# Patient Record
Sex: FEMALE | Race: WHITE | NOT HISPANIC OR LATINO
[De-identification: names, ages, dates, MRNs, and addresses within clinical notes are randomized per-mention and may not be internally consistent; named-entity substitution may affect disease eponyms.]

---

## 2017-01-20 ENCOUNTER — MESSAGE (OUTPATIENT)
Age: 82
End: 2017-01-20

## 2017-02-23 ENCOUNTER — APPOINTMENT (OUTPATIENT)
Dept: PULMONOLOGY | Facility: CLINIC | Age: 82
End: 2017-02-23

## 2017-02-23 VITALS
OXYGEN SATURATION: 97 % | DIASTOLIC BLOOD PRESSURE: 60 MMHG | HEART RATE: 63 BPM | BODY MASS INDEX: 36.95 KG/M2 | SYSTOLIC BLOOD PRESSURE: 118 MMHG | WEIGHT: 243 LBS

## 2017-03-13 ENCOUNTER — EMERGENCY (EMERGENCY)
Facility: HOSPITAL | Age: 82
LOS: 1 days | Discharge: HOME | End: 2017-03-13
Attending: EMERGENCY MEDICINE
Payer: MEDICARE

## 2017-03-13 VITALS
TEMPERATURE: 98 F | HEIGHT: 67 IN | SYSTOLIC BLOOD PRESSURE: 119 MMHG | WEIGHT: 240.08 LBS | HEART RATE: 60 BPM | RESPIRATION RATE: 18 BRPM | OXYGEN SATURATION: 99 % | DIASTOLIC BLOOD PRESSURE: 78 MMHG

## 2017-03-13 VITALS
RESPIRATION RATE: 18 BRPM | HEART RATE: 63 BPM | SYSTOLIC BLOOD PRESSURE: 126 MMHG | OXYGEN SATURATION: 96 % | DIASTOLIC BLOOD PRESSURE: 68 MMHG

## 2017-03-13 DIAGNOSIS — Z90.710 ACQUIRED ABSENCE OF BOTH CERVIX AND UTERUS: Chronic | ICD-10-CM

## 2017-03-13 DIAGNOSIS — Z79.01 LONG TERM (CURRENT) USE OF ANTICOAGULANTS: ICD-10-CM

## 2017-03-13 DIAGNOSIS — G56.00 CARPAL TUNNEL SYNDROME, UNSPECIFIED UPPER LIMB: Chronic | ICD-10-CM

## 2017-03-13 DIAGNOSIS — R55 SYNCOPE AND COLLAPSE: ICD-10-CM

## 2017-03-13 DIAGNOSIS — Z98.89 OTHER SPECIFIED POSTPROCEDURAL STATES: Chronic | ICD-10-CM

## 2017-03-13 DIAGNOSIS — Z95.0 PRESENCE OF CARDIAC PACEMAKER: ICD-10-CM

## 2017-03-13 DIAGNOSIS — Z90.49 ACQUIRED ABSENCE OF OTHER SPECIFIED PARTS OF DIGESTIVE TRACT: Chronic | ICD-10-CM

## 2017-03-13 DIAGNOSIS — Z96.651 PRESENCE OF RIGHT ARTIFICIAL KNEE JOINT: Chronic | ICD-10-CM

## 2017-03-13 DIAGNOSIS — K21.9 GASTRO-ESOPHAGEAL REFLUX DISEASE WITHOUT ESOPHAGITIS: ICD-10-CM

## 2017-03-13 DIAGNOSIS — Z98.42 CATARACT EXTRACTION STATUS, LEFT EYE: Chronic | ICD-10-CM

## 2017-03-13 DIAGNOSIS — I11.0 HYPERTENSIVE HEART DISEASE WITH HEART FAILURE: ICD-10-CM

## 2017-03-13 DIAGNOSIS — Z86.718 PERSONAL HISTORY OF OTHER VENOUS THROMBOSIS AND EMBOLISM: ICD-10-CM

## 2017-03-13 DIAGNOSIS — D12.6 BENIGN NEOPLASM OF COLON, UNSPECIFIED: Chronic | ICD-10-CM

## 2017-03-13 DIAGNOSIS — I48.91 UNSPECIFIED ATRIAL FIBRILLATION: ICD-10-CM

## 2017-03-13 DIAGNOSIS — H02.409 UNSPECIFIED PTOSIS OF UNSPECIFIED EYELID: Chronic | ICD-10-CM

## 2017-03-13 DIAGNOSIS — Z98.41 CATARACT EXTRACTION STATUS, RIGHT EYE: Chronic | ICD-10-CM

## 2017-03-13 DIAGNOSIS — I50.9 HEART FAILURE, UNSPECIFIED: ICD-10-CM

## 2017-03-13 DIAGNOSIS — Z90.710 ACQUIRED ABSENCE OF BOTH CERVIX AND UTERUS: ICD-10-CM

## 2017-03-13 DIAGNOSIS — J44.0 CHRONIC OBSTRUCTIVE PULMONARY DISEASE WITH ACUTE LOWER RESPIRATORY INFECTION: ICD-10-CM

## 2017-03-13 DIAGNOSIS — M75.121 COMPLETE ROTATOR CUFF TEAR OR RUPTURE OF RIGHT SHOULDER, NOT SPECIFIED AS TRAUMATIC: Chronic | ICD-10-CM

## 2017-03-13 DIAGNOSIS — Z98.42 CATARACT EXTRACTION STATUS, LEFT EYE: ICD-10-CM

## 2017-03-13 DIAGNOSIS — Z98.41 CATARACT EXTRACTION STATUS, RIGHT EYE: ICD-10-CM

## 2017-03-13 LAB
APTT BLD: 42.6 SEC — HIGH (ref 27.5–37.4)
HCT VFR BLD CALC: 45.2 % — SIGNIFICANT CHANGE UP (ref 37–47)
HGB BLD-MCNC: 14.5 G/DL — SIGNIFICANT CHANGE UP (ref 12–16)
INR BLD: 2.77 RATIO — HIGH (ref 0.88–1.16)
LIDOCAIN IGE QN: 30 U/L — SIGNIFICANT CHANGE UP (ref 22–51)
MCHC RBC-ENTMCNC: 32.1 G/DL — SIGNIFICANT CHANGE UP (ref 32–36)
MCHC RBC-ENTMCNC: 34.2 PG — HIGH (ref 27–31)
MCV RBC AUTO: 106.6 FL — HIGH (ref 81–99)
NT-PROBNP SERPL-SCNC: 1083 PG/ML — HIGH (ref 0–300)
PLATELET # BLD AUTO: 174 K/UL — SIGNIFICANT CHANGE UP (ref 150–400)
PROTHROM AB SERPL-ACNC: 30.8 SEC — HIGH (ref 10–13.1)
RAPID RVP RESULT: SIGNIFICANT CHANGE UP
RBC # BLD: 4.24 M/UL — LOW (ref 4.4–5.2)
RBC # FLD: 15.2 % — SIGNIFICANT CHANGE UP (ref 11–15.6)
TROPONIN T SERPL-MCNC: <0.01 NG/ML — SIGNIFICANT CHANGE UP (ref 0–0.06)
WBC # BLD: 4.4 K/UL — LOW (ref 4.8–10.8)
WBC # FLD AUTO: 4.4 K/UL — LOW (ref 4.8–10.8)

## 2017-03-13 PROCEDURE — 71020: CPT | Mod: 26

## 2017-03-13 PROCEDURE — 99285 EMERGENCY DEPT VISIT HI MDM: CPT

## 2017-03-13 PROCEDURE — 70450 CT HEAD/BRAIN W/O DYE: CPT | Mod: 26

## 2017-03-13 PROCEDURE — 93010 ELECTROCARDIOGRAM REPORT: CPT

## 2017-03-13 PROCEDURE — 71275 CT ANGIOGRAPHY CHEST: CPT | Mod: 26

## 2017-03-13 RX ORDER — DEXAMETHASONE 0.5 MG/5ML
10 ELIXIR ORAL ONCE
Qty: 0 | Refills: 0 | Status: COMPLETED | OUTPATIENT
Start: 2017-03-13 | End: 2017-03-13

## 2017-03-13 RX ORDER — SODIUM CHLORIDE 9 MG/ML
3 INJECTION INTRAMUSCULAR; INTRAVENOUS; SUBCUTANEOUS ONCE
Qty: 0 | Refills: 0 | Status: COMPLETED | OUTPATIENT
Start: 2017-03-13 | End: 2017-03-13

## 2017-03-13 RX ORDER — AZITHROMYCIN 500 MG/1
500 TABLET, FILM COATED ORAL ONCE
Qty: 0 | Refills: 0 | Status: COMPLETED | OUTPATIENT
Start: 2017-03-13 | End: 2017-03-13

## 2017-03-13 RX ORDER — SODIUM CHLORIDE 9 MG/ML
1000 INJECTION INTRAMUSCULAR; INTRAVENOUS; SUBCUTANEOUS ONCE
Qty: 0 | Refills: 0 | Status: COMPLETED | OUTPATIENT
Start: 2017-03-13 | End: 2017-03-13

## 2017-03-13 RX ORDER — IPRATROPIUM/ALBUTEROL SULFATE 18-103MCG
3 AEROSOL WITH ADAPTER (GRAM) INHALATION ONCE
Qty: 0 | Refills: 0 | Status: COMPLETED | OUTPATIENT
Start: 2017-03-13 | End: 2017-03-13

## 2017-03-13 RX ORDER — AZITHROMYCIN 500 MG/1
1 TABLET, FILM COATED ORAL
Qty: 4 | Refills: 0 | OUTPATIENT
Start: 2017-03-13 | End: 2017-03-17

## 2017-03-13 RX ADMIN — SODIUM CHLORIDE 1000 MILLILITER(S): 9 INJECTION INTRAMUSCULAR; INTRAVENOUS; SUBCUTANEOUS at 14:53

## 2017-03-13 RX ADMIN — SODIUM CHLORIDE 3 MILLILITER(S): 9 INJECTION INTRAMUSCULAR; INTRAVENOUS; SUBCUTANEOUS at 12:05

## 2017-03-13 RX ADMIN — Medication 10 MILLIGRAM(S): at 17:19

## 2017-03-13 RX ADMIN — Medication 100 MILLIGRAM(S): at 17:19

## 2017-03-13 RX ADMIN — AZITHROMYCIN 500 MILLIGRAM(S): 500 TABLET, FILM COATED ORAL at 17:18

## 2017-03-13 RX ADMIN — Medication 3 MILLILITER(S): at 14:50

## 2017-03-13 NOTE — ED PROVIDER NOTE - PHYSICAL EXAMINATION
Constitutional: Alert, NAD.   ENT: Airway patent. Nose clear. Mouth with normal mucosa.   Head: Atraumatic.   Eyes: Clear bilaterally. PERRL.   Cardiac: Normal rate.   Respiratory: + rhonchi on the left  GI: Abdomen soft, non-tender, no guarding.   : No CVA or bladder tenderness.   Musculoskeletal: FROM, no muscle or joint tenderness or swelling.   Neuro: alert and oriented, no focal deficits, no motor or sensory deficits.   Skin: Dry, intact, no rash.   Psych: normal mood and affect.

## 2017-03-13 NOTE — ED PROVIDER NOTE - CARE PLAN
Principal Discharge DX:	Acute bronchitis  Secondary Diagnosis:	COPD (chronic obstructive pulmonary disease)  Secondary Diagnosis:	Near syncope

## 2017-03-13 NOTE — ED ADULT NURSE NOTE - PMH
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

## 2017-03-13 NOTE — ED PROVIDER NOTE - MEDICAL DECISION MAKING DETAILS
Patient is feeling better and wants to go home.  Will d/c with abx, steriods, tessalon perles, and oral rehydration instructions

## 2017-03-13 NOTE — ED ADULT TRIAGE NOTE - CHIEF COMPLAINT QUOTE
pt sent in from md office for eval of worsening dizziness and felt faint, pt was at md office for routine INR , recent hx of aspiration from unknown cause

## 2017-03-13 NOTE — ED PROVIDER NOTE - NS ED ROS FT
no fever  no chest pain  + SOB, + cough  no abd pain  no HA  + near syncope  All other ROS negative except as per HPI

## 2017-03-13 NOTE — ED ADULT NURSE NOTE - PSH
Acquired ptosis of eyelid  2014  Acute carpal tunnel syndrome  with surgery  Complete tear of rotator cuff, right  1998  H/O cardiac catheterization  2005 & 2007  H/O laminectomy  3/4 lumbar    2002  H/O total hysterectomy  1971  Rectal adenoma  s/p excision 2005  S/P cataract surgery, left  June 2010  S/P cataract surgery, right  May 2010  S/P cholecystectomy  1991  S/P IVC filter  placed 1/9  S/P knee replacement, right  2007

## 2017-03-13 NOTE — ED ADULT NURSE NOTE - OBJECTIVE STATEMENT
received pt AOx3, c/o dizziness and feeling faint since Monday. went to Dr. munson office today for blood work. Pt on coumadin. states her INR was 2.6, denies falling, states she walks with walker. Dr. Rolle recommended her come to ED for further evaluation. respirations even unlabored, MAEx4, neuro intact. will continue to monitor.

## 2017-03-13 NOTE — ED PROVIDER NOTE - OBJECTIVE STATEMENT
CC: cough and lightheadedness  Presenting symptoms: 84yo female c/o lightheadedness and near syncope and worse with coughing.  Patient with long coughing attacks where she becomes SOB and her O2 sat goes down into 60s and 70s.  Patient recently treated for aspiration pneumonia with steriods and abx 2 weeks ago, finished 1 week ago and symptoms worsened 2 days ago.  PAtient states only gets lightheaded with  standing and resolves when she lays down.  Pertinent Positives: cough, near syncope  Pertinent Negatives: no CP, no HA, no visual disturbance, no abd pain, no N/V/D, no rash  Timin days ago  Quality: nonproductive cough  Radiation: none  Severity:moderate  Aggravating Factors: coughing, standing  Relieving Factors: lying down

## 2017-03-23 ENCOUNTER — APPOINTMENT (OUTPATIENT)
Dept: PULMONOLOGY | Facility: CLINIC | Age: 82
End: 2017-03-23

## 2017-03-23 VITALS
DIASTOLIC BLOOD PRESSURE: 70 MMHG | RESPIRATION RATE: 14 BRPM | HEART RATE: 66 BPM | SYSTOLIC BLOOD PRESSURE: 108 MMHG | OXYGEN SATURATION: 98 %

## 2017-03-23 VITALS — WEIGHT: 235 LBS | BODY MASS INDEX: 35.73 KG/M2

## 2017-03-23 DIAGNOSIS — Z87.09 PERSONAL HISTORY OF OTHER DISEASES OF THE RESPIRATORY SYSTEM: ICD-10-CM

## 2017-03-23 RX ORDER — CHOLECALCIFEROL (VITAMIN D3) 50 MCG
50 MCG TABLET ORAL
Refills: 0 | Status: ACTIVE | COMMUNITY
Start: 2017-03-23

## 2017-04-13 PROCEDURE — 85730 THROMBOPLASTIN TIME PARTIAL: CPT

## 2017-04-13 PROCEDURE — 87486 CHLMYD PNEUM DNA AMP PROBE: CPT

## 2017-04-13 PROCEDURE — 93005 ELECTROCARDIOGRAM TRACING: CPT

## 2017-04-13 PROCEDURE — 94640 AIRWAY INHALATION TREATMENT: CPT

## 2017-04-13 PROCEDURE — 83880 ASSAY OF NATRIURETIC PEPTIDE: CPT

## 2017-04-13 PROCEDURE — 71046 X-RAY EXAM CHEST 2 VIEWS: CPT

## 2017-04-13 PROCEDURE — 87581 M.PNEUMON DNA AMP PROBE: CPT

## 2017-04-13 PROCEDURE — 70450 CT HEAD/BRAIN W/O DYE: CPT

## 2017-04-13 PROCEDURE — 87633 RESP VIRUS 12-25 TARGETS: CPT

## 2017-04-13 PROCEDURE — 85610 PROTHROMBIN TIME: CPT

## 2017-04-13 PROCEDURE — 85027 COMPLETE CBC AUTOMATED: CPT

## 2017-04-13 PROCEDURE — 99284 EMERGENCY DEPT VISIT MOD MDM: CPT | Mod: 25

## 2017-04-13 PROCEDURE — 96372 THER/PROPH/DIAG INJ SC/IM: CPT

## 2017-04-13 PROCEDURE — 87798 DETECT AGENT NOS DNA AMP: CPT

## 2017-04-13 PROCEDURE — 80053 COMPREHEN METABOLIC PANEL: CPT

## 2017-04-13 PROCEDURE — 82550 ASSAY OF CK (CPK): CPT

## 2017-04-13 PROCEDURE — 71275 CT ANGIOGRAPHY CHEST: CPT

## 2017-04-13 PROCEDURE — 83690 ASSAY OF LIPASE: CPT

## 2017-04-13 PROCEDURE — 84484 ASSAY OF TROPONIN QUANT: CPT

## 2017-04-20 ENCOUNTER — APPOINTMENT (OUTPATIENT)
Dept: PULMONOLOGY | Facility: CLINIC | Age: 82
End: 2017-04-20

## 2017-04-20 VITALS
DIASTOLIC BLOOD PRESSURE: 60 MMHG | SYSTOLIC BLOOD PRESSURE: 100 MMHG | OXYGEN SATURATION: 98 % | HEART RATE: 68 BPM | RESPIRATION RATE: 18 BRPM

## 2017-04-20 RX ORDER — CEFUROXIME AXETIL 500 MG/1
500 TABLET ORAL
Qty: 10 | Refills: 3 | Status: DISCONTINUED | COMMUNITY
Start: 2017-02-23 | End: 2017-04-20

## 2017-04-20 RX ORDER — PREDNISONE 10 MG/1
10 TABLET ORAL
Qty: 30 | Refills: 6 | Status: DISCONTINUED | COMMUNITY
Start: 2017-02-23 | End: 2017-04-20

## 2017-05-19 ENCOUNTER — APPOINTMENT (OUTPATIENT)
Dept: PULMONOLOGY | Facility: CLINIC | Age: 82
End: 2017-05-19

## 2017-05-19 VITALS
HEART RATE: 61 BPM | HEIGHT: 68 IN | BODY MASS INDEX: 35.61 KG/M2 | RESPIRATION RATE: 18 BRPM | SYSTOLIC BLOOD PRESSURE: 118 MMHG | TEMPERATURE: 98.2 F | WEIGHT: 235 LBS | OXYGEN SATURATION: 98 % | DIASTOLIC BLOOD PRESSURE: 78 MMHG

## 2017-05-19 DIAGNOSIS — Z87.891 PERSONAL HISTORY OF NICOTINE DEPENDENCE: ICD-10-CM

## 2017-06-02 ENCOUNTER — APPOINTMENT (OUTPATIENT)
Dept: PULMONOLOGY | Facility: CLINIC | Age: 82
End: 2017-06-02

## 2017-06-02 VITALS
TEMPERATURE: 98.4 F | SYSTOLIC BLOOD PRESSURE: 138 MMHG | HEIGHT: 68 IN | DIASTOLIC BLOOD PRESSURE: 80 MMHG | BODY MASS INDEX: 39.4 KG/M2 | OXYGEN SATURATION: 95 % | HEART RATE: 61 BPM | WEIGHT: 260 LBS | RESPIRATION RATE: 16 BRPM

## 2017-06-02 DIAGNOSIS — D64.9 ANEMIA, UNSPECIFIED: ICD-10-CM

## 2017-06-02 DIAGNOSIS — K21.9 GASTRO-ESOPHAGEAL REFLUX DISEASE W/OUT ESOPHAGITIS: ICD-10-CM

## 2017-06-02 DIAGNOSIS — Z87.891 PERSONAL HISTORY OF NICOTINE DEPENDENCE: ICD-10-CM

## 2017-06-02 RX ORDER — AZITHROMYCIN 250 MG/1
250 TABLET, FILM COATED ORAL
Qty: 4 | Refills: 0 | Status: DISCONTINUED | COMMUNITY
Start: 2017-03-13

## 2017-06-02 RX ORDER — OMEPRAZOLE 20 MG/1
20 CAPSULE, DELAYED RELEASE ORAL
Qty: 90 | Refills: 0 | Status: ACTIVE | COMMUNITY
Start: 2016-09-28

## 2017-06-02 RX ORDER — SILVER SULFADIAZINE 10 MG/G
1 CREAM TOPICAL
Qty: 50 | Refills: 0 | Status: DISCONTINUED | COMMUNITY
Start: 2017-01-26

## 2017-07-27 ENCOUNTER — APPOINTMENT (OUTPATIENT)
Dept: PULMONOLOGY | Facility: CLINIC | Age: 82
End: 2017-07-27
Payer: MEDICARE

## 2017-07-27 VITALS
OXYGEN SATURATION: 96 % | WEIGHT: 257 LBS | DIASTOLIC BLOOD PRESSURE: 62 MMHG | SYSTOLIC BLOOD PRESSURE: 108 MMHG | HEART RATE: 61 BPM | BODY MASS INDEX: 39.08 KG/M2

## 2017-07-27 PROCEDURE — 99215 OFFICE O/P EST HI 40 MIN: CPT

## 2017-07-27 RX ORDER — AZELASTINE HYDROCHLORIDE AND FLUTICASONE PROPIONATE 137; 50 UG/1; UG/1
137-50 SPRAY, METERED NASAL
Qty: 1 | Refills: 3 | Status: DISCONTINUED | COMMUNITY
Start: 2017-04-20 | End: 2017-07-27

## 2017-07-27 RX ORDER — PREDNISONE 20 MG/1
20 TABLET ORAL
Qty: 10 | Refills: 0 | Status: DISCONTINUED | COMMUNITY
Start: 2017-03-13 | End: 2017-07-27

## 2017-07-27 RX ORDER — POTASSIUM CHLORIDE 1500 MG/1
20 TABLET, EXTENDED RELEASE ORAL
Qty: 10 | Refills: 0 | Status: DISCONTINUED | COMMUNITY
Start: 2016-12-30 | End: 2017-07-27

## 2017-07-27 RX ORDER — BACITRACIN 500 [USP'U]/G
500 OINTMENT OPHTHALMIC
Qty: 3 | Refills: 0 | Status: DISCONTINUED | COMMUNITY
Start: 2016-12-01 | End: 2017-07-27

## 2017-07-27 RX ORDER — MUPIROCIN 20 MG/G
2 OINTMENT TOPICAL
Qty: 22 | Refills: 0 | Status: DISCONTINUED | COMMUNITY
Start: 2016-12-30 | End: 2017-07-27

## 2017-07-27 RX ORDER — CEFUROXIME AXETIL 500 MG/1
500 TABLET ORAL
Qty: 14 | Refills: 3 | Status: DISCONTINUED | COMMUNITY
Start: 2017-04-20 | End: 2017-07-27

## 2017-10-09 ENCOUNTER — EMERGENCY (EMERGENCY)
Facility: HOSPITAL | Age: 82
LOS: 1 days | Discharge: HOME | End: 2017-10-09
Attending: STUDENT IN AN ORGANIZED HEALTH CARE EDUCATION/TRAINING PROGRAM | Admitting: STUDENT IN AN ORGANIZED HEALTH CARE EDUCATION/TRAINING PROGRAM
Payer: MEDICARE

## 2017-10-09 VITALS
WEIGHT: 240.08 LBS | TEMPERATURE: 98 F | DIASTOLIC BLOOD PRESSURE: 80 MMHG | SYSTOLIC BLOOD PRESSURE: 137 MMHG | HEART RATE: 64 BPM | OXYGEN SATURATION: 95 % | RESPIRATION RATE: 18 BRPM | HEIGHT: 68 IN

## 2017-10-09 VITALS
HEART RATE: 84 BPM | SYSTOLIC BLOOD PRESSURE: 145 MMHG | OXYGEN SATURATION: 97 % | TEMPERATURE: 99 F | RESPIRATION RATE: 16 BRPM | DIASTOLIC BLOOD PRESSURE: 87 MMHG

## 2017-10-09 DIAGNOSIS — G56.00 CARPAL TUNNEL SYNDROME, UNSPECIFIED UPPER LIMB: Chronic | ICD-10-CM

## 2017-10-09 DIAGNOSIS — D12.6 BENIGN NEOPLASM OF COLON, UNSPECIFIED: Chronic | ICD-10-CM

## 2017-10-09 DIAGNOSIS — Z98.89 OTHER SPECIFIED POSTPROCEDURAL STATES: Chronic | ICD-10-CM

## 2017-10-09 DIAGNOSIS — Z98.42 CATARACT EXTRACTION STATUS, LEFT EYE: Chronic | ICD-10-CM

## 2017-10-09 DIAGNOSIS — M75.121 COMPLETE ROTATOR CUFF TEAR OR RUPTURE OF RIGHT SHOULDER, NOT SPECIFIED AS TRAUMATIC: Chronic | ICD-10-CM

## 2017-10-09 DIAGNOSIS — Z90.710 ACQUIRED ABSENCE OF BOTH CERVIX AND UTERUS: Chronic | ICD-10-CM

## 2017-10-09 DIAGNOSIS — Z90.49 ACQUIRED ABSENCE OF OTHER SPECIFIED PARTS OF DIGESTIVE TRACT: Chronic | ICD-10-CM

## 2017-10-09 DIAGNOSIS — Z98.41 CATARACT EXTRACTION STATUS, RIGHT EYE: Chronic | ICD-10-CM

## 2017-10-09 DIAGNOSIS — Z96.651 PRESENCE OF RIGHT ARTIFICIAL KNEE JOINT: Chronic | ICD-10-CM

## 2017-10-09 DIAGNOSIS — H02.409 UNSPECIFIED PTOSIS OF UNSPECIFIED EYELID: Chronic | ICD-10-CM

## 2017-10-09 LAB
APTT BLD: 39.3 SEC — HIGH (ref 27.5–37.4)
INR BLD: 1.6 RATIO — HIGH (ref 0.88–1.16)
PROTHROM AB SERPL-ACNC: 17.8 SEC — HIGH (ref 9.8–12.7)

## 2017-10-09 PROCEDURE — 36415 COLL VENOUS BLD VENIPUNCTURE: CPT

## 2017-10-09 PROCEDURE — 99284 EMERGENCY DEPT VISIT MOD MDM: CPT

## 2017-10-09 PROCEDURE — 85730 THROMBOPLASTIN TIME PARTIAL: CPT

## 2017-10-09 PROCEDURE — 85610 PROTHROMBIN TIME: CPT

## 2017-10-09 PROCEDURE — 99283 EMERGENCY DEPT VISIT LOW MDM: CPT

## 2017-10-09 RX ORDER — ACETAMINOPHEN 500 MG
650 TABLET ORAL ONCE
Qty: 0 | Refills: 0 | Status: COMPLETED | OUTPATIENT
Start: 2017-10-09 | End: 2017-10-09

## 2017-10-09 RX ADMIN — Medication 650 MILLIGRAM(S): at 20:40

## 2017-10-09 NOTE — ED PROVIDER NOTE - OBJECTIVE STATEMENT
An 83 year old female pt presents to the ED c/o wound to her RLE s/p fall. The pt was stepping down two steps when she slipped and fell, hitting her right leg and suffering a laceration to the site. The pt did not lose consciousness or hit her head, and was able to get up on her own afterwards. She has been feeling herself over the past few days. The pt is on Coumadin and is due for an INR check. No further complaints at this time.

## 2017-10-09 NOTE — ED PROVIDER NOTE - MEDICAL DECISION MAKING DETAILS
An 83 year old female pt presents to the ED c/o wound to her RLE s/p fall. Will check INR, repair avulsion with steri-strips.

## 2017-10-09 NOTE — ED ADULT NURSE NOTE - CHPI ED SYMPTOMS NEG
no bleeding/no fever/no numbness/no confusion/no deformity/no vomiting/no weakness/no tingling/no abrasion

## 2017-10-09 NOTE — ED PROVIDER NOTE - CARE PLAN
Principal Discharge DX:	Skin tear of right lower leg without complication  Secondary Diagnosis:	Fall, initial encounter

## 2017-10-09 NOTE — ED PROVIDER NOTE - SKIN, MLM
Irregular laceration over the right anterior shin down to the dermis that splits into two parallel lacerations approx. 8 cm and 6 cm long.

## 2017-10-09 NOTE — ED ADULT NURSE NOTE - OBJECTIVE STATEMENT
witnessed fall, tripped and went down of rt leg. - head trauma. - loc. on coumadin. lac to rt leg. no other visible signs of trauma noted. a and o x3. burks. perrl. breathing even and unlabored. pt reports pain to area of injury, no other complaints at this time. will continue to monitor.

## 2017-10-09 NOTE — ED ADULT TRIAGE NOTE - CHIEF COMPLAINT QUOTE
pt BIBA s.p trip and fall, pt with lac to right lower leg, takes coumadin, no head injruy, no other complaints. AOX3.

## 2017-12-01 ENCOUNTER — APPOINTMENT (OUTPATIENT)
Dept: PULMONOLOGY | Facility: CLINIC | Age: 82
End: 2017-12-01
Payer: MEDICARE

## 2017-12-01 VITALS
OXYGEN SATURATION: 98 % | DIASTOLIC BLOOD PRESSURE: 60 MMHG | HEART RATE: 74 BPM | SYSTOLIC BLOOD PRESSURE: 130 MMHG | BODY MASS INDEX: 37.25 KG/M2 | HEIGHT: 68 IN

## 2017-12-01 VITALS — WEIGHT: 245 LBS | BODY MASS INDEX: 37.25 KG/M2

## 2017-12-01 PROCEDURE — 99215 OFFICE O/P EST HI 40 MIN: CPT | Mod: 25

## 2017-12-01 PROCEDURE — 94010 BREATHING CAPACITY TEST: CPT

## 2018-01-08 ENCOUNTER — EMERGENCY (EMERGENCY)
Facility: HOSPITAL | Age: 83
LOS: 1 days | Discharge: HOME | End: 2018-01-08
Attending: EMERGENCY MEDICINE
Payer: MEDICARE

## 2018-01-08 VITALS
DIASTOLIC BLOOD PRESSURE: 77 MMHG | HEIGHT: 68 IN | WEIGHT: 251.99 LBS | OXYGEN SATURATION: 96 % | TEMPERATURE: 98 F | HEART RATE: 62 BPM | SYSTOLIC BLOOD PRESSURE: 147 MMHG | RESPIRATION RATE: 18 BRPM

## 2018-01-08 DIAGNOSIS — Z98.89 OTHER SPECIFIED POSTPROCEDURAL STATES: Chronic | ICD-10-CM

## 2018-01-08 DIAGNOSIS — M75.121 COMPLETE ROTATOR CUFF TEAR OR RUPTURE OF RIGHT SHOULDER, NOT SPECIFIED AS TRAUMATIC: Chronic | ICD-10-CM

## 2018-01-08 DIAGNOSIS — Z98.42 CATARACT EXTRACTION STATUS, LEFT EYE: Chronic | ICD-10-CM

## 2018-01-08 DIAGNOSIS — Z90.710 ACQUIRED ABSENCE OF BOTH CERVIX AND UTERUS: Chronic | ICD-10-CM

## 2018-01-08 DIAGNOSIS — G56.00 CARPAL TUNNEL SYNDROME, UNSPECIFIED UPPER LIMB: Chronic | ICD-10-CM

## 2018-01-08 DIAGNOSIS — H02.409 UNSPECIFIED PTOSIS OF UNSPECIFIED EYELID: Chronic | ICD-10-CM

## 2018-01-08 DIAGNOSIS — Z98.41 CATARACT EXTRACTION STATUS, RIGHT EYE: Chronic | ICD-10-CM

## 2018-01-08 DIAGNOSIS — D12.6 BENIGN NEOPLASM OF COLON, UNSPECIFIED: Chronic | ICD-10-CM

## 2018-01-08 DIAGNOSIS — Z90.49 ACQUIRED ABSENCE OF OTHER SPECIFIED PARTS OF DIGESTIVE TRACT: Chronic | ICD-10-CM

## 2018-01-08 DIAGNOSIS — Z96.651 PRESENCE OF RIGHT ARTIFICIAL KNEE JOINT: Chronic | ICD-10-CM

## 2018-01-08 LAB
APTT BLD: 42.4 SEC — HIGH (ref 27.5–37.4)
INR BLD: 1.93 RATIO — HIGH (ref 0.88–1.16)
PROTHROM AB SERPL-ACNC: 21.5 SEC — HIGH (ref 9.8–12.7)

## 2018-01-08 PROCEDURE — 99284 EMERGENCY DEPT VISIT MOD MDM: CPT | Mod: 25

## 2018-01-08 PROCEDURE — 73060 X-RAY EXAM OF HUMERUS: CPT

## 2018-01-08 PROCEDURE — 99284 EMERGENCY DEPT VISIT MOD MDM: CPT

## 2018-01-08 PROCEDURE — 73030 X-RAY EXAM OF SHOULDER: CPT

## 2018-01-08 PROCEDURE — 85610 PROTHROMBIN TIME: CPT

## 2018-01-08 PROCEDURE — 73060 X-RAY EXAM OF HUMERUS: CPT | Mod: 26,LT

## 2018-01-08 PROCEDURE — 36415 COLL VENOUS BLD VENIPUNCTURE: CPT

## 2018-01-08 PROCEDURE — 85730 THROMBOPLASTIN TIME PARTIAL: CPT

## 2018-01-08 PROCEDURE — 71101 X-RAY EXAM UNILAT RIBS/CHEST: CPT

## 2018-01-08 PROCEDURE — 71101 X-RAY EXAM UNILAT RIBS/CHEST: CPT | Mod: 26

## 2018-01-08 PROCEDURE — 73030 X-RAY EXAM OF SHOULDER: CPT | Mod: 26,LT

## 2018-01-08 NOTE — ED STATDOCS - SKIN, MLM
no bruising or marks on left breast, swelling of left shoulder, no ecchymosis on left rib cage region no bruising or marks on left breast, swelling of left shoulder, no ecchymosis on left rib cage region, ecchymoses on left upper arm, abrasion of left shoulder, skin evulsion of left elbow

## 2018-01-08 NOTE — ED ADULT TRIAGE NOTE - CHIEF COMPLAINT QUOTE
fell last night pain in left shoulder and arm and left ribs [facility] blood thinners

## 2018-01-08 NOTE — ED STATDOCS - OBJECTIVE STATEMENT
83 y/o F pt TIA, PE, HTN, GERD,  DVT, COPD, CHF, Afib, cholecystectomy, hysterectomy, rt knee replacement  presents to the ED with c/o pain in left shoulder, LUE and left side of rib cage s/p falling last night. Pt states that she was walking around her bed and fell down on her knees and hit a dresser with her left shoulder and then landed on her oxygen machine. Pt went ot Proheath and doctor said he heard crackling in her lungs so he sent her to the ED. She states pain worsens with deep breath and cough. Pt takes Coumadin. Denies LOC, hitting head, NVD. No further complaints at this time.

## 2018-01-25 ENCOUNTER — MEDICATION RENEWAL (OUTPATIENT)
Age: 83
End: 2018-01-25

## 2018-03-19 ENCOUNTER — APPOINTMENT (OUTPATIENT)
Dept: UROGYNECOLOGY | Facility: CLINIC | Age: 83
End: 2018-03-19
Payer: MEDICARE

## 2018-03-19 PROCEDURE — 51701 INSERT BLADDER CATHETER: CPT

## 2018-03-19 PROCEDURE — 99214 OFFICE O/P EST MOD 30 MIN: CPT | Mod: 25

## 2018-03-19 RX ORDER — DOXYCYCLINE HYCLATE 100 MG/1
100 CAPSULE ORAL
Qty: 18 | Refills: 0 | Status: DISCONTINUED | COMMUNITY
Start: 2017-10-24 | End: 2018-03-19

## 2018-03-19 RX ORDER — FUROSEMIDE 20 MG/1
20 TABLET ORAL
Qty: 45 | Refills: 0 | Status: DISCONTINUED | COMMUNITY
Start: 2017-11-06 | End: 2018-03-19

## 2018-03-19 RX ORDER — RANITIDINE 300 MG/1
300 TABLET ORAL
Refills: 0 | Status: DISCONTINUED | COMMUNITY
Start: 2017-03-23 | End: 2018-03-19

## 2018-03-19 RX ORDER — ALBUTEROL SULFATE 2.5 MG/3ML
(2.5 MG/3ML) SOLUTION RESPIRATORY (INHALATION)
Qty: 3 | Refills: 3 | Status: DISCONTINUED | COMMUNITY
Start: 2018-01-25 | End: 2018-03-19

## 2018-03-19 RX ORDER — TIOTROPIUM BROMIDE 18 UG/1
CAPSULE ORAL; RESPIRATORY (INHALATION)
Refills: 0 | Status: DISCONTINUED | COMMUNITY
End: 2018-03-19

## 2018-03-20 ENCOUNTER — RESULT REVIEW (OUTPATIENT)
Age: 83
End: 2018-03-20

## 2018-03-20 LAB
APPEARANCE: CLEAR
BACTERIA: NEGATIVE
BILIRUBIN URINE: ABNORMAL
BLOOD URINE: NEGATIVE
CALCIUM OXALATE CRYSTALS: ABNORMAL
COLOR: ABNORMAL
GLUCOSE QUALITATIVE U: NEGATIVE MG/DL
HYALINE CASTS: 0 /LPF
KETONES URINE: ABNORMAL
LEUKOCYTE ESTERASE URINE: NEGATIVE
MICROSCOPIC-UA: NORMAL
NITRITE URINE: NEGATIVE
PH URINE: 5.5
PROTEIN URINE: ABNORMAL MG/DL
RED BLOOD CELLS URINE: 2 /HPF
SPECIFIC GRAVITY URINE: 1.03
SQUAMOUS EPITHELIAL CELLS: 3 /HPF
UROBILINOGEN URINE: 1 MG/DL
WHITE BLOOD CELLS URINE: 2 /HPF

## 2018-03-21 ENCOUNTER — RESULT REVIEW (OUTPATIENT)
Age: 83
End: 2018-03-21

## 2018-03-21 LAB — BACTERIA UR CULT: NORMAL

## 2018-04-04 ENCOUNTER — TRANSCRIPTION ENCOUNTER (OUTPATIENT)
Age: 83
End: 2018-04-04

## 2018-04-04 ENCOUNTER — INPATIENT (INPATIENT)
Facility: HOSPITAL | Age: 83
LOS: 6 days | Discharge: NURSING, REHAB OR LONG-TERM CARE | DRG: 563 | End: 2018-04-11
Attending: SURGERY | Admitting: SURGERY
Payer: MEDICARE

## 2018-04-04 VITALS
SYSTOLIC BLOOD PRESSURE: 160 MMHG | TEMPERATURE: 97 F | HEART RATE: 59 BPM | OXYGEN SATURATION: 97 % | DIASTOLIC BLOOD PRESSURE: 83 MMHG | RESPIRATION RATE: 18 BRPM

## 2018-04-04 DIAGNOSIS — D12.8 BENIGN NEOPLASM OF RECTUM: Chronic | ICD-10-CM

## 2018-04-04 DIAGNOSIS — H25.9 UNSPECIFIED AGE-RELATED CATARACT: Chronic | ICD-10-CM

## 2018-04-04 DIAGNOSIS — Z98.89 OTHER SPECIFIED POSTPROCEDURAL STATES: Chronic | ICD-10-CM

## 2018-04-04 DIAGNOSIS — Z98.890 OTHER SPECIFIED POSTPROCEDURAL STATES: Chronic | ICD-10-CM

## 2018-04-04 DIAGNOSIS — M75.121 COMPLETE ROTATOR CUFF TEAR OR RUPTURE OF RIGHT SHOULDER, NOT SPECIFIED AS TRAUMATIC: Chronic | ICD-10-CM

## 2018-04-04 DIAGNOSIS — D12.6 BENIGN NEOPLASM OF COLON, UNSPECIFIED: Chronic | ICD-10-CM

## 2018-04-04 DIAGNOSIS — G56.00 CARPAL TUNNEL SYNDROME, UNSPECIFIED UPPER LIMB: Chronic | ICD-10-CM

## 2018-04-04 DIAGNOSIS — Z90.710 ACQUIRED ABSENCE OF BOTH CERVIX AND UTERUS: Chronic | ICD-10-CM

## 2018-04-04 DIAGNOSIS — K64.8 OTHER HEMORRHOIDS: Chronic | ICD-10-CM

## 2018-04-04 DIAGNOSIS — Z96.651 PRESENCE OF RIGHT ARTIFICIAL KNEE JOINT: Chronic | ICD-10-CM
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
## 2018-04-04 DIAGNOSIS — H02.403 UNSPECIFIED PTOSIS OF BILATERAL EYELIDS: Chronic | ICD-10-CM

## 2018-04-04 DIAGNOSIS — Z98.41 CATARACT EXTRACTION STATUS, RIGHT EYE: Chronic | ICD-10-CM

## 2018-04-04 DIAGNOSIS — Z95.828 PRESENCE OF OTHER VASCULAR IMPLANTS AND GRAFTS: Chronic | ICD-10-CM

## 2018-04-04 DIAGNOSIS — H02.409 UNSPECIFIED PTOSIS OF UNSPECIFIED EYELID: Chronic | ICD-10-CM

## 2018-04-04 DIAGNOSIS — Z90.49 ACQUIRED ABSENCE OF OTHER SPECIFIED PARTS OF DIGESTIVE TRACT: Chronic | ICD-10-CM

## 2018-04-04 DIAGNOSIS — Z98.42 CATARACT EXTRACTION STATUS, LEFT EYE: Chronic | ICD-10-CM

## 2018-04-04 LAB
ALBUMIN SERPL ELPH-MCNC: 4.1 G/DL — SIGNIFICANT CHANGE UP (ref 3.3–5.2)
ALP SERPL-CCNC: 42 U/L — SIGNIFICANT CHANGE UP (ref 40–120)
ALT FLD-CCNC: 14 U/L — SIGNIFICANT CHANGE UP
ANION GAP SERPL CALC-SCNC: 7 MMOL/L — SIGNIFICANT CHANGE UP (ref 5–17)
APTT BLD: 40.4 SEC — HIGH (ref 27.5–37.4)
AST SERPL-CCNC: 17 U/L — SIGNIFICANT CHANGE UP
BASE EXCESS BLDV CALC-SCNC: 6.5 MMOL/L — HIGH (ref -2–2)
BASOPHILS # BLD AUTO: 0 K/UL — SIGNIFICANT CHANGE UP (ref 0–0.2)
BASOPHILS NFR BLD AUTO: 0.2 % — SIGNIFICANT CHANGE UP (ref 0–2)
BILIRUB SERPL-MCNC: 0.9 MG/DL — SIGNIFICANT CHANGE UP (ref 0.4–2)
BLD GP AB SCN SERPL QL: SIGNIFICANT CHANGE UP
BUN SERPL-MCNC: 22 MG/DL — HIGH (ref 8–20)
CA-I SERPL-SCNC: 1.14 MMOL/L — LOW (ref 1.15–1.33)
CALCIUM SERPL-MCNC: 9.4 MG/DL — SIGNIFICANT CHANGE UP (ref 8.6–10.2)
CHLORIDE BLDV-SCNC: 104 MMOL/L — SIGNIFICANT CHANGE UP (ref 98–107)
CHLORIDE SERPL-SCNC: 102 MMOL/L — SIGNIFICANT CHANGE UP (ref 98–107)
CO2 SERPL-SCNC: 31 MMOL/L — HIGH (ref 22–29)
CREAT SERPL-MCNC: 0.8 MG/DL — SIGNIFICANT CHANGE UP (ref 0.5–1.3)
EOSINOPHIL # BLD AUTO: 0 K/UL — SIGNIFICANT CHANGE UP (ref 0–0.5)
EOSINOPHIL NFR BLD AUTO: 0.9 % — SIGNIFICANT CHANGE UP (ref 0–6)
GAS PNL BLDV: 144 MMOL/L — SIGNIFICANT CHANGE UP (ref 135–145)
GAS PNL BLDV: SIGNIFICANT CHANGE UP
GAS PNL BLDV: SIGNIFICANT CHANGE UP
GLUCOSE BLDV-MCNC: 91 MG/DL — SIGNIFICANT CHANGE UP (ref 70–99)
GLUCOSE SERPL-MCNC: 94 MG/DL — SIGNIFICANT CHANGE UP (ref 70–115)
HCO3 BLDV-SCNC: 30 MMOL/L — HIGH (ref 21–29)
HCT VFR BLD CALC: 40.2 % — SIGNIFICANT CHANGE UP (ref 37–47)
HCT VFR BLDA CALC: 38 — LOW (ref 39–50)
HGB BLD CALC-MCNC: 12.6 G/DL — SIGNIFICANT CHANGE UP (ref 11.5–15.5)
HGB BLD-MCNC: 12.4 G/DL — SIGNIFICANT CHANGE UP (ref 12–16)
INR BLD: 1.96 RATIO — HIGH (ref 0.88–1.16)
LACTATE BLDV-MCNC: 0.9 MMOL/L — SIGNIFICANT CHANGE UP (ref 0.5–2)
LYMPHOCYTES # BLD AUTO: 1.6 K/UL — SIGNIFICANT CHANGE UP (ref 1–4.8)
LYMPHOCYTES # BLD AUTO: 34.9 % — SIGNIFICANT CHANGE UP (ref 20–55)
MCHC RBC-ENTMCNC: 30.8 G/DL — LOW (ref 32–36)
MCHC RBC-ENTMCNC: 32.3 PG — HIGH (ref 27–31)
MCV RBC AUTO: 104.7 FL — HIGH (ref 81–99)
MONOCYTES # BLD AUTO: 0.5 K/UL — SIGNIFICANT CHANGE UP (ref 0–0.8)
MONOCYTES NFR BLD AUTO: 10.2 % — HIGH (ref 3–10)
NEUTROPHILS # BLD AUTO: 2.5 K/UL — SIGNIFICANT CHANGE UP (ref 1.8–8)
NEUTROPHILS NFR BLD AUTO: 53.6 % — SIGNIFICANT CHANGE UP (ref 37–73)
OTHER CELLS CSF MANUAL: 12 ML/DL — LOW (ref 18–22)
PCO2 BLDV: 49 MMHG — SIGNIFICANT CHANGE UP (ref 35–50)
PH BLDV: 7.42 — SIGNIFICANT CHANGE UP (ref 7.32–7.43)
PLATELET # BLD AUTO: 138 K/UL — LOW (ref 150–400)
PO2 BLDV: 38 MMHG — SIGNIFICANT CHANGE UP (ref 25–45)
POTASSIUM BLDV-SCNC: 4.6 MMOL/L — HIGH (ref 3.4–4.5)
POTASSIUM SERPL-MCNC: 4.6 MMOL/L — SIGNIFICANT CHANGE UP (ref 3.5–5.3)
POTASSIUM SERPL-SCNC: 4.6 MMOL/L — SIGNIFICANT CHANGE UP (ref 3.5–5.3)
PROT SERPL-MCNC: 6.7 G/DL — SIGNIFICANT CHANGE UP (ref 6.6–8.7)
PROTHROM AB SERPL-ACNC: 21.9 SEC — HIGH (ref 9.8–12.7)
RBC # BLD: 3.84 M/UL — LOW (ref 4.4–5.2)
RBC # FLD: 17.6 % — HIGH (ref 11–15.6)
SAO2 % BLDV: 72 % — SIGNIFICANT CHANGE UP
SODIUM SERPL-SCNC: 140 MMOL/L — SIGNIFICANT CHANGE UP (ref 135–145)
TYPE + AB SCN PNL BLD: SIGNIFICANT CHANGE UP
WBC # BLD: 4.7 K/UL — LOW (ref 4.8–10.8)
WBC # FLD AUTO: 4.7 K/UL — LOW (ref 4.8–10.8)

## 2018-04-04 PROCEDURE — 73030 X-RAY EXAM OF SHOULDER: CPT | Mod: 26,LT

## 2018-04-04 PROCEDURE — 73110 X-RAY EXAM OF WRIST: CPT | Mod: 26,LT

## 2018-04-04 PROCEDURE — 73090 X-RAY EXAM OF FOREARM: CPT | Mod: 26,76,LT

## 2018-04-04 PROCEDURE — 72125 CT NECK SPINE W/O DYE: CPT | Mod: 26

## 2018-04-04 PROCEDURE — 71045 X-RAY EXAM CHEST 1 VIEW: CPT | Mod: 26

## 2018-04-04 PROCEDURE — 70450 CT HEAD/BRAIN W/O DYE: CPT | Mod: 26

## 2018-04-04 PROCEDURE — 73060 X-RAY EXAM OF HUMERUS: CPT | Mod: 26,LT

## 2018-04-04 PROCEDURE — 73070 X-RAY EXAM OF ELBOW: CPT | Mod: 26,LT

## 2018-04-04 RX ORDER — ONDANSETRON 8 MG/1
4 TABLET, FILM COATED ORAL EVERY 6 HOURS
Qty: 0 | Refills: 0 | Status: DISCONTINUED | OUTPATIENT
Start: 2018-04-04 | End: 2018-04-11

## 2018-04-04 RX ORDER — ACETAMINOPHEN 500 MG
650 TABLET ORAL EVERY 6 HOURS
Qty: 0 | Refills: 0 | Status: DISCONTINUED | OUTPATIENT
Start: 2018-04-04 | End: 2018-04-11

## 2018-04-04 RX ORDER — ACETAMINOPHEN 500 MG
650 TABLET ORAL ONCE
Qty: 0 | Refills: 0 | Status: COMPLETED | OUTPATIENT
Start: 2018-04-04 | End: 2018-04-04

## 2018-04-04 RX ORDER — DOCUSATE SODIUM 100 MG
100 CAPSULE ORAL THREE TIMES A DAY
Qty: 0 | Refills: 0 | Status: DISCONTINUED | OUTPATIENT
Start: 2018-04-04 | End: 2018-04-11

## 2018-04-04 RX ORDER — SODIUM CHLORIDE 9 MG/ML
1000 INJECTION INTRAMUSCULAR; INTRAVENOUS; SUBCUTANEOUS
Qty: 0 | Refills: 0 | Status: DISCONTINUED | OUTPATIENT
Start: 2018-04-04 | End: 2018-04-04

## 2018-04-04 RX ORDER — TETANUS TOXOID, REDUCED DIPHTHERIA TOXOID AND ACELLULAR PERTUSSIS VACCINE, ADSORBED 5; 2.5; 8; 8; 2.5 [IU]/.5ML; [IU]/.5ML; UG/.5ML; UG/.5ML; UG/.5ML
0.5 SUSPENSION INTRAMUSCULAR ONCE
Qty: 0 | Refills: 0 | Status: COMPLETED | OUTPATIENT
Start: 2018-04-04 | End: 2018-04-04

## 2018-04-04 RX ORDER — LIDOCAINE 4 G/100G
2 CREAM TOPICAL EVERY 24 HOURS
Qty: 0 | Refills: 0 | Status: DISCONTINUED | OUTPATIENT
Start: 2018-04-04 | End: 2018-04-11

## 2018-04-04 RX ADMIN — Medication 650 MILLIGRAM(S): at 20:17

## 2018-04-04 NOTE — H&P ADULT - PSH
Age-related cataract of both eyes    H/O abdominal hysterectomy    H/O repair of right rotator cuff    History of cholecystectomy    History of lumbar laminectomy  L3-4  History of total knee arthroplasty, right    Internal hemorrhoid  banded and cauterized  Ptosis, both eyelids    Rectal adenoma Age-related cataract of both eyes    H/O abdominal hysterectomy    H/O atrioventricular eva ablation    H/O repair of right rotator cuff    History of cholecystectomy    History of lumbar laminectomy  L3-4  History of total knee arthroplasty, right    Internal hemorrhoid  banded and cauterized  Presence of inferior vena cava filter    Ptosis, both eyelids    Rectal adenoma

## 2018-04-04 NOTE — DISCHARGE NOTE ADULT - CARE PLAN
Principal Discharge DX:	Ulna fracture  Goal:	Improved function and pain control  Assessment and plan of treatment:	* Do not remove or wet splint.  * Contact office immediately if the splint becomes wet.  * Reduce activities accordingly.   * No weight bearing through the wrist  * Office follow-up in 2 weeks with Dr. Chery  * Strict elevation of the left upper extremity.   * Return to the emergency department if you experience increased pain, swelling, numbness/tingling or any other concerning symptoms. Principal Discharge DX:	Ulna fracture  Goal:	Improved function and pain control  Assessment and plan of treatment:	* Do not remove or wet splint.  * Contact office immediately if the splint becomes wet.  * Reduce activities accordingly.   * No weight bearing through the wrist  * Office follow-up in 2 weeks with Dr. Chery  * Strict elevation of the left upper extremity.   * Return to the emergency department if you experience increased pain, swelling, numbness/tingling or any other concerning symptoms.  Secondary Diagnosis:	Atrial fibrillation  Assessment and plan of treatment:	continue all cardiac medications as previously prescribed and continue to monitor INR daily and dose coumadin accordingly. Principal Discharge DX:	Ulna fracture  Goal:	Improved function and pain control  Assessment and plan of treatment:	* Do not remove or wet splint.  * Contact office immediately if the splint becomes wet.  * Reduce activities accordingly.   * No weight bearing through the wrist  * Office follow-up in 2 weeks with Dr. Chery  * Strict elevation of the left upper extremity.   * Return to the emergency department if you experience increased pain, swelling, numbness/tingling or any other concerning symptoms.  Secondary Diagnosis:	Atrial fibrillation  Assessment and plan of treatment:	Please continue taking coumadin daily at your current home dose. It is EXTREMELY important that you follow-up with your primary care provider and/or your cardiologist IMMEDIATELY after discharge for frequent INR checks and coumadin dosing. If you do not follow-up immediately and your INR becomes supra-therapeutic or sub-therapeutic, you risk bleeding or forming clots, respectively.

## 2018-04-04 NOTE — H&P ADULT - HISTORY OF PRESENT ILLNESS
85 y/o Female with PMHx Afib, PE, DVT on coumadin, s/p IVCF,  HTN, CAD, CHF, PPM, AV node ablation, TIA, COPD, Asthma, GERD, rectal adenoma BIBA s/p fall from standing walking up driveway.  C/O L shoulder and hip pain, no LOC.

## 2018-04-04 NOTE — ED ADULT NURSE REASSESSMENT NOTE - NS ED NURSE REASSESS COMMENT FT1
Report given to accepting JULIET Clay POC discussed with pt awaiting Ortho consult at this time, then transport to unit, pt and family verbalize understanding, NPO status reviewed with pt who verbalized understanding, pt comfortable, offers no complaints at this time, safety measures maintained.

## 2018-04-04 NOTE — CONSULT NOTE ADULT - SUBJECTIVE AND OBJECTIVE BOX
ORTHO-HAND SERVICE    Pt Name: KAYLA HAWAII    MRN: [id number]      Patient is a 84y Female presenting to the emergency department with a chief complaint of left wrist pain s/p mechanical fall x 4 hours ago. Pt states that she was walking at home when she tripped and fell on her left side. Pt admits to hitting the left side of her face but denies LOC. Pt otherwise denies c/p, sob, abdominal pain, n/v, numbness/tingling/weakness and has no other complaints. Pt is right hand dominant and lives at home with 6 family members.      Patient presents for evaluation of       REVIEW OF SYSTEMS    General: Alert, responsive, in NAD    Skin/Breast: No rashes, no pruritis, +skin tear  	  Ophthalmologic: No visual changes. No redness.   	  ENMT:	No discharge. No swelling.    Respiratory and Thorax: No difficulty breathing. No cough.  	   Cardiovascular: No chest pain. No palpitations.    Gastrointestinal: No abdominal pain. No diarrhea.     Genitourinary: No dysuria. No bleeding.    Musculoskeletal: SEE HPI.    Neurological: No sensory or motor changes.     Psychiatric: No anxiety or depression.    Hematology/Lymphatics: No swelling.    Endocrine: No Hx of diabetes.    ROS is otherwise negative.    PAST MEDICAL & SURGICAL HISTORY:  PAST MEDICAL & SURGICAL HISTORY:  Pacemaker  TIA (transient ischemic attack)  DVT (deep venous thrombosis)  Pulmonary embolism  Anemia  GERD (gastroesophageal reflux disease)  CHF (congestive heart failure)  Asthma  COPD (chronic obstructive pulmonary disease)  Atrial fibrillation  Presence of inferior vena cava filter  H/O atrioventricular eva ablation  Age-related cataract of both eyes  Ptosis, both eyelids  Internal hemorrhoid: banded and cauterized  History of total knee arthroplasty, right  Rectal adenoma  History of lumbar laminectomy: L3-4  History of cholecystectomy  H/O repair of right rotator cuff  H/O abdominal hysterectomy      Allergies: codeine (Unknown)  morphine (Unknown)      Medications: acetaminophen   Tablet. 650 milliGRAM(s) Oral every 6 hours  docusate sodium 100 milliGRAM(s) Oral three times a day  lidocaine   Patch 2 Patch Transdermal every 24 hours  ondansetron Injectable 4 milliGRAM(s) IV Push every 6 hours PRN  sodium chloride 0.9%. 1000 milliLiter(s) IV Continuous <Continuous>      FAMILY HISTORY:  No pertinent family history in first degree relatives  : non-contributory    Social History:     Vital Signs Last 24 Hrs  T(C): 36.1 (04-04-18 @ 21:51), Max: 36.3 (04-04-18 @ 19:36)  T(F): 97 (04-04-18 @ 21:51), Max: 97.4 (04-04-18 @ 19:36)  HR: 64 (04-04-18 @ 21:51) (59 - 64)  BP: 152/74 (04-04-18 @ 21:51) (152/74 - 160/83)  BP(mean): --  RR: 18 (04-04-18 @ 21:51) (18 - 18)  SpO2: 97% (04-04-18 @ 21:51) (97% - 97%)  Daily     Daily                             12.4   4.7   )-----------( 138      ( 04 Apr 2018 18:08 )             40.2       04-04    140  |  102  |  22.0<H>  ----------------------------<  94  4.6   |  31.0<H>  |  0.80    Ca    9.4      04 Apr 2018 18:08    TPro  6.7  /  Alb  4.1  /  TBili  0.9  /  DBili  x   /  AST  17  /  ALT  14  /  AlkPhos  42  04-04        PHYSICAL EXAM:      Appearance: Alert, responsive, in no acute distress.    Neurological: Sensation is grossly intact to light touch. 5/5 motor function of all extremities. No focal deficits or weaknesses found.    Skin: no rash on visible skin. + 2cm x 7cm superficial skin tear of the left dorsal forearm.    Vascular: 2+ distal pulses. Cap refill < 2 sec. No extremity ulcerations. No cyanosis.    Musculoskeletal:         Left Upper Extremity: FROM of the shoulder/elbow with no TTP noted, +TTP of the distal ulna with mild swelling/ecchymosis present. Decreased ROM of the left wrist due to reported pain. Compartments soft and compressible. +abduction/adduction/flexion/extension of all digits.       Right Upper Extremity:  + NROM. Non-tender. No signs of trauma.        Left Lower Extremity:  + NROM. Non-tender. No signs of trauma.        Right Lower Extremity:  + NROM. Non-tender. No signs of trauma.            Imaging Studies:     Procedure: SPLINTING   PROCEDURE NOTE: Splinting    Performed by: Lamine Khalil PA-C     Indication: left ulna fracture    The left wrist was appropriately positioned. A plaster well padded splint was applied. Distally, the extremity was neurovascular intact following the procedure. The patient tolerated the procedure well.     A/P:  Pt is a  84y Female with a left distal ulna fracture s/p mechanical fall.    PLAN discussed wtih Dr. Chery:   * Well padded sugar tong splint applied to the LUE in the neutral position  * LUE skin tear cleansed with normal saline and Xeroform dressing applied  * Strict elevation  * Pain control as clinically indicated  * Follow up with Dr. Chery in the office in 3-4 days  * Continue care as per primary team ORTHO-HAND SERVICE    Pt Name: KAYLA HAWAII    MRN: [id number]      Patient is a 84y Female presenting to the emergency department with a chief complaint of left wrist pain s/p mechanical fall x 4 hours ago. Pt states that she was walking at home when she tripped and fell on her left side. Pt admits to hitting the left side of her face but denies LOC. Pt otherwise denies c/p, sob, abdominal pain, n/v, numbness/tingling/weakness and has no other complaints. Pt is right hand dominant and lives at home with 6 family members.      Patient presents for evaluation of       REVIEW OF SYSTEMS    General: Alert, responsive, in NAD    Skin/Breast: No rashes, no pruritis, +skin tear  	  Ophthalmologic: No visual changes. No redness.   	  ENMT:	No discharge. No swelling.    Respiratory and Thorax: No difficulty breathing. No cough.  	   Cardiovascular: No chest pain. No palpitations.    Gastrointestinal: No abdominal pain. No diarrhea.     Genitourinary: No dysuria. No bleeding.    Musculoskeletal: SEE HPI.    Neurological: No sensory or motor changes.     Psychiatric: No anxiety or depression.    Hematology/Lymphatics: No swelling.    Endocrine: No Hx of diabetes.    ROS is otherwise negative.    PAST MEDICAL & SURGICAL HISTORY:  PAST MEDICAL & SURGICAL HISTORY:  Pacemaker  TIA (transient ischemic attack)  DVT (deep venous thrombosis)  Pulmonary embolism  Anemia  GERD (gastroesophageal reflux disease)  CHF (congestive heart failure)  Asthma  COPD (chronic obstructive pulmonary disease)  Atrial fibrillation  Presence of inferior vena cava filter  H/O atrioventricular eva ablation  Age-related cataract of both eyes  Ptosis, both eyelids  Internal hemorrhoid: banded and cauterized  History of total knee arthroplasty, right  Rectal adenoma  History of lumbar laminectomy: L3-4  History of cholecystectomy  H/O repair of right rotator cuff  H/O abdominal hysterectomy      Allergies: codeine (Unknown)  morphine (Unknown)      Medications: acetaminophen   Tablet. 650 milliGRAM(s) Oral every 6 hours  docusate sodium 100 milliGRAM(s) Oral three times a day  lidocaine   Patch 2 Patch Transdermal every 24 hours  ondansetron Injectable 4 milliGRAM(s) IV Push every 6 hours PRN  sodium chloride 0.9%. 1000 milliLiter(s) IV Continuous <Continuous>      FAMILY HISTORY:  No pertinent family history in first degree relatives  : non-contributory    Social History:     Vital Signs Last 24 Hrs  T(C): 36.1 (04-04-18 @ 21:51), Max: 36.3 (04-04-18 @ 19:36)  T(F): 97 (04-04-18 @ 21:51), Max: 97.4 (04-04-18 @ 19:36)  HR: 64 (04-04-18 @ 21:51) (59 - 64)  BP: 152/74 (04-04-18 @ 21:51) (152/74 - 160/83)  BP(mean): --  RR: 18 (04-04-18 @ 21:51) (18 - 18)  SpO2: 97% (04-04-18 @ 21:51) (97% - 97%)  Daily     Daily                             12.4   4.7   )-----------( 138      ( 04 Apr 2018 18:08 )             40.2       04-04    140  |  102  |  22.0<H>  ----------------------------<  94  4.6   |  31.0<H>  |  0.80    Ca    9.4      04 Apr 2018 18:08    TPro  6.7  /  Alb  4.1  /  TBili  0.9  /  DBili  x   /  AST  17  /  ALT  14  /  AlkPhos  42  04-04        PHYSICAL EXAM:      Appearance: Alert, responsive, in no acute distress.    Neurological: Sensation is grossly intact to light touch. 5/5 motor function of all extremities. No focal deficits or weaknesses found.    Skin: no rash on visible skin. + 2cm x 7cm superficial skin tear of the left dorsal forearm.    Vascular: 2+ distal pulses. Cap refill < 2 sec. No extremity ulcerations. No cyanosis.    Musculoskeletal:         Left Upper Extremity: FROM of the shoulder/elbow with no TTP noted, +TTP of the distal ulna with mild swelling/ecchymosis present. Decreased ROM of the left wrist due to reported pain. Compartments soft and compressible. +abduction/adduction/flexion/extension of all digits.       Right Upper Extremity:  + NROM. Non-tender. No signs of trauma.        Left Lower Extremity:  + NROM. Non-tender. No signs of trauma.        Right Lower Extremity:  + NROM. Non-tender. No signs of trauma.            Imaging Studies:     Procedure: SPLINTING   PROCEDURE NOTE: Splinting    Performed by: Lamine Khalil PA-C     Indication: left ulna fracture    The left wrist was appropriately positioned. A plaster well padded splint was applied. Distally, the extremity was neurovascular intact following the procedure. The patient tolerated the procedure well.     A/P:  Pt is a  84y Female with a left distal ulna fracture s/p mechanical fall.    PLAN discussed wtih Dr. Chery:   * Well padded sugar tong splint applied to the LUE in the neutral position  * LUE skin tear cleansed with normal saline and Xeroform dressing applied  * NWB of LUE  * Strict elevation  * Pain control as clinically indicated  * Follow up with Dr. Chery in the office in 3-4 days  * Continue care as per primary team

## 2018-04-04 NOTE — DISCHARGE NOTE ADULT - PLAN OF CARE
Improved function and pain control * Do not remove or wet splint.  * Contact office immediately if the splint becomes wet.  * Reduce activities accordingly.   * No weight bearing through the wrist  * Office follow-up in 2 weeks with Dr. Chery  * Strict elevation of the left upper extremity.   * Return to the emergency department if you experience increased pain, swelling, numbness/tingling or any other concerning symptoms. continue all cardiac medications as previously prescribed and continue to monitor INR daily and dose coumadin accordingly. Please continue taking coumadin daily at your current home dose. It is EXTREMELY important that you follow-up with your primary care provider and/or your cardiologist IMMEDIATELY after discharge for frequent INR checks and coumadin dosing. If you do not follow-up immediately and your INR becomes supra-therapeutic or sub-therapeutic, you risk bleeding or forming clots, respectively.

## 2018-04-04 NOTE — DISCHARGE NOTE ADULT - ADDITIONAL INSTRUCTIONS
ORTHOPEDIC FOLLOW UP RECOMMENDATIONS: Do not remove or wet splint. Contact office immediately if the splint becomes wet. Reduce activities accordingly. Non weight bearing of the left upper extremity. Strict elevation of the left upper extremity. Follow up with Dr. Chery in the office in 3-4 days. Return to the emergency department if you experience increased pain, swelling, numbness/tingling or any other concerning symptoms.

## 2018-04-04 NOTE — DISCHARGE NOTE ADULT - MEDICATION SUMMARY - MEDICATIONS TO TAKE
I will START or STAY ON the medications listed below when I get home from the hospital:    Tylenol  -- 650 milligram(s) by mouth every 6 hours, As Needed  -- Indication: For Fall, initial encounter    warfarin 4 mg oral tablet  -- 1 tab(s) by mouth once a day  -- Indication: For Atrial fibrillation    gabapentin 300 mg oral capsule  -- 1 cap(s) by mouth 3 times a day  -- Indication: For History of lumbar laminectomy    FLUoxetine 20 mg oral capsule  -- 1 cap(s) by mouth once a day  -- Indication: For Depression     atorvastatin  -- 20 milligram(s) by mouth once a day  -- Indication: For Hyperlipidemia    atenolol 25 mg oral tablet  -- 1 tab(s) by mouth once a day  -- Indication: For Htn    Spiriva  -- 1 cap(s) inhaled once a day  -- Indication: For COPD (chronic obstructive pulmonary disease)    lidocaine 5% topical film  -- Apply on skin to affected area once a day tp left shoulder   -- Indication: For Fall    furosemide  -- 40 milligram(s) by mouth once a day  -- Indication: For CHF (congestive heart failure)    Zantac 150  -- 150 milligram(s) by mouth 2 times a day  -- Indication: For GERD (gastroesophageal reflux disease)    ferrous sulfate  -- 325 milligram(s) by mouth once a day  -- Indication: For vitamin supplemetn -    docusate sodium 100 mg oral capsule  -- 1 cap(s) by mouth 3 times a day  -- Indication: For COnstipation     sucralfate 1 g oral tablet  -- 1 tab(s) by mouth 4 times a day (before meals and at bedtime)  -- Indication: For GERD (gastroesophageal reflux disease)    oxybutynin 5 mg/24 hours oral tablet, extended release  -- 1 tab(s) by mouth once a day  -- Indication: For bladder spasms    cyanocobalamin  -- 2500 milligram(s) by mouth once a day  -- Indication: For vitamin supplement     ergocalciferol  -- 2000 international unit(s) by mouth once a day  -- Indication: For vitamin supplement

## 2018-04-04 NOTE — DISCHARGE NOTE ADULT - PATIENT PORTAL LINK FT
You can access the [facility] Patient Portal, offered by [facility], by registering with the following website: [email or web address]

## 2018-04-04 NOTE — H&P ADULT - ATTENDING COMMENTS
85 yo female w/ multiple medical problems BIBEMS as trauma activation B 2/2 mechanical fall.  On coumadin.  Denies LOC.  Only c/o left hip and left shoulder pain.  Airway intact.  Primary survey intact.  Secondary survey shows TTP left shoulder and hip and scattered abrasions.  CXR and PXR negative for traumatic injury.  CT head and C-spine negative for traumatic injury.  Plain films of LUE shows left distal ulnar fracture.  Ortho consult.

## 2018-04-04 NOTE — ED PROVIDER NOTE - OBJECTIVE STATEMENT
83 y/o F pt with hx of A-Fib (on coumadin) presents to ED BIBA with head injury and left upper extremity pain s/p trip and fall on deck 30 minutes PTA. Pt notes she was unable to pick herself off the ground.  Allergies to codeine and morphine  denies LOC.  Denies back pain, neck pain, abdominal pain, nausea, vomiting, visual changes. no further complaints at this time. CODE TRAUMA activated by EMS PTA. 83 y/o F pt with hx of A-Fib (on coumadin) presents to ED BIBA with head injury, left upper extremity pain and multiple abrasions s/p trip and fall on deck 30 minutes PTA. Pt notes she was unable to pick herself off the ground.  Allergies to codeine and morphine  denies LOC.  Denies back pain, neck pain, abdominal pain, nausea, vomiting, visual changes. no further complaints at this time. CODE TRAUMA activated by EMS PTA.

## 2018-04-04 NOTE — DISCHARGE NOTE ADULT - HOSPITAL COURSE
85 y/o Female with PMHx Afib, PE, DVT on coumadin, s/p IVCF,  HTN, CAD, CHF, PPM, AV node ablation, TIA, COPD, Asthma, GERD, rectal adenoma BIBA s/p fall from standing walking up driveway.  C/O L shoulder and hip pain, no LOC. Patient had CT of the c-spine and head that were negative. Patient had xrays of the left forearm and found to have there is a fracture through the distal left ulnar metadiaphyseal junction.   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 Radius intact.. To note patient had over lying superficial skin tear to injured left forearm . Orthopedics consulted and wound care was applied to skin tear and splint was placed to forearm and lue was made non-weight bearing. Patient was seen by PT/OT PM&R and patient deemed not a candidate for rehab but due to no help at home would be a better candidate for faby. Patient was restarted on all home medications including coumadin, and most recent INR 2.34, and the faby they will continue to check daily INR and dose coumadin accordingly. Patient will followup with dr. Chery within 2 weeks of discharge, keep splint c/d/i

## 2018-04-04 NOTE — ED PROVIDER NOTE - CARE PLAN
Principal Discharge DX:	Fall, initial encounter  Secondary Diagnosis:	Skin tear of upper arm without complication, initial encounter

## 2018-04-04 NOTE — CONSULT NOTE ADULT - ATTENDING COMMENTS
Discussed with MARTA COLLINS at the time of admission. Agree with the noteand plan.  Left distal ulnar fracture and forearm skin abrasion.  PLAN: Splinting in a sugar-tong splint, follow up in office in one week for repeat x-ray.

## 2018-04-04 NOTE — ED ADULT NURSE REASSESSMENT NOTE - NS ED NURSE REASSESS COMMENT FT1
Report recvd from off going RN, pt recvd coming back from test, A&Ox3, reports lying in a position of comfort, reports 5/10 left lower arm pain, POC discussed with pt and family at bedside who verbalized understanding of plan and agree, findings reported to MD. Pt safety and comfort measures maintained.

## 2018-04-04 NOTE — H&P ADULT - PMH
Anemia    Asthma    Atrial fibrillation    CHF (congestive heart failure)    COPD (chronic obstructive pulmonary disease)    DVT (deep venous thrombosis)    GERD (gastroesophageal reflux disease)    Pulmonary embolism    TIA (transient ischemic attack) Anemia    Asthma    Atrial fibrillation    CHF (congestive heart failure)    COPD (chronic obstructive pulmonary disease)    DVT (deep venous thrombosis)    GERD (gastroesophageal reflux disease)    Pacemaker    Pulmonary embolism    TIA (transient ischemic attack)

## 2018-04-04 NOTE — ED PROVIDER NOTE - MUSCULOSKELETAL, MLM
right hip tenderness, pelvis stable,  left shoulder tenderness with crepitus and contusion. no midline C, T, L spine no stepoff no crepitus

## 2018-04-04 NOTE — ED PROVIDER NOTE - MEDICAL DECISION MAKING DETAILS
pt called as code trauma fall on coumadin airway intact, trauma team at bedside all care as trauma team

## 2018-04-04 NOTE — ED PROVIDER NOTE - SKIN, MLM
[id number] skin tear left forearm, ecchymosis left hand, punctate skin tear on right forearm, drainage from umbilicus 15x6cm skin tear left forearm, ecchymosis left hand, punctate skin tear on right forearm, drainage from umbilicus

## 2018-04-04 NOTE — ED ADULT NURSE REASSESSMENT NOTE - NS ED NURSE REASSESS COMMENT FT1
Pt presently having left hand splinted by Ortho, tolerating procedure well, call placed to accepting RN Dnana informing of pt status, RN informed and verbalizes understanding pending transport to unit.

## 2018-04-04 NOTE — ED ADULT TRIAGE NOTE - CHIEF COMPLAINT QUOTE
Patient BIBA, trip and fall on coumadin, patient awake, alert and oriented, code trauma B called prior to ED arrival, patient directed straight to trauma room upon ED arrival.

## 2018-04-05 LAB
ABO RH CONFIRMATION: SIGNIFICANT CHANGE UP
ANION GAP SERPL CALC-SCNC: 7 MMOL/L — SIGNIFICANT CHANGE UP (ref 5–17)
APPEARANCE UR: CLEAR — SIGNIFICANT CHANGE UP
BILIRUB UR-MCNC: NEGATIVE — SIGNIFICANT CHANGE UP
BUN SERPL-MCNC: 19 MG/DL — SIGNIFICANT CHANGE UP (ref 8–20)
CALCIUM SERPL-MCNC: 8.6 MG/DL — SIGNIFICANT CHANGE UP (ref 8.6–10.2)
CHLORIDE SERPL-SCNC: 105 MMOL/L — SIGNIFICANT CHANGE UP (ref 98–107)
CO2 SERPL-SCNC: 29 MMOL/L — SIGNIFICANT CHANGE UP (ref 22–29)
COLOR SPEC: YELLOW — SIGNIFICANT CHANGE UP
CREAT SERPL-MCNC: 0.72 MG/DL — SIGNIFICANT CHANGE UP (ref 0.5–1.3)
DIFF PNL FLD: NEGATIVE — SIGNIFICANT CHANGE UP
EOSINOPHIL # BLD AUTO: 0 K/UL — SIGNIFICANT CHANGE UP (ref 0–0.5)
EOSINOPHIL NFR BLD AUTO: 0.9 % — SIGNIFICANT CHANGE UP (ref 0–6)
GLUCOSE SERPL-MCNC: 114 MG/DL — SIGNIFICANT CHANGE UP (ref 70–115)
GLUCOSE UR QL: NEGATIVE MG/DL — SIGNIFICANT CHANGE UP
HCT VFR BLD CALC: 34.8 % — LOW (ref 37–47)
HGB BLD-MCNC: 10.5 G/DL — LOW (ref 12–16)
KETONES UR-MCNC: NEGATIVE — SIGNIFICANT CHANGE UP
LACTATE SERPL-SCNC: 1 MMOL/L — SIGNIFICANT CHANGE UP (ref 0.5–2)
LEUKOCYTE ESTERASE UR-ACNC: ABNORMAL
LYMPHOCYTES # BLD AUTO: 1.4 K/UL — SIGNIFICANT CHANGE UP (ref 1–4.8)
LYMPHOCYTES # BLD AUTO: 29.7 % — SIGNIFICANT CHANGE UP (ref 20–55)
MAGNESIUM SERPL-MCNC: 1.7 MG/DL — SIGNIFICANT CHANGE UP (ref 1.6–2.6)
MCHC RBC-ENTMCNC: 30.2 G/DL — LOW (ref 32–36)
MCHC RBC-ENTMCNC: 31.3 PG — HIGH (ref 27–31)
MCV RBC AUTO: 103.9 FL — HIGH (ref 81–99)
MONOCYTES # BLD AUTO: 0.6 K/UL — SIGNIFICANT CHANGE UP (ref 0–0.8)
MONOCYTES NFR BLD AUTO: 13.1 % — HIGH (ref 3–10)
NEUTROPHILS # BLD AUTO: 2.6 K/UL — SIGNIFICANT CHANGE UP (ref 1.8–8)
NEUTROPHILS NFR BLD AUTO: 56.1 % — SIGNIFICANT CHANGE UP (ref 37–73)
NITRITE UR-MCNC: NEGATIVE — SIGNIFICANT CHANGE UP
PH UR: 7 — SIGNIFICANT CHANGE UP (ref 5–8)
PHOSPHATE SERPL-MCNC: 2.9 MG/DL — SIGNIFICANT CHANGE UP (ref 2.4–4.7)
PLATELET # BLD AUTO: 114 K/UL — LOW (ref 150–400)
POTASSIUM SERPL-MCNC: 4.2 MMOL/L — SIGNIFICANT CHANGE UP (ref 3.5–5.3)
POTASSIUM SERPL-SCNC: 4.2 MMOL/L — SIGNIFICANT CHANGE UP (ref 3.5–5.3)
PROT UR-MCNC: 15 MG/DL
RBC # BLD: 3.35 M/UL — LOW (ref 4.4–5.2)
RBC # FLD: 17.5 % — HIGH (ref 11–15.6)
SODIUM SERPL-SCNC: 141 MMOL/L — SIGNIFICANT CHANGE UP (ref 135–145)
SP GR SPEC: 1.01 — SIGNIFICANT CHANGE UP (ref 1.01–1.02)
UROBILINOGEN FLD QL: NEGATIVE MG/DL — SIGNIFICANT CHANGE UP
WBC # BLD: 4.6 K/UL — LOW (ref 4.8–10.8)
WBC # FLD AUTO: 4.6 K/UL — LOW (ref 4.8–10.8)

## 2018-04-05 PROCEDURE — 73110 X-RAY EXAM OF WRIST: CPT | Mod: 26,LT

## 2018-04-05 PROCEDURE — 99222 1ST HOSP IP/OBS MODERATE 55: CPT | Mod: GC

## 2018-04-05 RX ORDER — FAMOTIDINE 10 MG/ML
20 INJECTION INTRAVENOUS DAILY
Qty: 0 | Refills: 0 | Status: DISCONTINUED | OUTPATIENT
Start: 2018-04-05 | End: 2018-04-11

## 2018-04-05 RX ORDER — FLUOXETINE HCL 10 MG
20 CAPSULE ORAL DAILY
Qty: 0 | Refills: 0 | Status: DISCONTINUED | OUTPATIENT
Start: 2018-04-05 | End: 2018-04-11

## 2018-04-05 RX ORDER — GABAPENTIN 400 MG/1
300 CAPSULE ORAL THREE TIMES A DAY
Qty: 0 | Refills: 0 | Status: DISCONTINUED | OUTPATIENT
Start: 2018-04-05 | End: 2018-04-11

## 2018-04-05 RX ORDER — MAGNESIUM SULFATE 500 MG/ML
2 VIAL (ML) INJECTION ONCE
Qty: 0 | Refills: 0 | Status: COMPLETED | OUTPATIENT
Start: 2018-04-05 | End: 2018-04-05

## 2018-04-05 RX ORDER — ATORVASTATIN CALCIUM 80 MG/1
20 TABLET, FILM COATED ORAL AT BEDTIME
Qty: 0 | Refills: 0 | Status: DISCONTINUED | OUTPATIENT
Start: 2018-04-05 | End: 2018-04-11

## 2018-04-05 RX ORDER — WARFARIN SODIUM 2.5 MG/1
4 TABLET ORAL DAILY
Qty: 0 | Refills: 0 | Status: COMPLETED | OUTPATIENT
Start: 2018-04-05 | End: 2018-04-07

## 2018-04-05 RX ORDER — SUCRALFATE 1 G
1 TABLET ORAL
Qty: 0 | Refills: 0 | Status: DISCONTINUED | OUTPATIENT
Start: 2018-04-05 | End: 2018-04-11

## 2018-04-05 RX ORDER — TIOTROPIUM BROMIDE 18 UG/1
1 CAPSULE ORAL; RESPIRATORY (INHALATION) DAILY
Qty: 0 | Refills: 0 | Status: DISCONTINUED | OUTPATIENT
Start: 2018-04-05 | End: 2018-04-11

## 2018-04-05 RX ORDER — OXYBUTYNIN CHLORIDE 5 MG
5 TABLET ORAL DAILY
Qty: 0 | Refills: 0 | Status: DISCONTINUED | OUTPATIENT
Start: 2018-04-05 | End: 2018-04-11

## 2018-04-05 RX ORDER — ATENOLOL 25 MG/1
25 TABLET ORAL DAILY
Qty: 0 | Refills: 0 | Status: DISCONTINUED | OUTPATIENT
Start: 2018-04-05 | End: 2018-04-11

## 2018-04-05 RX ORDER — FUROSEMIDE 40 MG
40 TABLET ORAL DAILY
Qty: 0 | Refills: 0 | Status: DISCONTINUED | OUTPATIENT
Start: 2018-04-05 | End: 2018-04-11

## 2018-04-05 RX ADMIN — WARFARIN SODIUM 4 MILLIGRAM(S): 2.5 TABLET ORAL at 00:56

## 2018-04-05 RX ADMIN — Medication 650 MILLIGRAM(S): at 05:45

## 2018-04-05 RX ADMIN — Medication 50 GRAM(S): at 23:55

## 2018-04-05 RX ADMIN — LIDOCAINE 2 PATCH: 4 CREAM TOPICAL at 16:35

## 2018-04-05 RX ADMIN — Medication 650 MILLIGRAM(S): at 16:36

## 2018-04-05 RX ADMIN — Medication 650 MILLIGRAM(S): at 12:57

## 2018-04-05 RX ADMIN — Medication 650 MILLIGRAM(S): at 06:45

## 2018-04-05 RX ADMIN — ATORVASTATIN CALCIUM 20 MILLIGRAM(S): 80 TABLET, FILM COATED ORAL at 23:54

## 2018-04-05 RX ADMIN — GABAPENTIN 300 MILLIGRAM(S): 400 CAPSULE ORAL at 05:45

## 2018-04-05 RX ADMIN — Medication 650 MILLIGRAM(S): at 00:56

## 2018-04-05 RX ADMIN — ATENOLOL 25 MILLIGRAM(S): 25 TABLET ORAL at 05:45

## 2018-04-05 RX ADMIN — Medication 5 MILLIGRAM(S): at 12:43

## 2018-04-05 RX ADMIN — Medication 100 MILLIGRAM(S): at 05:45

## 2018-04-05 RX ADMIN — Medication 1 GRAM(S): at 23:54

## 2018-04-05 RX ADMIN — GABAPENTIN 300 MILLIGRAM(S): 400 CAPSULE ORAL at 23:54

## 2018-04-05 RX ADMIN — GABAPENTIN 300 MILLIGRAM(S): 400 CAPSULE ORAL at 12:44

## 2018-04-05 RX ADMIN — WARFARIN SODIUM 4 MILLIGRAM(S): 2.5 TABLET ORAL at 23:53

## 2018-04-05 RX ADMIN — Medication 20 MILLIGRAM(S): at 12:43

## 2018-04-05 RX ADMIN — Medication 650 MILLIGRAM(S): at 01:56

## 2018-04-05 RX ADMIN — Medication 1 GRAM(S): at 05:45

## 2018-04-05 RX ADMIN — FAMOTIDINE 20 MILLIGRAM(S): 10 INJECTION INTRAVENOUS at 12:43

## 2018-04-05 RX ADMIN — Medication 1 GRAM(S): at 12:42

## 2018-04-05 RX ADMIN — Medication 40 MILLIGRAM(S): at 05:45

## 2018-04-05 RX ADMIN — Medication 1 GRAM(S): at 16:36

## 2018-04-05 RX ADMIN — Medication 650 MILLIGRAM(S): at 15:29

## 2018-04-05 RX ADMIN — Medication 650 MILLIGRAM(S): at 23:54

## 2018-04-05 NOTE — PROGRESS NOTE ADULT - ASSESSMENT
84F s/p mechanical fall from standing on coumadin with L distal ulnar fx  -reg diet  -pain control  -DVT ppx  -strict I/Os  -encourage OOB  -incentive spirometer  -PT/OT  -SW  -possible dc today  -f/u INR

## 2018-04-05 NOTE — PROGRESS NOTE ADULT - SUBJECTIVE AND OBJECTIVE BOX
Acute Care Surgery /Trauma PROGRESS NOTE:     SUBJECTIVE: Pt seen and examined at bedside. No acute overnight events. R forearm splinted by ortho. Shoulder pain improving, just sore no issues with ROM. Denies numbness/tingling in distal extremities. Pain well controlled. Tolerating fried chicken, no n/v. Voiding well. Passing flatus, no BM. Denies f/c/sob/cp. Not OOB yet    Vital Signs Last 24 Hrs  T(C): 36.1 (2018 21:51), Max: 36.3 (2018 19:36)  T(F): 97 (2018 21:51), Max: 97.4 (2018 19:36)  HR: 62 (2018 00:30) (59 - 64)  BP: 159/88 (2018 00:30) (152/74 - 160/83)  BP(mean): --  RR: 19 (2018 00:30) (18 - 19)  SpO2: 96% (2018 00:30) (96% - 97%)    OBJECTIVE:  NAD  NC/AT  RRR  No respiratory distress  Abd soft, nondistended, nontender, no guarding or rebound. No peritoneal signs.  L upper arm splint intact, motor and sensation intact with brisk cap refill in L hand  GCS15, moving all 4s  No pedal edema    I&O's Detail      MEDICATIONS  (STANDING):  acetaminophen   Tablet. 650 milliGRAM(s) Oral every 6 hours  ATENolol  Tablet 25 milliGRAM(s) Oral daily  atorvastatin 20 milliGRAM(s) Oral at bedtime  docusate sodium 100 milliGRAM(s) Oral three times a day  famotidine    Tablet 20 milliGRAM(s) Oral daily  FLUoxetine 20 milliGRAM(s) Oral daily  furosemide    Tablet 40 milliGRAM(s) Oral daily  gabapentin 300 milliGRAM(s) Oral three times a day  lidocaine   Patch 2 Patch Transdermal every 24 hours  oxybutynin 5 milliGRAM(s) Oral daily  sucralfate 1 Gram(s) Oral four times a day  tiotropium 18 MICROgram(s) Capsule 1 Capsule(s) Inhalation daily  warfarin 4 milliGRAM(s) Oral daily    MEDICATIONS  (PRN):  ondansetron Injectable 4 milliGRAM(s) IV Push every 6 hours PRN Nausea      LABS:                        10.5   4.6   )-----------( 114      ( 2018 04:12 )             34.8     04-    141  |  105  |  19.0  ----------------------------<  114  4.2   |  29.0  |  0.72    Ca    8.6      2018 04:12  Phos  2.9     04-05  Mg     1.7     04-05    TPro  6.7  /  Alb  4.1  /  TBili  0.9  /  DBili  x   /  AST  17  /  ALT  14  /  AlkPhos  42  04-04    PT/INR - ( 2018 18:08 )   PT: 21.9 sec;   INR: 1.96 ratio         PTT - ( 2018 18:08 )  PTT:40.4 sec  Urinalysis Basic - ( 2018 07:59 )    Color: Yellow / Appearance: Clear / S.010 / pH: x  Gluc: x / Ketone: Negative  / Bili: Negative / Urobili: Negative mg/dL   Blood: x / Protein: 15 mg/dL / Nitrite: Negative   Leuk Esterase: Trace / RBC: x / WBC 3-5   Sq Epi: x / Non Sq Epi: Occasional / Bacteria: x

## 2018-04-05 NOTE — CONSULT NOTE ADULT - SUBJECTIVE AND OBJECTIVE BOX
HPI:  84F extensive PMHx including Afib on coumadin, CAD, pacemaker, presented to [facility]  s/p mechanical fall, landing on her left side. Denies LOC. Upon admission she reported left hip and shoulder pain. CT of the brain showed no acute pathology. CT of the cervical spine showed severe spondylosis and spondylolisthesis of C7 on T1. X-ray of the left UE revealed a left distal ulna fracture. Orthopedics was consulted and she underwent closed reduction with the left UE placed in a sugar tong splint. She is currently NWB to the left UE.      REVIEW OF SYSTEMS  Constitutional - No fever, No fatigue  HEENT - No visual disturbances, No difficulty hearing,  No neck pain  Respiratory - No cough, No wheezing, No shortness of breath  Cardiovascular - No chest pain, No palpitations  Gastrointestinal - No abdominal pain, No nausea, No vomiting, No diarrhea, No constipation  Genitourinary - No dysuria, No frequency, No hematuria, No incontinence  Neurological - No headaches, No loss of strength, No numbness  Skin - No itching, No rashes  Endocrine - No temperature intolerance  Musculoskeletal - No joint pain, No joint swelling, No muscle pain  Psychiatric - No depression, No anxiety  All other review of systems negative    PAST MEDICAL & SURGICAL HISTORY  Pacemaker  TIA (transient ischemic attack)  DVT (deep venous thrombosis)  Pulmonary embolism  Anemia  GERD (gastroesophageal reflux disease)  CHF (congestive heart failure)  Asthma  COPD (chronic obstructive pulmonary disease)  Atrial fibrillation  Presence of inferior vena cava filter  H/O atrioventricular eva ablation  Age-related cataract of both eyes  Ptosis, both eyelids  Internal hemorrhoid  History of total knee arthroplasty, right  Rectal adenoma  History of lumbar laminectomy  History of cholecystectomy  H/O repair of right rotator cuff  H/O abdominal hysterectomy      SOCIAL HISTORY  Smoking - Denied  EtOH - Denied   Drugs - Denied    FUNCTIONAL HISTORY  _______ hand dominant  Lives with ______ in a ______ with ___ DONAVAN + HR and ___ STI + HR  Independent in ambulation, ADL's, transfers prior to hospitalization    CURRENT FUNCTIONAL STATUS  Bed mobility -   Transfers -   Gait -   ADL's -    FAMILY HISTORY   Reviewed and non-contributory    ALLERGIES  codeine (Unknown)  morphine (Unknown)    VITALS  T(C): 36.7 (2018 08:21), Max: 36.7 (2018 08:21)  T(F): 98 (2018 08:21), Max: 98 (2018 08:21)  HR: 60 (2018 08:21) (59 - 64)  BP: 131/68 (2018 08:21) (131/68 - 160/83)  RR: 19 (2018 08:21) (18 - 19)  SpO2: 96% (2018 08:21) (96% - 97%)      PHYSICAL EXAM  Constitutional - NAD, Comfortable  HEENT - NCAT, EOMI  Neck - Supple  Chest - CTA bilaterally  Cardiovascular - RRR, S1S2  Abdomen - BS+, Soft, NTND  Extremities - No C/C/E, No calf tenderness   Neurologic Exam -                    Cognitive - Awake, Alert, AAO to self, place, date, year, situation     Communication - Fluent, No dysarthria     Attention - Intact, able to recite days of week backwards     Memory - Intact, able to recall 3/3 items after 3 minutes     Cranial Nerves - CN 2-12 grossly intact     Motor -                     LEFT    UE - ShAB 5/5, EF 5/5, EE 5/5, WE 5/5,  5/5                    RIGHT UE - ShAB 5/5, EF 5/5, EE 5/5, WE 5/5,  5/5                    LEFT    LE - HF 5/5, KE 5/5, DF 5/5, PF 5/5                    RIGHT LE - HF 5/5, KE 5/5, DF 5/5, PF 5/5        Sensory - Intact to light touch     Reflexes - DTR intact and symmetrical, Negative Handley's bilaterally, Negative Babinski's bilaterally      Coordination - Finger-to-nose intact bilaterally   Psychiatric - Affect WNL    RECENT LABS/IMAGING                        10.5   4.6   )-----------( 114      ( 2018 04:12 )             34.8     04-05    141  |  105  |  19.0  ----------------------------<  114  4.2   |  29.0  |  0.72    Ca    8.6      2018 04:12  Phos  2.9     04-05  Mg     1.7     04-05    TPro  6.7  /  Alb  4.1  /  TBili  0.9  /  DBili  x   /  AST  17  /  ALT  14  /  AlkPhos  42  04-04    PT/INR - ( 2018 18:08 )   PT: 21.9 sec;   INR: 1.96 ratio         PTT - ( 2018 18:08 )  PTT:40.4 sec  Urinalysis Basic - ( 2018 07:59 )    Color: Yellow / Appearance: Clear / S.010 / pH: x  Gluc: x / Ketone: Negative  / Bili: Negative / Urobili: Negative mg/dL   Blood: x / Protein: 15 mg/dL / Nitrite: Negative   Leuk Esterase: Trace / RBC: x / WBC 3-5   Sq Epi: x / Non Sq Epi: Occasional / Bacteria: x      EXAM:  WRIST-LEFT                          PROCEDURE DATE:  2018      INTERPRETATION:  CLINICAL HISTORY:Injury with  Pain    TECHNIQUE: AP, lateral,scaphoid and oblique views of the left wrist were   obtained.    FINDINGS: There is diffuse osteopenia with a transverse fracture through   the head distal neck of the metadiaphyseal ulnar junction. There is   severe osteoarthritis of the carpal bones with widening of the scaphoid   lunate ligament likely indicating tear osteoarthritic narrowing of the   first metacarpal carpal joint space calcification of the triangle   fibrocartilage and diffuse soft tissue swelling.    IMPRESSION: Fracture of distal left ulnar as described..      MEDICATIONS   MEDICATIONS  (STANDING):  acetaminophen   Tablet. 650 milliGRAM(s) Oral every 6 hours  ATENolol  Tablet 25 milliGRAM(s) Oral daily  atorvastatin 20 milliGRAM(s) Oral at bedtime  docusate sodium 100 milliGRAM(s) Oral three times a day  famotidine    Tablet 20 milliGRAM(s) Oral daily  FLUoxetine 20 milliGRAM(s) Oral daily  furosemide    Tablet 40 milliGRAM(s) Oral daily  gabapentin 300 milliGRAM(s) Oral three times a day  lidocaine   Patch 2 Patch Transdermal every 24 hours  oxybutynin 5 milliGRAM(s) Oral daily  sucralfate 1 Gram(s) Oral four times a day  tiotropium 18 MICROgram(s) Capsule 1 Capsule(s) Inhalation daily  warfarin 4 milliGRAM(s) Oral daily    MEDICATIONS  (PRN):  ondansetron Injectable 4 milliGRAM(s) IV Push every 6 hours PRN Nausea      ASSESSMENT/PLAN  84F s/p mechanical fall sustaining left distal ulna fracture s/p sugar tong splint with functional, gait, ADL impairments.    Disposition -  PT - ROM, Bed mobility, Transfers, Ambulation with assistive device  OT - ADLs, ROM  Precautions - Falls, Cardiac  DVT Prophylaxis - Coumadin  Weight bearing status -NWB left UE  Skin - Turn Q2hrs  Diet - DASH/TLC HPI:  84F extensive PMHx including Afib on coumadin, CHF, pacemaker, COPD presented to [facility] / s/p mechanical fall. Patient reports she went outside to let her dog out and tripped on the deck landing on her left side. Denies LOC, was found by her niece about 20 minutes after and brought to the ED. Upon admission she reported left hip and shoulder pain. CT of the head was done showing no acute pathology. CT of the cervical spine showed severe spondylosis and spondylolisthesis of C7 on T1. X-ray of the left UE revealed a left distal ulna fracture. X-rays of the left shoulder, pelvis, humerus were negative for acute pathology. Orthopedics was consulted and she underwent closed reduction with the left UE placed in a sugar tong splint. She is currently NWB to the left UE.      REVIEW OF SYSTEMS  Constitutional - No fever, No fatigue  HEENT - No visual disturbances,  No neck pain  Respiratory - Chronic cough, denies SOB, usually wears 2L oxygen at bedtime only  Cardiovascular - No chest pain  Gastrointestinal - No abdominal pain, No nausea, No vomiting, +BM today  Genitourinary - No dysuria, No frequency, No incontinence  Neurological - No headaches, No loss of strength, No numbness, Chronic balance impairment and frequent falls, feels more off balance not being able to use left arm. Denies dizziness.  Skin - Skin tear over left forearm with fall  Musculoskeletal - Left hip, shoulder and forearm pain controlled with pain medications  Extremities - Bilateral feet feel "tight" and swollen at baseline  Psychiatric - No depression, No anxiety  All other review of systems negative    PAST MEDICAL & SURGICAL HISTORY  Pacemaker  TIA (transient ischemic attack)  DVT (deep venous thrombosis)  Pulmonary embolism  Anemia  GERD (gastroesophageal reflux disease)  CHF (congestive heart failure)  Asthma  COPD (chronic obstructive pulmonary disease)  Atrial fibrillation  Presence of inferior vena cava filter  OA  H/O atrioventricular eva ablation  Age-related cataract of both eyes  Ptosis, both eyelids  Internal hemorrhoid  History of total knee arthroplasty, right  Rectal adenoma  History of lumbar laminectomy  History of cholecystectomy  H/O repair of right rotator cuff  H/O abdominal hysterectomy      SOCIAL HISTORY  Smoking - Former smoker  EtOH - Denied   Drugs - Denied    FUNCTIONAL HISTORY  Right hand dominant  Lives with daughter and grandchildren in a private house with 2-3 DONAVAN, also has ramp to enter, no steps inside  Independent in ambulation with rollator within home and community. Independent in ADL's, transfers prior to hospitalization  Home is handicapped accessible, has walk in shower with chair and grab bars in bathroom    CURRENT FUNCTIONAL STATUS  Bed mobility -   Transfers -   Gait -   ADL's -    FAMILY HISTORY   Reviewed and non-contributory    ALLERGIES  codeine (Unknown)  morphine (Unknown)    VITALS  T(C): 36.7 (2018 08:21), Max: 36.7 (2018 08:21)  T(F): 98 (2018 08:21), Max: 98 (2018 08:21)  HR: 60 (2018 08:21) (59 - 64)  BP: 131/68 (2018 08:21) (131/68 - 160/83)  RR: 19 (2018 08:21) (18 - 19)  SpO2: 96% (2018 08:21) (96% - 97%)      PHYSICAL EXAM  Constitutional - NAD, Comfortable  HEENT - NCAT, EOMI  Neck - Supple  Chest - CTA bilaterally, dry cough  Cardiovascular - RRR, S1S2  Abdomen - BS+, Soft, NTND  Extremities - Trace edema bilateral LE, chronic vascular changes bilateral LE, Left UE in sling and splint - neurovascularly intact  Neurologic Exam -                    Cognitive - Awake, Alert, AAO to self, place, date, year, situation     Communication - Fluent     Cranial Nerves - CN 2-12 grossly intact     Motor -                     LEFT    UE - Able to wiggle fingers                    RIGHT UE - ShAB 5/5, EF 5/5, EE 5/5, WE 5/5,  5/5                    LEFT    LE - HF 5/5, KE 5/5, DF 5/5, PF 5/5                    RIGHT LE - HF 5/5, KE 5/5, DF 5/5, PF 5/5        Sensory - Intact to light touch     Proprioception - Intact at great toe bilaterally    Psychiatric - Affect WNL    RECENT LABS/IMAGING                        10.5   4.6   )-----------( 114      ( 2018 04:12 )             34.8     04-05    141  |  105  |  19.0  ----------------------------<  114  4.2   |  29.0  |  0.72    Ca    8.6      2018 04:12  Phos  2.9     04-05  Mg     1.7     04-05    TPro  6.7  /  Alb  4.1  /  TBili  0.9  /  DBili  x   /  AST  17  /  ALT  14  /  AlkPhos  42  -    PT/INR - ( 2018 18:08 )   PT: 21.9 sec;   INR: 1.96 ratio         PTT - ( 2018 18:08 )  PTT:40.4 sec  Urinalysis Basic - ( 2018 07:59 )    Color: Yellow / Appearance: Clear / S.010 / pH: x  Gluc: x / Ketone: Negative  / Bili: Negative / Urobili: Negative mg/dL   Blood: x / Protein: 15 mg/dL / Nitrite: Negative   Leuk Esterase: Trace / RBC: x / WBC 3-5   Sq Epi: x / Non Sq Epi: Occasional / Bacteria: x      EXAM:  WRIST-LEFT                          PROCEDURE DATE:  2018      INTERPRETATION:  CLINICAL HISTORY:Injury with  Pain    TECHNIQUE: AP, lateral,scaphoid and oblique views of the left wrist were   obtained.    FINDINGS: There is diffuse osteopenia with a transverse fracture through   the head distal neck of the metadiaphyseal ulnar junction. There is   severe osteoarthritis of the carpal bones with widening of the scaphoid   lunate ligament likely indicating tear osteoarthritic narrowing of the   first metacarpal carpal joint space calcification of the triangle   fibrocartilage and diffuse soft tissue swelling.    IMPRESSION: Fracture of distal left ulnar as described.      MEDICATIONS   MEDICATIONS  (STANDING):  acetaminophen   Tablet. 650 milliGRAM(s) Oral every 6 hours  ATENolol  Tablet 25 milliGRAM(s) Oral daily  atorvastatin 20 milliGRAM(s) Oral at bedtime  docusate sodium 100 milliGRAM(s) Oral three times a day  famotidine    Tablet 20 milliGRAM(s) Oral daily  FLUoxetine 20 milliGRAM(s) Oral daily  furosemide    Tablet 40 milliGRAM(s) Oral daily  gabapentin 300 milliGRAM(s) Oral three times a day  lidocaine   Patch 2 Patch Transdermal every 24 hours  oxybutynin 5 milliGRAM(s) Oral daily  sucralfate 1 Gram(s) Oral four times a day  tiotropium 18 MICROgram(s) Capsule 1 Capsule(s) Inhalation daily  warfarin 4 milliGRAM(s) Oral daily    MEDICATIONS  (PRN):  ondansetron Injectable 4 milliGRAM(s) IV Push every 6 hours PRN Nausea      ASSESSMENT/PLAN  84F s/p mechanical fall sustaining left distal ulna fracture s/p sugar tong splint with functional, gait, ADL, balance impairments.    Disposition -  PT - ROM, Bed mobility, Transfers, Ambulation with assistive device  OT - ADLs, ROM  Precautions - Falls, Cardiac  DVT Prophylaxis - Coumadin  Weight bearing status -NWB left UE  Skin - Turn Q2hrs  Diet - DASH/TLC HPI:  84F extensive PMHx including Afib on coumadin, CHF, pacemaker, COPD presented to [facility] / s/p mechanical fall. Patient reports she went outside to let her dog out and tripped on the deck landing on her left side. Denies LOC, was found by her niece about 20 minutes after and brought to the ED. Upon admission she reported left hip and shoulder pain. CT of the head was done showing no acute pathology. CT of the cervical spine showed severe spondylosis and spondylolisthesis of C7 on T1. X-ray of the left UE revealed a left distal ulna fracture. X-rays of the left shoulder, pelvis, humerus were negative for acute pathology. Orthopedics was consulted and she underwent closed reduction with the left UE placed in a sugar tong splint. She is currently NWB to the left UE.      REVIEW OF SYSTEMS  Constitutional - No fever, No fatigue  HEENT - No visual disturbances,  No neck pain  Respiratory - Chronic cough, denies SOB, usually wears 2L oxygen at bedtime only  Cardiovascular - No chest pain  Gastrointestinal - No abdominal pain, No nausea, No vomiting, +BM today  Genitourinary - No dysuria, No frequency, No incontinence  Neurological - No headaches, No loss of strength, chronic numbness in bilateral feet, Chronic balance impairment and frequent falls, feels more off balance not being able to use left arm. Denies dizziness.  Skin - Skin tear over left forearm with fall  Musculoskeletal - Forearm pain controlled with pain medications. Left shoulder and hip pain resolved  Extremities - Bilateral feet feel "tight" and swollen at baseline  Psychiatric - No depression, No anxiety  All other review of systems negative    PAST MEDICAL & SURGICAL HISTORY  Pacemaker  TIA (transient ischemic attack)  DVT (deep venous thrombosis)  Pulmonary embolism  Anemia  GERD (gastroesophageal reflux disease)  CHF (congestive heart failure)  Asthma  COPD (chronic obstructive pulmonary disease)  Atrial fibrillation  Presence of inferior vena cava filter  OA  H/O atrioventricular eva ablation  Age-related cataract of both eyes  Ptosis, both eyelids  Internal hemorrhoid  History of total knee arthroplasty, right  Rectal adenoma  Ruptured synovial cyst s/p of lumbar laminectomy and decompression ()  History of cholecystectomy  H/O repair of right rotator cuff  H/O abdominal hysterectomy      SOCIAL HISTORY  Smoking - Former smoker  EtOH - Denied   Drugs - Denied    FUNCTIONAL HISTORY  Right hand dominant  Lives with daughter and grandchildren in a private house with 2-3 DONAVAN, also has ramp to enter, no steps inside  Independent in ambulation with rollator within home and community. Independent in ADL's, transfers prior to hospitalization  Home is handicapped accessible, has walk in shower with chair to sit for bathing, grab bars in bathroom    CURRENT FUNCTIONAL STATUS  To be evaluated by therapy    FAMILY HISTORY   Reviewed and non-contributory    ALLERGIES  codeine (Unknown)  morphine (Unknown)    VITALS  T(C): 36.7 (2018 08:21), Max: 36.7 (2018 08:21)  T(F): 98 (2018 08:21), Max: 98 (2018 08:21)  HR: 60 (2018 08:21) (59 - 64)  BP: 131/68 (2018 08:21) (131/68 - 160/83)  RR: 19 (2018 08:21) (18 - 19)  SpO2: 96% (2018 08:21) (96% - 97%)      PHYSICAL EXAM  Constitutional - NAD, Comfortable  HEENT - NCAT, EOMI  Neck - Supple  Chest - CTA bilaterally, dry cough  Cardiovascular - RRR, S1S2  Abdomen - BS+, Soft, NTND  Extremities - Trace edema bilateral LE, chronic vascular changes bilateral LE, Left UE in sling and splint - neurovascularly intact  Neurologic Exam -                    Cognitive - Awake, Alert, AAO to self, place, date, year, situation     Communication - Fluent     Cranial Nerves - CN 2-12 grossly intact     Motor -                     LEFT    UE - Able to wiggle fingers                    RIGHT UE - ShAB 5/5, EF 5/5, EE 5/5, WE 5/5,  5/5                    LEFT    LE - HF 5/5, KE 5/5, DF 5/5, PF 5/5                    RIGHT LE - HF 5/5, KE 5/5, DF 5/5, PF 5/5        Sensory - Intact to light touch in bilateral UE and LE     Proprioception - Intact at great toe bilaterally    Psychiatric - Affect WNL    RECENT LABS/IMAGING                        10.5   4.6   )-----------( 114      ( 2018 04:12 )             34.8     04-    141  |  105  |  19.0  ----------------------------<  114  4.2   |  29.0  |  0.72    Ca    8.6      2018 04:12  Phos  2.9     04-05  Mg     1.7     04-05    TPro  6.7  /  Alb  4.1  /  TBili  0.9  /  DBili  x   /  AST  17  /  ALT  14  /  AlkPhos  42  04-04    PT/INR - ( 2018 18:08 )   PT: 21.9 sec;   INR: 1.96 ratio         PTT - ( 2018 18:08 )  PTT:40.4 sec  Urinalysis Basic - ( 2018 07:59 )    Color: Yellow / Appearance: Clear / S.010 / pH: x  Gluc: x / Ketone: Negative  / Bili: Negative / Urobili: Negative mg/dL   Blood: x / Protein: 15 mg/dL / Nitrite: Negative   Leuk Esterase: Trace / RBC: x / WBC 3-5   Sq Epi: x / Non Sq Epi: Occasional / Bacteria: x      EXAM:  WRIST-LEFT                          PROCEDURE DATE:  2018      INTERPRETATION:  CLINICAL HISTORY:Injury with  Pain    TECHNIQUE: AP, lateral,scaphoid and oblique views of the left wrist were   obtained.    FINDINGS: There is diffuse osteopenia with a transverse fracture through   the head distal neck of the metadiaphyseal ulnar junction. There is   severe osteoarthritis of the carpal bones with widening of the scaphoid   lunate ligament likely indicating tear osteoarthritic narrowing of the   first metacarpal carpal joint space calcification of the triangle   fibrocartilage and diffuse soft tissue swelling.    IMPRESSION: Fracture of distal left ulnar as described.      MEDICATIONS   MEDICATIONS  (STANDING):  acetaminophen   Tablet. 650 milliGRAM(s) Oral every 6 hours  ATENolol  Tablet 25 milliGRAM(s) Oral daily  atorvastatin 20 milliGRAM(s) Oral at bedtime  docusate sodium 100 milliGRAM(s) Oral three times a day  famotidine    Tablet 20 milliGRAM(s) Oral daily  FLUoxetine 20 milliGRAM(s) Oral daily  furosemide    Tablet 40 milliGRAM(s) Oral daily  gabapentin 300 milliGRAM(s) Oral three times a day  lidocaine   Patch 2 Patch Transdermal every 24 hours  oxybutynin 5 milliGRAM(s) Oral daily  sucralfate 1 Gram(s) Oral four times a day  tiotropium 18 MICROgram(s) Capsule 1 Capsule(s) Inhalation daily  warfarin 4 milliGRAM(s) Oral daily    MEDICATIONS  (PRN):  ondansetron Injectable 4 milliGRAM(s) IV Push every 6 hours PRN Nausea      ASSESSMENT/PLAN  84F s/p mechanical fall sustaining left distal ulna fracture s/p closed reduction and splint presenting with functional, ADL, balance impairments.    Disposition - Recommend RADHA at this time. Will follow-up after therapy evaluation.  PT - ROM, Bed mobility, Transfers, Ambulation with assistive device, balance training.  OT - ADLs, ROM  Precautions - Falls, Cardiac  DVT Prophylaxis - Coumadin  Weight bearing status -NWB left UE  Skin - Turn Q2hrs  Diet - DASH/TLC HPI:  84F extensive PMHx including Afib on coumadin, CHF, pacemaker, COPD presented to [facility] / s/p mechanical fall. Patient reports she went outside to let her dog out and tripped on the deck landing on her left side. Denies LOC, was found by her niece about 20 minutes after and brought to the ED. Upon admission she reported left hip and shoulder pain. CT of the head was done showing no acute pathology. CT of the cervical spine showed severe spondylosis and spondylolisthesis of C7 on T1. X-ray of the left UE revealed a left distal ulna fracture. X-rays of the left shoulder, pelvis, humerus were negative for acute pathology. Orthopedics was consulted and she underwent closed reduction with the left UE placed in a sugar tong splint. She is currently NWB to the left UE.      REVIEW OF SYSTEMS  Constitutional - No fever, No fatigue  HEENT - No visual disturbances,  No neck pain  Respiratory - Chronic cough, denies SOB, usually wears 2L oxygen at bedtime only  Cardiovascular - No chest pain  Gastrointestinal - No abdominal pain, No nausea, No vomiting, +BM today  Genitourinary - No dysuria, No frequency, No incontinence  Neurological - No headaches, No loss of strength, chronic numbness in bilateral feet, Chronic balance impairment and frequent falls, feels more off balance not being able to use left arm. Denies dizziness.  Skin - Skin tear over left forearm with fall  Musculoskeletal - Forearm pain controlled with pain medications. Left shoulder and hip pain resolved  Extremities - Bilateral feet feel "tight" and swollen at baseline  Psychiatric - No depression, No anxiety  All other review of systems negative    PAST MEDICAL & SURGICAL HISTORY  Pacemaker  TIA (transient ischemic attack)  DVT (deep venous thrombosis)  Pulmonary embolism  Anemia  GERD (gastroesophageal reflux disease)  CHF (congestive heart failure)  Asthma  COPD (chronic obstructive pulmonary disease)  Atrial fibrillation  Presence of inferior vena cava filter  OA  H/O atrioventricular eva ablation  Age-related cataract of both eyes  Ptosis, both eyelids  Internal hemorrhoid  History of total knee arthroplasty, right  Rectal adenoma  Ruptured synovial cyst s/p of lumbar laminectomy and decompression ()  History of cholecystectomy  H/O repair of right rotator cuff  H/O abdominal hysterectomy      SOCIAL HISTORY  Smoking - Former smoker  EtOH - Denied   Drugs - Denied    FUNCTIONAL HISTORY  Right hand dominant  Lives with daughter and grandchildren in a private house with 2-3 DONAVAN, also has ramp to enter, no steps inside  Independent in ambulation with rollator within home and community. Independent in ADL's, transfers prior to hospitalization  Home is handicapped accessible, has walk in shower with chair to sit for bathing, grab bars in bathroom    CURRENT FUNCTIONAL STATUS  To be evaluated by therapy    FAMILY HISTORY   Reviewed and non-contributory    ALLERGIES  codeine (Unknown)  morphine (Unknown)    VITALS  T(C): 36.7 (2018 08:21), Max: 36.7 (2018 08:21)  T(F): 98 (2018 08:21), Max: 98 (2018 08:21)  HR: 60 (2018 08:21) (59 - 64)  BP: 131/68 (2018 08:21) (131/68 - 160/83)  RR: 19 (2018 08:21) (18 - 19)  SpO2: 96% (2018 08:21) (96% - 97%)      PHYSICAL EXAM  Constitutional - NAD, Comfortable  HEENT - NCAT, EOMI, ecchymosis left chin  Neck - Supple  Chest - CTA bilaterally, dry cough  Cardiovascular - RRR, S1S2  Abdomen - BS+, Soft, NTND  Extremities - Trace edema bilateral LE, chronic vascular changes bilateral LE, Left UE in sling and splint - neurovascularly intact  Neurologic Exam -                    Cognitive - Awake, Alert, AAO to self, place, date, year, situation     Communication - Fluent     Cranial Nerves - CN 2-12 grossly intact     Motor -                     LEFT    UE - Able to wiggle all fingers                    RIGHT UE - ShAB 5/5, EF 5/5, EE 5/5, WE 5/5,  5/5                    LEFT    LE - HF 5/5, KE 5/5, DF 5/5, PF 5/5                    RIGHT LE - HF 5/5, KE 5/5, DF 5/5, PF 5/5        Sensory - Intact to light touch in bilateral UE and LE     Proprioception - Intact at great toe bilaterally    Psychiatric - Affect WNL    RECENT LABS/IMAGING                        10.5   4.6   )-----------( 114      ( 2018 04:12 )             34.8     04-05    141  |  105  |  19.0  ----------------------------<  114  4.2   |  29.0  |  0.72    Ca    8.6      2018 04:12  Phos  2.9     04-05  Mg     1.7     04-05    TPro  6.7  /  Alb  4.1  /  TBili  0.9  /  DBili  x   /  AST  17  /  ALT  14  /  AlkPhos  42  04-04    PT/INR - ( 2018 18:08 )   PT: 21.9 sec;   INR: 1.96 ratio         PTT - ( 2018 18:08 )  PTT:40.4 sec  Urinalysis Basic - ( 2018 07:59 )    Color: Yellow / Appearance: Clear / S.010 / pH: x  Gluc: x / Ketone: Negative  / Bili: Negative / Urobili: Negative mg/dL   Blood: x / Protein: 15 mg/dL / Nitrite: Negative   Leuk Esterase: Trace / RBC: x / WBC 3-5   Sq Epi: x / Non Sq Epi: Occasional / Bacteria: x      EXAM:  WRIST-LEFT                          PROCEDURE DATE:  2018      INTERPRETATION:  CLINICAL HISTORY:Injury with  Pain    TECHNIQUE: AP, lateral,scaphoid and oblique views of the left wrist were   obtained.    FINDINGS: There is diffuse osteopenia with a transverse fracture through   the head distal neck of the metadiaphyseal ulnar junction. There is   severe osteoarthritis of the carpal bones with widening of the scaphoid   lunate ligament likely indicating tear osteoarthritic narrowing of the   first metacarpal carpal joint space calcification of the triangle   fibrocartilage and diffuse soft tissue swelling.    IMPRESSION: Fracture of distal left ulnar as described.      MEDICATIONS   MEDICATIONS  (STANDING):  acetaminophen   Tablet. 650 milliGRAM(s) Oral every 6 hours  ATENolol  Tablet 25 milliGRAM(s) Oral daily  atorvastatin 20 milliGRAM(s) Oral at bedtime  docusate sodium 100 milliGRAM(s) Oral three times a day  famotidine    Tablet 20 milliGRAM(s) Oral daily  FLUoxetine 20 milliGRAM(s) Oral daily  furosemide    Tablet 40 milliGRAM(s) Oral daily  gabapentin 300 milliGRAM(s) Oral three times a day  lidocaine   Patch 2 Patch Transdermal every 24 hours  oxybutynin 5 milliGRAM(s) Oral daily  sucralfate 1 Gram(s) Oral four times a day  tiotropium 18 MICROgram(s) Capsule 1 Capsule(s) Inhalation daily  warfarin 4 milliGRAM(s) Oral daily    MEDICATIONS  (PRN):  ondansetron Injectable 4 milliGRAM(s) IV Push every 6 hours PRN Nausea      ASSESSMENT/PLAN  84F s/p mechanical fall sustaining left distal ulna fracture s/p closed reduction and splint presenting with functional, ADL, balance impairments.    Disposition - Recommend RADHA at this time. Will follow-up after therapy evaluation.  PT - ROM, Bed mobility, Transfers, Ambulation with assistive device, balance training.  OT - ADLs, ROM  Precautions - Falls, Cardiac  DVT Prophylaxis - Coumadin  Weight bearing status -NWB left UE  Skin - Turn Q2hrs  Diet - DASH/TLC HPI:  84F extensive PMHx including Afib on coumadin, CHF, pacemaker, COPD presented to [facility] / s/p mechanical fall. Patient reports she went outside to let her dog out and tripped on the deck landing on her left side. Denies LOC, was found by her niece about 20 minutes after and brought to the ED. Upon admission she reported left hip and shoulder pain. CT of the head was done showing no acute pathology. CT of the cervical spine showed severe spondylosis and spondylolisthesis of C7 on T1. X-ray of the left UE revealed a left distal ulna fracture. X-rays of the left shoulder, pelvis, humerus were negative for acute pathology. Orthopedics was consulted and she underwent closed reduction with the left UE placed in a sugar tong splint. She is currently NWB to the left UE.      REVIEW OF SYSTEMS  Constitutional - No fever, No fatigue  HEENT - No visual disturbances,  No neck pain  Respiratory - Chronic cough, denies SOB, usually wears 2L oxygen at bedtime only  Cardiovascular - No chest pain  Gastrointestinal - No abdominal pain, No nausea, No vomiting, +BM today  Genitourinary - No dysuria, No frequency, No incontinence  Neurological - No headaches, No loss of strength, chronic numbness in bilateral feet, Chronic balance impairment and frequent falls, feels more off balance not being able to use left arm. Denies dizziness.  Skin - Skin tear over left forearm with fall  Musculoskeletal - Forearm pain controlled with pain medications. Left shoulder and hip pain resolved  Extremities - Bilateral feet feel "tight" and swollen at baseline  Psychiatric - No depression, No anxiety  All other review of systems negative    PAST MEDICAL & SURGICAL HISTORY  Pacemaker  TIA (transient ischemic attack)  DVT (deep venous thrombosis)  Pulmonary embolism  Anemia  GERD (gastroesophageal reflux disease)  CHF (congestive heart failure)  Asthma  COPD (chronic obstructive pulmonary disease)  Atrial fibrillation  Presence of inferior vena cava filter  OA  H/O atrioventricular eva ablation  Age-related cataract of both eyes  Ptosis, both eyelids  Internal hemorrhoid  History of total knee arthroplasty, right  Rectal adenoma  Ruptured synovial cyst s/p of lumbar laminectomy and decompression ()  History of cholecystectomy  H/O repair of right rotator cuff  H/O abdominal hysterectomy      SOCIAL HISTORY  Smoking - Former smoker  EtOH - Denied   Drugs - Denied    FUNCTIONAL HISTORY  Right hand dominant  Lives with daughter and grandchildren in a private house with 2-3 DONAVAN, also has ramp to enter, no steps inside  Independent in ambulation with rollator within home and community. Independent in ADL's, transfers prior to hospitalization  Home is handicapped accessible, has walk in shower with chair to sit for bathing, grab bars in bathroom    CURRENT FUNCTIONAL STATUS  To be evaluated by therapy    FAMILY HISTORY   Reviewed and non-contributory    ALLERGIES  codeine (Unknown)  morphine (Unknown)    VITALS  T(C): 36.7 (2018 08:21), Max: 36.7 (2018 08:21)  T(F): 98 (2018 08:21), Max: 98 (2018 08:21)  HR: 60 (2018 08:21) (59 - 64)  BP: 131/68 (2018 08:21) (131/68 - 160/83)  RR: 19 (2018 08:21) (18 - 19)  SpO2: 96% (2018 08:21) (96% - 97%)      PHYSICAL EXAM  Constitutional - NAD, Comfortable  HEENT - NCAT, EOMI, ecchymosis left chin  Neck - Supple  Chest - CTA bilaterally, dry cough  Cardiovascular - RRR, S1S2  Abdomen - BS+, Soft, NTND  Extremities - Trace edema bilateral LE, chronic vascular changes bilateral LE, Left UE in sling and splint - neurovascularly intact  Neurologic Exam -                    Cognitive - Awake, Alert, AAO to self, place, date, year, situation     Communication - Fluent     Cranial Nerves - CN 2-12 grossly intact     Motor -                     LEFT    UE - Able to wiggle all fingers                    RIGHT UE - ShAB 5/5, EF 5/5, EE 5/5, WE 5/5,  5/5                    LEFT    LE - HF 5/5, KE 5/5, DF 5/5, PF 5/5                    RIGHT LE - HF 5/5, KE 5/5, DF 5/5, PF 5/5        Sensory - Intact to light touch in bilateral UE and LE     Proprioception - Intact at great toe bilaterally    Psychiatric - Affect WNL    RECENT LABS/IMAGING                        10.5   4.6   )-----------( 114      ( 2018 04:12 )             34.8     04-05    141  |  105  |  19.0  ----------------------------<  114  4.2   |  29.0  |  0.72    Ca    8.6      2018 04:12  Phos  2.9     04-05  Mg     1.7     04-05    TPro  6.7  /  Alb  4.1  /  TBili  0.9  /  DBili  x   /  AST  17  /  ALT  14  /  AlkPhos  42  04-04    PT/INR - ( 2018 18:08 )   PT: 21.9 sec;   INR: 1.96 ratio         PTT - ( 2018 18:08 )  PTT:40.4 sec  Urinalysis Basic - ( 2018 07:59 )    Color: Yellow / Appearance: Clear / S.010 / pH: x  Gluc: x / Ketone: Negative  / Bili: Negative / Urobili: Negative mg/dL   Blood: x / Protein: 15 mg/dL / Nitrite: Negative   Leuk Esterase: Trace / RBC: x / WBC 3-5   Sq Epi: x / Non Sq Epi: Occasional / Bacteria: x      EXAM:  WRIST-LEFT                          PROCEDURE DATE:  2018      INTERPRETATION:  CLINICAL HISTORY:Injury with  Pain    TECHNIQUE: AP, lateral,scaphoid and oblique views of the left wrist were   obtained.    FINDINGS: There is diffuse osteopenia with a transverse fracture through   the head distal neck of the metadiaphyseal ulnar junction. There is   severe osteoarthritis of the carpal bones with widening of the scaphoid   lunate ligament likely indicating tear osteoarthritic narrowing of the   first metacarpal carpal joint space calcification of the triangle   fibrocartilage and diffuse soft tissue swelling.    IMPRESSION: Fracture of distal left ulnar as described.      MEDICATIONS   MEDICATIONS  (STANDING):  acetaminophen   Tablet. 650 milliGRAM(s) Oral every 6 hours  ATENolol  Tablet 25 milliGRAM(s) Oral daily  atorvastatin 20 milliGRAM(s) Oral at bedtime  docusate sodium 100 milliGRAM(s) Oral three times a day  famotidine    Tablet 20 milliGRAM(s) Oral daily  FLUoxetine 20 milliGRAM(s) Oral daily  furosemide    Tablet 40 milliGRAM(s) Oral daily  gabapentin 300 milliGRAM(s) Oral three times a day  lidocaine   Patch 2 Patch Transdermal every 24 hours  oxybutynin 5 milliGRAM(s) Oral daily  sucralfate 1 Gram(s) Oral four times a day  tiotropium 18 MICROgram(s) Capsule 1 Capsule(s) Inhalation daily  warfarin 4 milliGRAM(s) Oral daily    MEDICATIONS  (PRN):  ondansetron Injectable 4 milliGRAM(s) IV Push every 6 hours PRN Nausea      ASSESSMENT/PLAN  84F s/p mechanical fall sustaining left distal ulna fracture s/p closed reduction and splint presenting with functional, ADL, balance impairments.    Disposition - Recommend RADHA at this time. Will follow-up after therapy evaluation.  PT - ROM, Bed mobility, Transfers, Ambulation with assistive device, balance training.  Precautions - Falls, Cardiac  DVT Prophylaxis - Coumadin  Weight bearing status -NWB left UE  Skin - Turn Q2hrs  Diet - DASH/TLC

## 2018-04-05 NOTE — CONSULT NOTE ADULT - ATTENDING COMMENTS
84 year old female with medical comorbidities as listed above admitted after a fall without any associated LOC. w/u showed a left distal ulnar fracture. S/P Closed reduction. NWB.  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  Was also admitted to [facility] earlier this year after a fall sustaining left rib fractures.     In view of left forearm fracture and non eight bearing status, recommend course of subacute rehab to improve overall mobility. Continue PT while here in hospital  Thank you. d/w CCC 84 year old female with medical comorbidities as listed above admitted after a fall without any associated LOC. w/u showed a left distal ulnar fracture. S/P Closed reduction. NWB.  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  Was also admitted to [facility] earlier this year after a fall sustaining left rib fractures.     In view of left forearm fracture and non weight bearing status, recommend course of subacute rehab to improve overall mobility. Continue PT while here in hospital  Thank you. d/w [facility]

## 2018-04-05 NOTE — PATIENT PROFILE ADULT. - PAIN CHRONIC, PROFILE
Today's Date:  2017   Patient:  Judson Desouza  Patient :  1957    Subjective:     Chief Complaint   Patient presents with    Follow Up Chronic Condition     pt. states she decrease insulin to 1 time a day  10 units because whe she took it twice a day she was getting sick and not feeling well. Pt. states she is feeling better     Back Pain      Pt. c/o back pain x 2 years        Judson Desouza is a 61 y.o. female who presents for follow up Chronic Diseases. BP today is at goal today <130/80.     States that fasting blood sugars are at around 91. Has hypoglycemic episodes at times with the last one being today 41, she ate something and felt better. Last HA1c was 6.6  Medications regimen is as follows: Was prescribed Lantus 20 unites daily but started having hypoglycemic episodes then reduced on her own to 10 units daily and started feeling better. Daily exercise and diet are as follows: Does push ups, dances and attempts sit ups when her back permits.    Weight is stable    Is compliant with medication and denies side effects.    Patient is on a statin denies myalgia      She also c/o back pain. This is a chronic problem and is been follow by [facility] . States that she can not go to PT because she is taking care of a relative who is on hospice. Current Outpatient Meds and Allergies     Current Outpatient Prescriptions on File Prior to Visit   Medication Sig Dispense Refill    methocarbamol (ROBAXIN) 500 mg tablet TAKE ONE TABLET BY MOUTH 2 TO 3 TIMES DAILY AS NEEDED FOR SPASMS 75 Tab 0    gabapentin (NEURONTIN) 300 mg capsule Take 1-2 po q am and 1-2 po q evening 120 Cap 3    pravastatin (PRAVACHOL) 40 mg tablet TAKE ONE TABLET BY MOUTH NIGHTLY. 30 Tab 2    LANTUS 100 unit/mL injection INJECT 20 UNITS SUBCUTANEOUSLY EVERY 12 HOURS 10 Vial 5    sertraline (ZOLOFT) 50 mg tablet Take 1 Tab by mouth daily.  30 Tab 3    LANTUS 100 unit/mL injection INJECT 20 UNITS SUBCUTANEOUSLY EVERY 12 HOURS 20 Vial 0    meloxicam (MOBIC) 15 mg tablet TAKE ONE TABLET BY MOUTH ONCE DAILY-- take with food and use as needed 30 Tab 4    pravastatin (PRAVACHOL) 40 mg tablet Take 1 Tab by mouth nightly. 90 Tab 1    glucose blood VI test strips (FREESTYLE LITE STRIPS) strip Check glucose ACHS. 200 Strip 11    insulin lispro (HUMALOG) 100 unit/mL injection 10 Units by SubCUTAneous route Before breakfast, lunch, and dinner. 1 Vial 2    insulin glargine (LANTUS) 100 unit/mL injection 20 Units by SubCUTAneous route every twelve (12) hours. 2 Vial 2    Blood-Glucose Meter (FREESTYLE LITE METER) monitoring kit accucheck ACHS and record in log. Take log to every doctor visit. **MEDICALLY NECESSARY** 1 Kit 0    insulin lispro (HUMALOG) 100 unit/mL injection For Blood Sugar (mg/dL) of: <150 =   0 units; 150 -199 =   2 units; 200 -249 =   4 units; 250 -299 =   6 units; 300 -349 = 8 units; 350 and above =   10 units 1 Vial 2    alcohol swabs padm 1 Each by Apply Externally route Before breakfast, lunch, dinner and at bedtime. accucheck ACHS and record in log. Take log to every doctor visit. **MEDICALLY NECESSARY** 100 Pad 2    traZODone (DESYREL) 150 mg tablet Take 600 mg by mouth nightly.  sertraline (ZOLOFT) 100 mg tablet Take 100 mg by mouth daily.  perphenazine (TRILAFON) 4 mg tablet Take 4 mg by mouth three (3) times daily.  benztropine (COGENTIN) 1 mg tablet Take 1 mg by mouth daily.  methocarbamol (ROBAXIN) 500 mg tablet TAKE ONE TABLET BY MOUTH 2 TO 3 TIMES DAILY AS NEEDED FOR SPASMS 75 Tab 0    lisinopril (PRINIVIL, ZESTRIL) 10 mg tablet Take 1 Tab by mouth daily. 90 Tab 1    predniSONE (DELTASONE) 10 mg tablet 2 pills in am for 3 days, then 1 pill in am for 3 days and 1/2 pill in am for remainder 11 Tab 0    HYDROcodone-acetaminophen (NORCO) 5-325 mg per tablet Take 1 Tab by mouth every eight (8) hours as needed for Pain. Max Daily Amount: 3 Tabs.  20 Tab 0    Lancets misc Check glucose ACHS 200 Each 11    tiZANidine (ZANAFLEX) 2 mg tablet 1 tablet three times a day 90 Tab 3     No current facility-administered medications on file prior to visit. These medications have been reviewed and reconciled with the patient during today's visit. Allergies   Allergen Reactions    Latex Rash    Penicillins Nausea and Vomiting         ROS:     CONST:   Denies fatigue, weight change, appetite change   NEURO:   Denies headaches, vision changes, dizziness, loss of consciousness  CV:      Denies chest pain, palpitations, orthopnea, PND  PULM:  Denies SOB, wheezing, cough, hemoptysis  GI:             Denies nausea, vomiting, abdominal pain, greasy stools, blood in stool,     diarrhea, constipation  :       Denies dysuria, hematuria, change in urine  MS:      Back pain, joint swelling  SKIN:        Denies rashes, skin changes  ALLERGY: Denies seasonal allergies, itchy eyes    Objective:     VS:    Visit Vitals    /82 (BP 1 Location: Left arm, BP Patient Position: Sitting)    Pulse 83    Temp 98.1 °F (36.7 °C) (Oral)    Resp 18    Ht 5' 5\" (1.651 m)    Wt 172 lb (78 kg)    SpO2 97%    BMI 28.62 kg/m2       General:   Well-nourished, well-groomed, pleasant, alert, in no acute distress. Neck:  Neck supple with normal ROM for age, no thyromegaly,  Cardiovasc:   Regular rate and rhythm, no murmurs, no rubs, no gallops,   Pulmonary:   Clear breath sounds bilaterally, good air movement, no wheezing, no rales, no rhonchi, normal respiratory effort  Abdomen:   Abdomen soft, nontender, nondistended, NABS,  Extremities:   No edema, no tenderness with palpation of calves, warm and well-perfused  Neuro:   Alert, conversant, appropriate, following commands, no focal deficits.      Pertinent diagnostic procedures include:  Hospital Outpatient Visit on 09/23/2017   Component Date Value Ref Range Status    Hemoglobin A1c 09/23/2017 6.6* 4.2 - 5.6 % Final    Comment: (NOTE)  HbA1C Interpretive Ranges  <5.7 Normal  5.7 - 6.4         Consider Prediabetes  >6.5              Consider Diabetes      Est. average glucose 09/23/2017 143  mg/dL Final    Comment: (NOTE)  The eAG should be interpreted with patient characteristics in mind   since ethnicity, interindividual differences, red cell lifespan,   variation in rates of glycation, etc. may affect the validity of the   calculation.  Sodium 09/23/2017 141  136 - 145 mmol/L Final    Potassium 09/23/2017 3.9  3.5 - 5.5 mmol/L Final    Chloride 09/23/2017 106  100 - 108 mmol/L Final    CO2 09/23/2017 28  21 - 32 mmol/L Final    Anion gap 09/23/2017 7  3.0 - 18 mmol/L Final    Glucose 09/23/2017 91  74 - 99 mg/dL Final    BUN 09/23/2017 13  7.0 - 18 MG/DL Final    Creatinine 09/23/2017 0.89  0.6 - 1.3 MG/DL Final    BUN/Creatinine ratio 09/23/2017 15  12 - 20   Final    GFR est AA 09/23/2017 >60  >60 ml/min/1.73m2 Final    GFR est non-AA 09/23/2017 >60  >60 ml/min/1.73m2 Final    Comment: (NOTE)  Estimated GFR is calculated using the Modification of Diet in Renal   Disease (MDRD) Study equation, reported for both  Americans   (GFRAA) and non- Americans (GFRNA), and normalized to 1.73m2   body surface area. The physician must decide which value applies to   the patient. The MDRD study equation should only be used in   individuals age 25 or older. It has not been validated for the   following: pregnant women, patients with serious comorbid conditions,   or on certain medications, or persons with extremes of body size,   muscle mass, or nutritional status.  Calcium 09/23/2017 9.4  8.5 - 10.1 MG/DL Final    Bilirubin, total 09/23/2017 0.6  0.2 - 1.0 MG/DL Final    ALT (SGPT) 09/23/2017 40  13 - 56 U/L Final    AST (SGOT) 09/23/2017 21  15 - 37 U/L Final    Alk.  phosphatase 09/23/2017 61  45 - 117 U/L Final    Protein, total 09/23/2017 7.9  6.4 - 8.2 g/dL Final    Albumin 09/23/2017 3.8  3.4 - 5.0 g/dL Final    Globulin 09/23/2017 4. 1* 2.0 - 4.0 g/dL Final    A-G Ratio 09/23/2017 0.9  0.8 - 1.7   Final    LIPID PROFILE 09/23/2017        Final    Cholesterol, total 09/23/2017 191  <200 MG/DL Final    Triglyceride 09/23/2017 136  <150 MG/DL Final    Comment: The drugs N-acetylcysteine (NAC) and  Metamiszole have been found to cause falsely  low results in this chemical assay. Please  be sure to submit blood samples obtained  BEFORE administration of either of these  drugs to assure correct results.  HDL Cholesterol 09/23/2017 65* 40 - 60 MG/DL Final    LDL, calculated 09/23/2017 98.8  0 - 100 MG/DL Final    VLDL, calculated 09/23/2017 27.2  MG/DL Final    CHOL/HDL Ratio 09/23/2017 2.9  0 - 5.0   Final       Assessment:       1. Uncontrolled type 2 diabetes mellitus without complication, with long-term current use of insulin ([address]    2. Mixed hyperlipidemia    3. Osteoarthritis of spine with radiculopathy, cervical region    4. Essential hypertension        Plan:       Orders Placed This Encounter    pravastatin (PRAVACHOL) 40 mg tablet     Sig: TAKE ONE TABLET BY MOUTH NIGHTLY. Dispense:  30 Tab     Refill:  2     Please consider 90 day supplies to promote better adherence     Diabetes: advised to continue the Lantus 10 units (since 20 units cause hypoglycemic episodes), take BS twice a day, record x 1 week and bring log to the office then it will be decided if she will nee to take the original dose of 20 units in divided dose. Back Pain: take medication as prescribed and follow up with [facility]   Medication refilled. I have discussed the diagnosis with the patient and the intended plan as seen in the above orders. The patient has received an after-visit summary along with patient information handout. I have discussed medication side effects and warnings with the patient as well. Pt verbalized understanding. Follow-up Disposition:  Return in about 3 months (around 3/18/2018).   SHEILA Bianchi  12/18/2017, 5:38 PM no

## 2018-04-06 LAB
APTT BLD: 41.6 SEC — HIGH (ref 27.5–37.4)
INR BLD: 2.02 RATIO — HIGH (ref 0.88–1.16)
PROTHROM AB SERPL-ACNC: 22.5 SEC — HIGH (ref 9.8–12.7)

## 2018-04-06 PROCEDURE — 99232 SBSQ HOSP IP/OBS MODERATE 35: CPT

## 2018-04-06 RX ADMIN — Medication 20 MILLIGRAM(S): at 15:06

## 2018-04-06 RX ADMIN — Medication 1 GRAM(S): at 23:56

## 2018-04-06 RX ADMIN — Medication 40 MILLIGRAM(S): at 05:44

## 2018-04-06 RX ADMIN — WARFARIN SODIUM 4 MILLIGRAM(S): 2.5 TABLET ORAL at 22:28

## 2018-04-06 RX ADMIN — ATORVASTATIN CALCIUM 20 MILLIGRAM(S): 80 TABLET, FILM COATED ORAL at 22:28

## 2018-04-06 RX ADMIN — Medication 100 MILLIGRAM(S): at 15:06

## 2018-04-06 RX ADMIN — GABAPENTIN 300 MILLIGRAM(S): 400 CAPSULE ORAL at 22:28

## 2018-04-06 RX ADMIN — Medication 1 GRAM(S): at 05:44

## 2018-04-06 RX ADMIN — Medication 650 MILLIGRAM(S): at 06:14

## 2018-04-06 RX ADMIN — TIOTROPIUM BROMIDE 1 CAPSULE(S): 18 CAPSULE ORAL; RESPIRATORY (INHALATION) at 08:44

## 2018-04-06 RX ADMIN — Medication 1 GRAM(S): at 15:05

## 2018-04-06 RX ADMIN — Medication 650 MILLIGRAM(S): at 16:03

## 2018-04-06 RX ADMIN — Medication 5 MILLIGRAM(S): at 15:05

## 2018-04-06 RX ADMIN — Medication 100 MILLIGRAM(S): at 22:27

## 2018-04-06 RX ADMIN — Medication 650 MILLIGRAM(S): at 15:05

## 2018-04-06 RX ADMIN — GABAPENTIN 300 MILLIGRAM(S): 400 CAPSULE ORAL at 15:05

## 2018-04-06 RX ADMIN — Medication 650 MILLIGRAM(S): at 00:24

## 2018-04-06 RX ADMIN — Medication 650 MILLIGRAM(S): at 23:57

## 2018-04-06 RX ADMIN — GABAPENTIN 300 MILLIGRAM(S): 400 CAPSULE ORAL at 05:44

## 2018-04-06 RX ADMIN — Medication 650 MILLIGRAM(S): at 05:44

## 2018-04-06 RX ADMIN — LIDOCAINE 2 PATCH: 4 CREAM TOPICAL at 05:49

## 2018-04-06 RX ADMIN — ATENOLOL 25 MILLIGRAM(S): 25 TABLET ORAL at 05:44

## 2018-04-06 RX ADMIN — FAMOTIDINE 20 MILLIGRAM(S): 10 INJECTION INTRAVENOUS at 15:05

## 2018-04-06 RX ADMIN — LIDOCAINE 2 PATCH: 4 CREAM TOPICAL at 15:06

## 2018-04-06 RX ADMIN — Medication 650 MILLIGRAM(S): at 17:56

## 2018-04-06 RX ADMIN — Medication 1 GRAM(S): at 16:42

## 2018-04-06 NOTE — OCCUPATIONAL THERAPY INITIAL EVALUATION ADULT - ADDITIONAL COMMENTS
pt states lives with family and is on first floor doesn't have to do stairs; pt was having frequent falls and used a rollator which helped with her falls. This fall was letting out the dog and pt walked without device and tripped. Pt has hurricane/tripod cane at home and can use that as pt is NWB left UE and can not use rollator at this time. Pt states during day when nobody can help her walk she will use commode next to couch or bed and stay short distances. Will speak with PT on eval for device safety.

## 2018-04-06 NOTE — OCCUPATIONAL THERAPY INITIAL EVALUATION ADULT - RANGE OF MOTION EXAMINATION, UPPER EXTREMITY
left UE not tested in elbow and wrist and hand 2 in splint/Right UE Active ROM was WNL (within normal limits)

## 2018-04-06 NOTE — PROGRESS NOTE ADULT - ASSESSMENT
84F s/p mechanical fall from standing on coumadin with L distal ulnar fx  -Continue DASH diet  -PRN pain control  -DVT ppx  -strict I/Os  -OOB, IS  -PT/OT/SW  -Plan for dc today

## 2018-04-06 NOTE — PROGRESS NOTE ADULT - SUBJECTIVE AND OBJECTIVE BOX
INTERVAL SUBJECTIVE & REVIEW OF SYMPTOMS:   Patient states that she feels well. Denies any new pain.         MEDICATIONS:  acetaminophen   Tablet. 650 milliGRAM(s) Oral every 6 hours  ATENolol  Tablet 25 milliGRAM(s) Oral daily  atorvastatin 20 milliGRAM(s) Oral at bedtime  docusate sodium 100 milliGRAM(s) Oral three times a day  famotidine    Tablet 20 milliGRAM(s) Oral daily  FLUoxetine 20 milliGRAM(s) Oral daily  furosemide    Tablet 40 milliGRAM(s) Oral daily  gabapentin 300 milliGRAM(s) Oral three times a day  lidocaine   Patch 2 Patch Transdermal every 24 hours  ondansetron Injectable 4 milliGRAM(s) IV Push every 6 hours PRN  oxybutynin 5 milliGRAM(s) Oral daily  sucralfate 1 Gram(s) Oral four times a day  tiotropium 18 MICROgram(s) Capsule 1 Capsule(s) Inhalation daily  warfarin 4 milliGRAM(s) Oral daily      REVIEW OF SYSTEMS  [x   ] Constitutional WNL  [ x  ] Cardio WNL  [ x  ] Resp WNL  [  x ] GI WNL  [   ]  WNL    VITAL SIGNS  Vital Signs Last 24 Hrs  T(C): 36.6 (2018 07:34), Max: 36.8 (2018 16:26)  T(F): 97.9 (2018 07:34), Max: 98.2 (2018 16:26)  HR: 74 (2018 08:45) (61 - 74)  BP: 114/72 (2018 07:34) (102/64 - 136/73)  BP(mean): --  RR: 17 (2018 07:34) (16 - 18)  SpO2: 97% (2018 07:34) (97% - 98%)    PHYSICAL EXAM  General: NAD, comfortable in bed  Cardio: S1S2+  Resp: Clear  Abdomen: soft  Extrem:   Skin:   Functional Exam:     RECENT LABS:                        10.5   4.6   )-----------( 114      ( 2018 04:12 )             34.8     04-    141  |  105  |  19.0  ----------------------------<  114  4.2   |  29.0  |  0.72    Ca    8.6      2018 04:12  Phos  2.9     04-05  Mg     1.7     04-05    TPro  6.7  /  Alb  4.1  /  TBili  0.9  /  DBili  x   /  AST  17  /  ALT  14  /  AlkPhos  42  04-04    PT/INR - ( 2018 07:31 )   PT: 22.5 sec;   INR: 2.02 ratio         PTT - ( 2018 07:31 )  PTT:41.6 sec  Urinalysis Basic - ( 2018 07:59 )    Color: Yellow / Appearance: Clear / S.010 / pH: x  Gluc: x / Ketone: Negative  / Bili: Negative / Urobili: Negative mg/dL   Blood: x / Protein: 15 mg/dL / Nitrite: Negative   Leuk Esterase: Trace / RBC: x / WBC 3-5   Sq Epi: x / Non Sq Epi: Occasional / Bacteria: x        RADIOLOGY/OTHER RESULTS: INTERVAL SUBJECTIVE & REVIEW OF SYMPTOMS: Patient states her name is Conchita Joyce  Patient states that she feels well. Denies any new pain.         MEDICATIONS:  acetaminophen   Tablet. 650 milliGRAM(s) Oral every 6 hours  ATENolol  Tablet 25 milliGRAM(s) Oral daily  atorvastatin 20 milliGRAM(s) Oral at bedtime  docusate sodium 100 milliGRAM(s) Oral three times a day  famotidine    Tablet 20 milliGRAM(s) Oral daily  FLUoxetine 20 milliGRAM(s) Oral daily  furosemide    Tablet 40 milliGRAM(s) Oral daily  gabapentin 300 milliGRAM(s) Oral three times a day  lidocaine   Patch 2 Patch Transdermal every 24 hours  ondansetron Injectable 4 milliGRAM(s) IV Push every 6 hours PRN  oxybutynin 5 milliGRAM(s) Oral daily  sucralfate 1 Gram(s) Oral four times a day  tiotropium 18 MICROgram(s) Capsule 1 Capsule(s) Inhalation daily  warfarin 4 milliGRAM(s) Oral daily      REVIEW OF SYSTEMS  [x   ] Constitutional WNL  [ x  ] Cardio WNL  [ x  ] Resp WNL  [  x ] GI WNL  [   ]  WNL    VITAL SIGNS  Vital Signs Last 24 Hrs  T(C): 36.6 (2018 07:34), Max: 36.8 (2018 16:26)  T(F): 97.9 (2018 07:34), Max: 98.2 (2018 16:26)  HR: 74 (2018 08:45) (61 - 74)  BP: 114/72 (2018 07:34) (102/64 - 136/73)  BP(mean): --  RR: 17 (2018 07:34) (16 - 18)  SpO2: 97% (2018 07:34) (97% - 98%)    PHYSICAL EXAM  General: NAD, comfortable in bed  Cardio: S1S2+  Resp: Clear  Abdomen: soft, obese  Extrem: LUE in splint and ace wrap, moving fingers well, sensation intact to light touch    Functional Exam: Bed mobility max assist,     RECENT LABS:                        10.5   4.6   )-----------( 114      ( 2018 04:12 )             34.8     04-05    141  |  105  |  19.0  ----------------------------<  114  4.2   |  29.0  |  0.72    Ca    8.6      2018 04:12  Phos  2.9     04-05  Mg     1.7     04-05    TPro  6.7  /  Alb  4.1  /  TBili  0.9  /  DBili  x   /  AST  17  /  ALT  14  /  AlkPhos  42  04-04    PT/INR - ( 2018 07:31 )   PT: 22.5 sec;   INR: 2.02 ratio         PTT - ( 2018 07:31 )  PTT:41.6 sec  Urinalysis Basic - ( 2018 07:59 )    Color: Yellow / Appearance: Clear / S.010 / pH: x  Gluc: x / Ketone: Negative  / Bili: Negative / Urobili: Negative mg/dL   Blood: x / Protein: 15 mg/dL / Nitrite: Negative   Leuk Esterase: Trace / RBC: x / WBC 3-5   Sq Epi: x / Non Sq Epi: Occasional / Bacteria: x    RADIOLOGY/OTHER RESULTS:    A/P:    84 year old female with medical comorbidities including afib, COPD, TIA, history of DVT/PE admitted after a fall without any associated LOC. w/u showed a left distal ulnar fracture. S/P Closed reduction. NWB.  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    In view of left forearm fracture and non eight bearing status, recommend course of subacute rehab to improve overall mobility. Continue PT while here in hospital. d/w PT for evaluation.  Will sign off. Anticipate discharge soon.  Thank you.

## 2018-04-06 NOTE — PROGRESS NOTE ADULT - SUBJECTIVE AND OBJECTIVE BOX
INTERVAL HPI/OVERNIGHT EVENTS: No acute events overnight    SUBJECTIVE: States pain controlled this AM with PO non-narcotic pain control. Afebrile. Tolerating diet. Ambulating without assistance. +Urination. + Flatus. +BM. Denies F/C/N/V/CP/SOB.       MEDICATIONS  (STANDING):  acetaminophen   Tablet. 650 milliGRAM(s) Oral every 6 hours  ATENolol  Tablet 25 milliGRAM(s) Oral daily  atorvastatin 20 milliGRAM(s) Oral at bedtime  docusate sodium 100 milliGRAM(s) Oral three times a day  famotidine    Tablet 20 milliGRAM(s) Oral daily  FLUoxetine 20 milliGRAM(s) Oral daily  furosemide    Tablet 40 milliGRAM(s) Oral daily  gabapentin 300 milliGRAM(s) Oral three times a day  lidocaine   Patch 2 Patch Transdermal every 24 hours  oxybutynin 5 milliGRAM(s) Oral daily  sucralfate 1 Gram(s) Oral four times a day  tiotropium 18 MICROgram(s) Capsule 1 Capsule(s) Inhalation daily  warfarin 4 milliGRAM(s) Oral daily    MEDICATIONS  (PRN):  ondansetron Injectable 4 milliGRAM(s) IV Push every 6 hours PRN Nausea      Vital Signs Last 24 Hrs  T(C): 36.6 (2018 07:34), Max: 36.8 (2018 16:26)  T(F): 97.9 (2018 07:34), Max: 98.2 (2018 16:26)  HR: 74 (2018 08:45) (61 - 74)  BP: 114/72 (2018 07:34) (102/64 - 136/73)  BP(mean): --  RR: 17 (2018 07:34) (16 - 18)  SpO2: 97% (2018 07:34) (97% - 98%)    PE  Gen: AAO x3, NAD  Pulm: CTAB, Symmetrical chest rise  CV: RRR  Abd: Soft, NT, ND, -R/-G  Ext: No C/C/E  Vasc: 2+ Radial, DP pulses  Neuro: No focal neurological deficits      I&O's Detail      LABS:                        10.5   4.6   )-----------( 114      ( 2018 04:12 )             34.8     04-05    141  |  105  |  19.0  ----------------------------<  114  4.2   |  29.0  |  0.72    Ca    8.6      2018 04:12  Phos  2.9     04-05  Mg     1.7     -05    TPro  6.7  /  Alb  4.1  /  TBili  0.9  /  DBili  x   /  AST  17  /  ALT  14  /  AlkPhos  42  04-04    PT/INR - ( 2018 07:31 )   PT: 22.5 sec;   INR: 2.02 ratio         PTT - ( 2018 07:31 )  PTT:41.6 sec  Urinalysis Basic - ( 2018 07:59 )    Color: Yellow / Appearance: Clear / S.010 / pH: x  Gluc: x / Ketone: Negative  / Bili: Negative / Urobili: Negative mg/dL   Blood: x / Protein: 15 mg/dL / Nitrite: Negative   Leuk Esterase: Trace / RBC: x / WBC 3-5   Sq Epi: x / Non Sq Epi: Occasional / Bacteria: x INTERVAL HPI/OVERNIGHT EVENTS: No acute events overnight    SUBJECTIVE: States pain controlled this AM with PO non-narcotic pain control. Afebrile. Tolerating diet. OOB to chair with assistance. +Urination. + Flatus. +BM. Denies F/C/N/V/CP/SOB.       MEDICATIONS  (STANDING):  acetaminophen   Tablet. 650 milliGRAM(s) Oral every 6 hours  ATENolol  Tablet 25 milliGRAM(s) Oral daily  atorvastatin 20 milliGRAM(s) Oral at bedtime  docusate sodium 100 milliGRAM(s) Oral three times a day  famotidine    Tablet 20 milliGRAM(s) Oral daily  FLUoxetine 20 milliGRAM(s) Oral daily  furosemide    Tablet 40 milliGRAM(s) Oral daily  gabapentin 300 milliGRAM(s) Oral three times a day  lidocaine   Patch 2 Patch Transdermal every 24 hours  oxybutynin 5 milliGRAM(s) Oral daily  sucralfate 1 Gram(s) Oral four times a day  tiotropium 18 MICROgram(s) Capsule 1 Capsule(s) Inhalation daily  warfarin 4 milliGRAM(s) Oral daily    MEDICATIONS  (PRN):  ondansetron Injectable 4 milliGRAM(s) IV Push every 6 hours PRN Nausea      Vital Signs Last 24 Hrs  T(C): 36.6 (2018 07:34), Max: 36.8 (2018 16:26)  T(F): 97.9 (2018 07:34), Max: 98.2 (2018 16:26)  HR: 74 (2018 08:45) (61 - 74)  BP: 114/72 (2018 07:34) (102/64 - 136/73)  BP(mean): --  RR: 17 (2018 07:34) (16 - 18)  SpO2: 97% (2018 07:34) (97% - 98%)    PE  Gen: AAO x3, NAD  Pulm: CTAB, Symmetrical chest rise  CV: RRR  Abd: Soft, NT, ND, -R/-G  Ext: LUE with superficial skin tear, xeroform replaced and redressed, No C/C/E  Vasc: 2+ Radial, DP pulses  Neuro: No focal neurological deficits      I&O's Detail      LABS:                        10.5   4.6   )-----------( 114      ( 2018 04:12 )             34.8     04-05    141  |  105  |  19.0  ----------------------------<  114  4.2   |  29.0  |  0.72    Ca    8.6      2018 04:12  Phos  2.9     04-05  Mg     1.7     04-05    TPro  6.7  /  Alb  4.1  /  TBili  0.9  /  DBili  x   /  AST  17  /  ALT  14  /  AlkPhos  42  04-04    PT/INR - ( 2018 07:31 )   PT: 22.5 sec;   INR: 2.02 ratio         PTT - ( 2018 07:31 )  PTT:41.6 sec  Urinalysis Basic - ( 2018 07:59 )    Color: Yellow / Appearance: Clear / S.010 / pH: x  Gluc: x / Ketone: Negative  / Bili: Negative / Urobili: Negative mg/dL   Blood: x / Protein: 15 mg/dL / Nitrite: Negative   Leuk Esterase: Trace / RBC: x / WBC 3-5   Sq Epi: x / Non Sq Epi: Occasional / Bacteria: x

## 2018-04-07 LAB
INR BLD: 2.37 RATIO — HIGH (ref 0.88–1.16)
PROTHROM AB SERPL-ACNC: 26.5 SEC — HIGH (ref 9.8–12.7)

## 2018-04-07 RX ADMIN — TIOTROPIUM BROMIDE 1 CAPSULE(S): 18 CAPSULE ORAL; RESPIRATORY (INHALATION) at 08:50

## 2018-04-07 RX ADMIN — Medication 5 MILLIGRAM(S): at 11:03

## 2018-04-07 RX ADMIN — FAMOTIDINE 20 MILLIGRAM(S): 10 INJECTION INTRAVENOUS at 11:03

## 2018-04-07 RX ADMIN — GABAPENTIN 300 MILLIGRAM(S): 400 CAPSULE ORAL at 06:17

## 2018-04-07 RX ADMIN — Medication 40 MILLIGRAM(S): at 06:18

## 2018-04-07 RX ADMIN — Medication 20 MILLIGRAM(S): at 11:03

## 2018-04-07 RX ADMIN — Medication 650 MILLIGRAM(S): at 11:03

## 2018-04-07 RX ADMIN — Medication 650 MILLIGRAM(S): at 01:52

## 2018-04-07 RX ADMIN — GABAPENTIN 300 MILLIGRAM(S): 400 CAPSULE ORAL at 21:33

## 2018-04-07 RX ADMIN — Medication 1 GRAM(S): at 11:03

## 2018-04-07 RX ADMIN — ATORVASTATIN CALCIUM 20 MILLIGRAM(S): 80 TABLET, FILM COATED ORAL at 21:33

## 2018-04-07 RX ADMIN — WARFARIN SODIUM 4 MILLIGRAM(S): 2.5 TABLET ORAL at 21:33

## 2018-04-07 RX ADMIN — Medication 650 MILLIGRAM(S): at 11:04

## 2018-04-07 RX ADMIN — LIDOCAINE 2 PATCH: 4 CREAM TOPICAL at 03:00

## 2018-04-07 RX ADMIN — ATENOLOL 25 MILLIGRAM(S): 25 TABLET ORAL at 06:18

## 2018-04-07 RX ADMIN — Medication 650 MILLIGRAM(S): at 06:18

## 2018-04-07 RX ADMIN — Medication 1 GRAM(S): at 06:17

## 2018-04-07 NOTE — PROGRESS NOTE ADULT - ASSESSMENT
84F s/p mechanical fall from standing on coumadin with L distal ulnar fx  -Continue DASH diet  -PRN pain control  -DVT ppx  -strict I/Os  -OOB, IS  -PT/OT/SW  -Plan for dc today pending PT eval

## 2018-04-07 NOTE — PROGRESS NOTE ADULT - SUBJECTIVE AND OBJECTIVE BOX
INTERVAL HPI/OVERNIGHT EVENTS: No acute events overnight    SUBJECTIVE: Denies pain this AM. Afebrile. Tolerating diet. Ambulating to commode with assistance. +Urination. + Flatus. +BM. Denies F/C/N/V/CP/SOB.       MEDICATIONS  (STANDING):  acetaminophen   Tablet. 650 milliGRAM(s) Oral every 6 hours  ATENolol  Tablet 25 milliGRAM(s) Oral daily  atorvastatin 20 milliGRAM(s) Oral at bedtime  docusate sodium 100 milliGRAM(s) Oral three times a day  famotidine    Tablet 20 milliGRAM(s) Oral daily  FLUoxetine 20 milliGRAM(s) Oral daily  furosemide    Tablet 40 milliGRAM(s) Oral daily  gabapentin 300 milliGRAM(s) Oral three times a day  lidocaine   Patch 2 Patch Transdermal every 24 hours  oxybutynin 5 milliGRAM(s) Oral daily  sucralfate 1 Gram(s) Oral four times a day  tiotropium 18 MICROgram(s) Capsule 1 Capsule(s) Inhalation daily  warfarin 4 milliGRAM(s) Oral daily    MEDICATIONS  (PRN):  ondansetron Injectable 4 milliGRAM(s) IV Push every 6 hours PRN Nausea      Vital Signs Last 24 Hrs  T(C): 36.6 (07 Apr 2018 08:00), Max: 36.7 (06 Apr 2018 15:35)  T(F): 97.9 (07 Apr 2018 08:00), Max: 98.1 (06 Apr 2018 15:35)  HR: 59 (07 Apr 2018 08:00) (59 - 78)  BP: 115/73 (07 Apr 2018 08:00) (112/64 - 128/68)  BP(mean): --  RR: 18 (07 Apr 2018 08:00) (18 - 18)  SpO2: 98% (07 Apr 2018 08:00) (95% - 98%)    PE  Gen: AAO x3, NAD  Pulm: CTAB, Symmetrical chest rise  CV: RRR  Abd: Soft, NT, ND, -R/-G  Ext: LUE with superficial skin tear, covered with xeroform. NO surrounding erythema or signs of infection. No C/C/E  Vasc: 2+ Radial, DP pulses  Neuro: No focal neurological deficits      I&O's Detail      LABS:          PT/INR - ( 06 Apr 2018 07:31 )   PT: 22.5 sec;   INR: 2.02 ratio         PTT - ( 06 Apr 2018 07:31 )  PTT:41.6 sec

## 2018-04-08 DIAGNOSIS — S41.119A LACERATION WITHOUT FOREIGN BODY OF UNSPECIFIED UPPER ARM, INITIAL ENCOUNTER: ICD-10-CM

## 2018-04-08 LAB
INR BLD: 2.38 RATIO — HIGH (ref 0.88–1.16)
PROTHROM AB SERPL-ACNC: 26.6 SEC — HIGH (ref 9.8–12.7)

## 2018-04-08 RX ORDER — WARFARIN SODIUM 2.5 MG/1
4 TABLET ORAL AT BEDTIME
Qty: 0 | Refills: 0 | Status: COMPLETED | OUTPATIENT
Start: 2018-04-08 | End: 2018-04-08

## 2018-04-08 RX ADMIN — Medication 1 GRAM(S): at 01:09

## 2018-04-08 RX ADMIN — Medication 650 MILLIGRAM(S): at 05:16

## 2018-04-08 RX ADMIN — GABAPENTIN 300 MILLIGRAM(S): 400 CAPSULE ORAL at 05:16

## 2018-04-08 RX ADMIN — Medication 1 GRAM(S): at 22:30

## 2018-04-08 RX ADMIN — Medication 650 MILLIGRAM(S): at 06:46

## 2018-04-08 RX ADMIN — Medication 650 MILLIGRAM(S): at 16:59

## 2018-04-08 RX ADMIN — Medication 650 MILLIGRAM(S): at 02:21

## 2018-04-08 RX ADMIN — LIDOCAINE 2 PATCH: 4 CREAM TOPICAL at 16:59

## 2018-04-08 RX ADMIN — Medication 650 MILLIGRAM(S): at 12:25

## 2018-04-08 RX ADMIN — Medication 5 MILLIGRAM(S): at 11:29

## 2018-04-08 RX ADMIN — Medication 40 MILLIGRAM(S): at 05:16

## 2018-04-08 RX ADMIN — GABAPENTIN 300 MILLIGRAM(S): 400 CAPSULE ORAL at 22:30

## 2018-04-08 RX ADMIN — Medication 100 MILLIGRAM(S): at 22:31

## 2018-04-08 RX ADMIN — ATORVASTATIN CALCIUM 20 MILLIGRAM(S): 80 TABLET, FILM COATED ORAL at 22:30

## 2018-04-08 RX ADMIN — Medication 20 MILLIGRAM(S): at 11:28

## 2018-04-08 RX ADMIN — Medication 650 MILLIGRAM(S): at 01:09

## 2018-04-08 RX ADMIN — GABAPENTIN 300 MILLIGRAM(S): 400 CAPSULE ORAL at 13:06

## 2018-04-08 RX ADMIN — Medication 1 GRAM(S): at 17:00

## 2018-04-08 RX ADMIN — TIOTROPIUM BROMIDE 1 CAPSULE(S): 18 CAPSULE ORAL; RESPIRATORY (INHALATION) at 08:17

## 2018-04-08 RX ADMIN — ATENOLOL 25 MILLIGRAM(S): 25 TABLET ORAL at 05:15

## 2018-04-08 RX ADMIN — WARFARIN SODIUM 4 MILLIGRAM(S): 2.5 TABLET ORAL at 22:30

## 2018-04-08 RX ADMIN — Medication 650 MILLIGRAM(S): at 11:28

## 2018-04-08 RX ADMIN — Medication 650 MILLIGRAM(S): at 18:00

## 2018-04-08 RX ADMIN — Medication 1 GRAM(S): at 11:28

## 2018-04-08 RX ADMIN — Medication 1 GRAM(S): at 05:16

## 2018-04-08 RX ADMIN — FAMOTIDINE 20 MILLIGRAM(S): 10 INJECTION INTRAVENOUS at 11:28

## 2018-04-08 NOTE — PROGRESS NOTE ADULT - ASSESSMENT
85 yo F s/p left distal ulnar fracture from fall; in a brace, stable  -take down LUE skin tear dressing tomorrow with ortho present  -ortho requests new xrays  -pain managment  -continue diet  -discussed with attending 85 yo F s/p left distal ulnar fracture from fall; in a brace, stable  -take down LUE skin tear dressing tomorrow 4/9 with ortho present  -ortho requests new xrays  -pain managment  -continue diet  -discussed with attending

## 2018-04-08 NOTE — PROGRESS NOTE ADULT - SUBJECTIVE AND OBJECTIVE BOX
INTERVAL HPI/OVERNIGHT EVENTS: no acute events    SUBJECTIVE:  Pt seen and examined at bedside. Mild pain reported in her left arm. Overall no other complaints. She feels the pain is controlled.  Denies f/c/palpitations, CP, SOB, N/V/D    ROS:  CONSTITUTIONAL: denies fevers, chills  HEENT: Denies headache, blurry vision  RESPIRATORY: Denies cough  CARDIAC: Denies chest pain, racing heart  GASTROINTESTINAL: Denies abdominal pain, constipation, diarrhea  GENITOURINARY: Denies dysuria, hematuria  NEUROLOGIC: Denies headaches, dizziness  MUSCULOSKELETAL: Denies muscle weakness  VASCULAR: Denies claudication and cramping.  ENDOCRINOLOGY: Denies heat or cold intolerance  HEMATOLOGY: Denies easy bleeding or blood transfusion.  DERMATOLOGY: Denies changes in moles or pigmentation changes  PSYCHIATRY: Denies depression, agitation or anxiety        MEDICATIONS  (STANDING):  acetaminophen   Tablet. 650 milliGRAM(s) Oral every 6 hours  ATENolol  Tablet 25 milliGRAM(s) Oral daily  atorvastatin 20 milliGRAM(s) Oral at bedtime  docusate sodium 100 milliGRAM(s) Oral three times a day  famotidine    Tablet 20 milliGRAM(s) Oral daily  FLUoxetine 20 milliGRAM(s) Oral daily  furosemide    Tablet 40 milliGRAM(s) Oral daily  gabapentin 300 milliGRAM(s) Oral three times a day  lidocaine   Patch 2 Patch Transdermal every 24 hours  oxybutynin 5 milliGRAM(s) Oral daily  sucralfate 1 Gram(s) Oral four times a day  tiotropium 18 MICROgram(s) Capsule 1 Capsule(s) Inhalation daily    MEDICATIONS  (PRN):  ondansetron Injectable 4 milliGRAM(s) IV Push every 6 hours PRN Nausea      Vital Signs Last 24 Hrs  T(C): 37.2 (07 Apr 2018 23:45), Max: 37.2 (07 Apr 2018 23:45)  T(F): 98.9 (07 Apr 2018 23:45), Max: 98.9 (07 Apr 2018 23:45)  HR: 65 (07 Apr 2018 23:45) (59 - 78)  BP: 131/69 (07 Apr 2018 23:45) (115/73 - 131/69)  BP(mean): --  RR: 20 (07 Apr 2018 23:45) (18 - 20)  SpO2: 94% (07 Apr 2018 23:45) (94% - 98%)    PE  Gen: NAD  Pulm: CTA b/l  CV: RRR, S1S2  Abd: soft, ND/NT  : no pain  Ext: left arm in brace, with mild tendernessbut pain controlled      I&O's Detail      LABS:    PT/INR - ( 07 Apr 2018 08:39 )   PT: 26.5 sec;   INR: 2.37 ratio         PTT - ( 06 Apr 2018 07:31 )  PTT:41.6 sec      RADIOLOGY & ADDITIONAL STUDIES:

## 2018-04-08 NOTE — PROGRESS NOTE ADULT - SUBJECTIVE AND OBJECTIVE BOX
Ortho Progress note    Name: YOSELYN PAULSON    MR #: [id number]    Pt comfortable without complaints, pain controlled  LUE splinted and elevated  Denies CP, SOB, N/V, numbness/tingling   No complaints    General Exam:  Vital Signs Last 24 Hrs  T(C): 37.2 (04-08-18 @ 08:30), Max: 37.2 (04-08-18 @ 08:30)  T(F): 98.9 (04-08-18 @ 08:30), Max: 98.9 (04-08-18 @ 08:30)  HR: 60 (04-08-18 @ 08:30) (60 - 61)  BP: 118/66 (04-08-18 @ 08:30) (118/66 - 131/72)  BP(mean): --  RR: 18 (04-08-18 @ 08:30) (18 - 18)  SpO2: 98% (04-08-18 @ 08:30) (98% - 98%)    General: Pt Alert and oriented, NAD, controlled pain.  Dressing/ splint C/D/I.   Pulses: Cap refill < 2 sec.  Sensation: Grossly intact to light touch without deficit.  Motor: + digital ROM    A/P: 84y Female with left distal ulna fracture    - Pain Control  - elevation encouraged  - DVT ppx: as prescribed per primary team   - PT   - Weight bearing status: NWB LUE  - Dressing/ splint change tomorrow 4/9/18 by ORTHO  - repeat xrays 4/9/18  - Continue care per primary team

## 2018-04-09 LAB
INR BLD: 2.46 RATIO — HIGH (ref 0.88–1.16)
PROTHROM AB SERPL-ACNC: 27.6 SEC — HIGH (ref 9.8–12.7)

## 2018-04-09 PROCEDURE — 99232 SBSQ HOSP IP/OBS MODERATE 35: CPT

## 2018-04-09 PROCEDURE — 73110 X-RAY EXAM OF WRIST: CPT | Mod: 26,LT

## 2018-04-09 RX ORDER — WARFARIN SODIUM 2.5 MG/1
4 TABLET ORAL ONCE
Qty: 0 | Refills: 0 | Status: COMPLETED | OUTPATIENT
Start: 2018-04-09 | End: 2018-04-09

## 2018-04-09 RX ADMIN — Medication 5 MILLIGRAM(S): at 10:33

## 2018-04-09 RX ADMIN — Medication 1 GRAM(S): at 23:13

## 2018-04-09 RX ADMIN — Medication 1 GRAM(S): at 10:33

## 2018-04-09 RX ADMIN — Medication 1 GRAM(S): at 16:54

## 2018-04-09 RX ADMIN — Medication 650 MILLIGRAM(S): at 01:08

## 2018-04-09 RX ADMIN — Medication 650 MILLIGRAM(S): at 00:38

## 2018-04-09 RX ADMIN — Medication 650 MILLIGRAM(S): at 06:02

## 2018-04-09 RX ADMIN — ATENOLOL 25 MILLIGRAM(S): 25 TABLET ORAL at 05:31

## 2018-04-09 RX ADMIN — Medication 20 MILLIGRAM(S): at 10:33

## 2018-04-09 RX ADMIN — Medication 100 MILLIGRAM(S): at 10:33

## 2018-04-09 RX ADMIN — Medication 100 MILLIGRAM(S): at 05:31

## 2018-04-09 RX ADMIN — Medication 1 GRAM(S): at 05:31

## 2018-04-09 RX ADMIN — TIOTROPIUM BROMIDE 1 CAPSULE(S): 18 CAPSULE ORAL; RESPIRATORY (INHALATION) at 09:22

## 2018-04-09 RX ADMIN — LIDOCAINE 2 PATCH: 4 CREAM TOPICAL at 05:00

## 2018-04-09 RX ADMIN — GABAPENTIN 300 MILLIGRAM(S): 400 CAPSULE ORAL at 14:20

## 2018-04-09 RX ADMIN — Medication 40 MILLIGRAM(S): at 05:31

## 2018-04-09 RX ADMIN — FAMOTIDINE 20 MILLIGRAM(S): 10 INJECTION INTRAVENOUS at 10:32

## 2018-04-09 RX ADMIN — Medication 100 MILLIGRAM(S): at 22:15

## 2018-04-09 RX ADMIN — Medication 650 MILLIGRAM(S): at 16:53

## 2018-04-09 RX ADMIN — ATORVASTATIN CALCIUM 20 MILLIGRAM(S): 80 TABLET, FILM COATED ORAL at 22:13

## 2018-04-09 RX ADMIN — Medication 1 GRAM(S): at 14:54

## 2018-04-09 RX ADMIN — Medication 650 MILLIGRAM(S): at 17:47

## 2018-04-09 RX ADMIN — Medication 650 MILLIGRAM(S): at 14:20

## 2018-04-09 RX ADMIN — GABAPENTIN 300 MILLIGRAM(S): 400 CAPSULE ORAL at 05:31

## 2018-04-09 RX ADMIN — Medication 650 MILLIGRAM(S): at 16:22

## 2018-04-09 RX ADMIN — WARFARIN SODIUM 4 MILLIGRAM(S): 2.5 TABLET ORAL at 22:13

## 2018-04-09 RX ADMIN — GABAPENTIN 300 MILLIGRAM(S): 400 CAPSULE ORAL at 22:15

## 2018-04-09 RX ADMIN — Medication 650 MILLIGRAM(S): at 05:32

## 2018-04-09 NOTE — PROGRESS NOTE ADULT - SUBJECTIVE AND OBJECTIVE BOX
Acute Care Surgery /Trauma PROGRESS NOTE:     SUBJECTIVE: Pt seen and examined at bedside. No acute overnight events. Pain well controlled. Tolerating diet, no n/v. Voiding well. Passing flatus, normal BM. Denies f/c/sob/cp. Not OOB    Vital Signs Last 24 Hrs  T(C): 36.8 (09 Apr 2018 08:20), Max: 37.1 (08 Apr 2018 16:21)  T(F): 98.2 (09 Apr 2018 08:20), Max: 98.8 (08 Apr 2018 16:21)  HR: 59 (09 Apr 2018 08:20) (59 - 65)  BP: 91/57 (09 Apr 2018 08:20) (91/57 - 121/72)  BP(mean): --  RR: 18 (09 Apr 2018 08:20) (18 - 20)  SpO2: 97% (09 Apr 2018 08:20) (94% - 98%)    OBJECTIVE:  NAD  NC/AT  RRR  No respiratory distress  Abd soft, nondistended, nontender, no guarding or rebound. No peritoneal signs.  No pedal edema    I&O's Detail      MEDICATIONS  (STANDING):  acetaminophen   Tablet. 650 milliGRAM(s) Oral every 6 hours  ATENolol  Tablet 25 milliGRAM(s) Oral daily  atorvastatin 20 milliGRAM(s) Oral at bedtime  docusate sodium 100 milliGRAM(s) Oral three times a day  famotidine    Tablet 20 milliGRAM(s) Oral daily  FLUoxetine 20 milliGRAM(s) Oral daily  furosemide    Tablet 40 milliGRAM(s) Oral daily  gabapentin 300 milliGRAM(s) Oral three times a day  lidocaine   Patch 2 Patch Transdermal every 24 hours  oxybutynin 5 milliGRAM(s) Oral daily  sucralfate 1 Gram(s) Oral four times a day  tiotropium 18 MICROgram(s) Capsule 1 Capsule(s) Inhalation daily    MEDICATIONS  (PRN):  ondansetron Injectable 4 milliGRAM(s) IV Push every 6 hours PRN Nausea      LABS:          PT/INR - ( 09 Apr 2018 08:12 )   PT: 27.6 sec;   INR: 2.46 ratio                 RADIOLOGY & ADDITIONAL STUDIES: Acute Care Surgery /Trauma PROGRESS NOTE:     SUBJECTIVE: Pt seen and examined at bedside. No acute overnight events. Pain well controlled. Tolerating diet, no n/v. Voiding well. Passing flatus, normal BM. Denies f/c/sob/cp. Not OOB    Vital Signs Last 24 Hrs  T(C): 36.8 (09 Apr 2018 08:20), Max: 37.1 (08 Apr 2018 16:21)  T(F): 98.2 (09 Apr 2018 08:20), Max: 98.8 (08 Apr 2018 16:21)  HR: 59 (09 Apr 2018 08:20) (59 - 65)  BP: 91/57 (09 Apr 2018 08:20) (91/57 - 121/72)  BP(mean): --  RR: 18 (09 Apr 2018 08:20) (18 - 20)  SpO2: 97% (09 Apr 2018 08:20) (94% - 98%)    OBJECTIVE:  NAD  NC/AT  RRR  No respiratory distress  Abd soft, nondistended, nontender, no guarding or rebound. No peritoneal signs.  RUE motor and sensation intact  No pedal edema    I&O's Detail      MEDICATIONS  (STANDING):  acetaminophen   Tablet. 650 milliGRAM(s) Oral every 6 hours  ATENolol  Tablet 25 milliGRAM(s) Oral daily  atorvastatin 20 milliGRAM(s) Oral at bedtime  docusate sodium 100 milliGRAM(s) Oral three times a day  famotidine    Tablet 20 milliGRAM(s) Oral daily  FLUoxetine 20 milliGRAM(s) Oral daily  furosemide    Tablet 40 milliGRAM(s) Oral daily  gabapentin 300 milliGRAM(s) Oral three times a day  lidocaine   Patch 2 Patch Transdermal every 24 hours  oxybutynin 5 milliGRAM(s) Oral daily  sucralfate 1 Gram(s) Oral four times a day  tiotropium 18 MICROgram(s) Capsule 1 Capsule(s) Inhalation daily    MEDICATIONS  (PRN):  ondansetron Injectable 4 milliGRAM(s) IV Push every 6 hours PRN Nausea      LABS:    PT/INR - ( 09 Apr 2018 08:12 )   PT: 27.6 sec;   INR: 2.46 ratio

## 2018-04-09 NOTE — PHYSICAL THERAPY INITIAL EVALUATION ADULT - CRITERIA FOR SKILLED THERAPEUTIC INTERVENTIONS
functional limitations in following categories/anticipated equipment needs at discharge/rehab potential/risk reduction/prevention/impairments found/therapy frequency/anticipated discharge recommendation

## 2018-04-09 NOTE — PHYSICAL THERAPY INITIAL EVALUATION ADULT - ADDITIONAL COMMENTS
pt states she lives with her daughter, 3 adult grandchildren in a house with a ramped entrance. she has a rollator, cane, handicapped accessible bathroom and doorways. everyone works and pt needs to be independent.

## 2018-04-09 NOTE — PROGRESS NOTE ADULT - SUBJECTIVE AND OBJECTIVE BOX
Patient in bed, comfortable.  Awaiting PT kameronal as she has not been seen by them, nor ambulated.   Understands her functional difficulties. Her balance was already off prior and realizes she will need help.    FUNCTIONAL PROGRESS  4/7  Pt received supine in bed, pt's left arm supported on pillow and + soft cast. Reviewed with pt tendon gliding exercises to do daily to assist with overall ROM in prep for self care tasks, pt also educated again on NWB of LUE. Pt left supine in bed call bell within reach.     REVIEW OF SYSTEMS  Constitutional - No fever,  No fatigue  HEENT - No vertigo, No neck pain  Neurological - No headaches, No memory loss, +loss of strength, No numbness, No tremors  Skin - No rashes, No lesions   Musculoskeletal - +joint pain, No joint swelling, +muscle pain  Psychiatric - No depression, No anxiety    VITALS  T(C): 36.8 (04-09-18 @ 08:20), Max: 37.1 (04-08-18 @ 16:21)  HR: 59 (04-09-18 @ 08:20) (59 - 65)  BP: 91/57 (04-09-18 @ 08:20) (91/57 - 121/72)  RR: 18 (04-09-18 @ 08:20) (18 - 20)  SpO2: 97% (04-09-18 @ 08:20) (94% - 98%)  Wt(kg): --    MEDICATIONS   acetaminophen   Tablet. 650 milliGRAM(s) every 6 hours  ATENolol  Tablet 25 milliGRAM(s) daily  atorvastatin 20 milliGRAM(s) at bedtime  docusate sodium 100 milliGRAM(s) three times a day  famotidine    Tablet 20 milliGRAM(s) daily  FLUoxetine 20 milliGRAM(s) daily  furosemide    Tablet 40 milliGRAM(s) daily  gabapentin 300 milliGRAM(s) three times a day  lidocaine   Patch 2 Patch every 24 hours  ondansetron Injectable 4 milliGRAM(s) every 6 hours PRN  oxybutynin 5 milliGRAM(s) daily  sucralfate 1 Gram(s) four times a day  tiotropium 18 MICROgram(s) Capsule 1 Capsule(s) daily        ---------  PHYSICAL EXAM  Constitutional - NAD, Comfortable  Extremities - No C/C/E, No calf tenderness  Neurologic Exam -                    Cognitive - AAOx3     Communication - Fluent     Motor - Focal deficits limited to the left UE - casted.  3/5. HF 1/5 due to weakness.      Sensory - Intact to LT  Psychiatric - Mood WNL, Affect WNL    ASSESSMENT/PLAN  84y Female with functional deficits after sustaining a left distal ulnar fracture, s/p cast, after a fall    Pain - Tylenol PRN, Neurontin, Lidoderms  DVT PPX - SCDs, Ambulation  Rehab Dispo - Will await eval from PT, but suspect patient will need RADHA. She does not have help at home during the day.

## 2018-04-09 NOTE — PROGRESS NOTE ADULT - SUBJECTIVE AND OBJECTIVE BOX
XRs were reviewed. Fracture is unchanged.    PLAN:  * ortho ready for discharge once medically stable  * NO FURTHER DRESSING CHANGES or SPLINT CHANGES until seen in office

## 2018-04-09 NOTE — PHYSICAL THERAPY INITIAL EVALUATION ADULT - PERTINENT HX OF CURRENT PROBLEM, REHAB EVAL
83 y/o Female with PMHx Afib, PE, DVT on coumadin, s/p IVCF,  HTN, CAD, CHF, PPM, AV node ablation, TIA, COPD, Asthma, GERD, rectal adenoma BIBA s/p fall from standing walking up driveway.  C/O L shoulder and hip pain, no LOC..sustaining left distal ulna fracture s/p closed reduction and splint

## 2018-04-09 NOTE — PHYSICAL THERAPY INITIAL EVALUATION ADULT - RANGE OF MOTION EXAMINATION, REHAB EVAL
Right UE ROM was WFL (within functional limits)/left shoulder WFL. elbow NT due to cast/bilateral lower extremity ROM was WFL (within functional limits)

## 2018-04-09 NOTE — PROGRESS NOTE ADULT - ASSESSMENT
85 yo F s/p left distal ulnar fracture from fall; in a brace, stable  -take down LUE skin tear dressing today 4/9 with ortho present  -ortho requests new xrays  -pain managment  -continue diet  -discussed with attending  -PT/SW/dispo

## 2018-04-09 NOTE — PROGRESS NOTE ADULT - SUBJECTIVE AND OBJECTIVE BOX
Pt Name: YOSELYN PAULSON    MRN: [id number]      Patient is a being followed for a non-operative left distal ulnar fracture. She reports of a recent dressing change. She is pending possible transfer to subacute rehab today.        PAST MEDICAL & SURGICAL HISTORY:  PAST MEDICAL & SURGICAL HISTORY:  Pacemaker  TIA (transient ischemic attack)  DVT (deep venous thrombosis)  Pulmonary embolism  Anemia  GERD (gastroesophageal reflux disease)  CHF (congestive heart failure)  Asthma  COPD (chronic obstructive pulmonary disease)  Atrial fibrillation  Presence of inferior vena cava filter  H/O atrioventricular eva ablation  Age-related cataract of both eyes  Ptosis, both eyelids  Internal hemorrhoid: banded and cauterized  History of total knee arthroplasty, right  Rectal adenoma  History of lumbar laminectomy: L3-4  History of cholecystectomy  H/O repair of right rotator cuff  H/O abdominal hysterectomy      Allergies: codeine (Unknown)  morphine (Unknown)      Medications: acetaminophen   Tablet. 650 milliGRAM(s) Oral every 6 hours  ATENolol  Tablet 25 milliGRAM(s) Oral daily  atorvastatin 20 milliGRAM(s) Oral at bedtime  docusate sodium 100 milliGRAM(s) Oral three times a day  famotidine    Tablet 20 milliGRAM(s) Oral daily  FLUoxetine 20 milliGRAM(s) Oral daily  furosemide    Tablet 40 milliGRAM(s) Oral daily  gabapentin 300 milliGRAM(s) Oral three times a day  lidocaine   Patch 2 Patch Transdermal every 24 hours  ondansetron Injectable 4 milliGRAM(s) IV Push every 6 hours PRN  oxybutynin 5 milliGRAM(s) Oral daily  sucralfate 1 Gram(s) Oral four times a day  tiotropium 18 MICROgram(s) Capsule 1 Capsule(s) Inhalation daily        Ambulation: Walking independently [ ] With Cane [ ] With Walker [x]  Bedbound [ ]               PHYSICAL EXAM:    Vital Signs Last 24 Hrs  T(C): 36.8 (09 Apr 2018 08:20), Max: 37.2 (08 Apr 2018 08:30)  T(F): 98.2 (09 Apr 2018 08:20), Max: 98.9 (08 Apr 2018 08:30)  HR: 59 (09 Apr 2018 08:20) (59 - 65)  BP: 91/57 (09 Apr 2018 08:20) (91/57 - 121/72)  BP(mean): --  RR: 18 (09 Apr 2018 08:20) (18 - 20)  SpO2: 97% (09 Apr 2018 08:20) (94% - 98%)  Daily     Daily     Appearance: Alert, responsive, in no acute distress.    Neurological: Sensation is grossly intact to light touch. 5/5 motor function of all extremities. No focal deficits or weaknesses found.    Skin: + large dorsal forearm skin tear. No wound erythema. No active discharge. No fluctuance. No rash on visible skin. Skin is clean, dry and intact. No bleeding. No abrasions. No ulcerations.    Vascular: 2+ distal pulses. Cap refill < 2 sec. No signs of venous   insufficiency   or stasis. No extremity ulcerations. No cyanosis.    Musculoskeletal:         Left Upper Extremity: + sugar tong splint in good position. + mid and distal forearm tenderness.       A/P:  Pt is a  84y Female with a  non-operative left distal ulnar fracture.    PLAN:   * New Xeroform dressing and sugar tong splint applied to left UE  * New XR ordered  * may weight bear through the elbow using a platform walker  * Non-weight bearing of the left wrist.

## 2018-04-10 LAB
APTT BLD: 41.7 SEC — HIGH (ref 27.5–37.4)
INR BLD: 2.34 RATIO — HIGH (ref 0.88–1.16)
PROTHROM AB SERPL-ACNC: 26.2 SEC — HIGH (ref 9.8–12.7)

## 2018-04-10 PROCEDURE — 99232 SBSQ HOSP IP/OBS MODERATE 35: CPT

## 2018-04-10 RX ORDER — LIDOCAINE 4 G/100G
1 CREAM TOPICAL
Qty: 0 | Refills: 0 | COMMUNITY
Start: 2018-04-10

## 2018-04-10 RX ORDER — DOCUSATE SODIUM 100 MG
1 CAPSULE ORAL
Qty: 0 | Refills: 0 | COMMUNITY
Start: 2018-04-10

## 2018-04-10 RX ORDER — WARFARIN SODIUM 2.5 MG/1
4 TABLET ORAL ONCE
Qty: 0 | Refills: 0 | Status: COMPLETED | OUTPATIENT
Start: 2018-04-10 | End: 2018-04-10

## 2018-04-10 RX ADMIN — GABAPENTIN 300 MILLIGRAM(S): 400 CAPSULE ORAL at 05:47

## 2018-04-10 RX ADMIN — Medication 650 MILLIGRAM(S): at 11:55

## 2018-04-10 RX ADMIN — Medication 20 MILLIGRAM(S): at 11:56

## 2018-04-10 RX ADMIN — WARFARIN SODIUM 4 MILLIGRAM(S): 2.5 TABLET ORAL at 22:39

## 2018-04-10 RX ADMIN — Medication 40 MILLIGRAM(S): at 06:02

## 2018-04-10 RX ADMIN — FAMOTIDINE 20 MILLIGRAM(S): 10 INJECTION INTRAVENOUS at 11:56

## 2018-04-10 RX ADMIN — Medication 650 MILLIGRAM(S): at 05:46

## 2018-04-10 RX ADMIN — Medication 1 GRAM(S): at 16:45

## 2018-04-10 RX ADMIN — ATORVASTATIN CALCIUM 20 MILLIGRAM(S): 80 TABLET, FILM COATED ORAL at 22:38

## 2018-04-10 RX ADMIN — Medication 650 MILLIGRAM(S): at 11:57

## 2018-04-10 RX ADMIN — LIDOCAINE 2 PATCH: 4 CREAM TOPICAL at 16:45

## 2018-04-10 RX ADMIN — Medication 1 GRAM(S): at 05:48

## 2018-04-10 RX ADMIN — Medication 650 MILLIGRAM(S): at 16:45

## 2018-04-10 RX ADMIN — Medication 1 GRAM(S): at 11:56

## 2018-04-10 RX ADMIN — Medication 100 MILLIGRAM(S): at 22:38

## 2018-04-10 RX ADMIN — GABAPENTIN 300 MILLIGRAM(S): 400 CAPSULE ORAL at 22:39

## 2018-04-10 RX ADMIN — GABAPENTIN 300 MILLIGRAM(S): 400 CAPSULE ORAL at 11:55

## 2018-04-10 RX ADMIN — ATENOLOL 25 MILLIGRAM(S): 25 TABLET ORAL at 05:47

## 2018-04-10 RX ADMIN — Medication 650 MILLIGRAM(S): at 06:46

## 2018-04-10 RX ADMIN — Medication 650 MILLIGRAM(S): at 16:46

## 2018-04-10 RX ADMIN — Medication 5 MILLIGRAM(S): at 11:56

## 2018-04-10 RX ADMIN — TIOTROPIUM BROMIDE 1 CAPSULE(S): 18 CAPSULE ORAL; RESPIRATORY (INHALATION) at 10:13

## 2018-04-10 NOTE — PROGRESS NOTE ADULT - ATTENDING COMMENTS
Saw pt and examined.  Left distal ulnar fracture and forearm skin abrasion.  In sugar-tong splint, fits the forearm well, slightly loose, will reinforce today.  Followup in office in one week. If in hospital again, will get xrays of the wrist early next week.
No new issues.  Denies numbness/tingling of left hand.  Splint in place.  Moves digits.  Medically stable for rehab
Patient seen and examined.  NO new issues.   will need VNA for skin abrasion under splint.  PT OT dispo planning.
No new issues.  Feels well.  LUE dressing changed.  INR therapeutic on Coumadin.  Ok for d/c
Agree with above.  The patient is with no complaints of pain.  Left arm dressing is in place, clean, and dry.  Continue with local wound care as per ortho, PT for mobilization.  Will need to assess rehab needs [facility] vs. acute.

## 2018-04-10 NOTE — PROGRESS NOTE ADULT - ASSESSMENT
85 yo F s/p left distal ulnar fracture from fall; splinted, stable  -LUE wound dressing change today  -ortho signed off f/u outpatient  -PRN pain management  -PO diet  -OOB, Ambulate, IS use  -Plan for d/c to rehab today pending insurance authorization per SW.

## 2018-04-10 NOTE — PROGRESS NOTE ADULT - SUBJECTIVE AND OBJECTIVE BOX
Patient is sitting in bed. Reports improved pain.   Worked with therapy     FUNCTIONAL PROGRESS  4/9  Transfer: Sit to Stand:     · Level of [place]	moderate assist (50% patients effort)	  · Physical Assist/Nonphysical Assist	1 person assist; verbal cues	  · Weight-Bearing Restrictions	nonweight-bearing; left UE	  · Assistive Device	left platform RW	    Transfer: Stand to Sit:     · Level of [place]	minimum assist (75% patients effort)	  · Physical Assist/Nonphysical Assist	1 person assist; verbal cues	  · Weight-Bearing Restrictions	nonweight-bearing; left UE	  · Assistive Device	left platform RW	    Sit/Stand Transfer Safety Analysis:     · Transfer Safety Concerns Noted	decreased weight-shifting ability	  · Impairments Contributing to Impaired Transfers	impaired balance; decreased strength	    Gait Skills:     · Level of [place]	minimum assist (75% patients effort)	  · Physical Assist/Nonphysical Assist	1 person assist; verbal cues	  · Assistive Device	left platform RW	  · Gait Distance	25 feet	      REVIEW OF SYSTEMS  Constitutional - No fever,  No fatigue  HEENT - No vertigo, No neck pain  Neurological - No headaches, No memory loss, No loss of strength, No numbness, No tremors  Skin - No rashes, No lesions   Musculoskeletal - No joint pain, No joint swelling, No muscle pain  Psychiatric - No depression, No anxiety    VITALS  T(C): 36.7 (04-10-18 @ 16:04), Max: 36.7 (04-10-18 @ 16:04)  HR: 70 (04-10-18 @ 16:04) (63 - 70)  BP: 109/70 (04-10-18 @ 16:04) (102/60 - 109/70)  RR: 18 (04-10-18 @ 16:04) (18 - 19)  SpO2: 100% (04-10-18 @ 16:04) (100% - 100%)  Wt(kg): --    MEDICATIONS   acetaminophen   Tablet. 650 milliGRAM(s) every 6 hours  ATENolol  Tablet 25 milliGRAM(s) daily  atorvastatin 20 milliGRAM(s) at bedtime  docusate sodium 100 milliGRAM(s) three times a day  famotidine    Tablet 20 milliGRAM(s) daily  FLUoxetine 20 milliGRAM(s) daily  furosemide    Tablet 40 milliGRAM(s) daily  gabapentin 300 milliGRAM(s) three times a day  lidocaine   Patch 2 Patch every 24 hours  ondansetron Injectable 4 milliGRAM(s) every 6 hours PRN  oxybutynin 5 milliGRAM(s) daily  sucralfate 1 Gram(s) four times a day  tiotropium 18 MICROgram(s) Capsule 1 Capsule(s) daily  warfarin 4 milliGRAM(s) once      ---------  PHYSICAL EXAM  Constitutional - NAD, Comfortable  Extremities - No C/C/E, No calf tenderness  Neurologic Exam -                    Cognitive - AAOx3     Communication - Fluent     Motor - Focal deficits limited to the left UE - casted.  3/5. HF 1/5 due to weakness.      Sensory - Intact to LT  Psychiatric - Mood WNL, Affect WNL    ASSESSMENT/PLAN  84y Female with functional deficits after sustaining a left distal ulnar fracture, s/p cast, after a fall    Pain - Tylenol PRN, Neurontin, Lidoderms  DVT PPX - SCDs, Ambulation  Rehab Dispo - Recommend [facility]. Patient making progress, but unable to go home due to lack of assist needed throughout the day.

## 2018-04-10 NOTE — PROGRESS NOTE ADULT - SUBJECTIVE AND OBJECTIVE BOX
INTERVAL HPI/OVERNIGHT EVENTS: No acute events overnight    SUBJECTIVE: Denies pain this AM. Afebrile. Tolerating diet. OOB w/ assistance. Working w/ PT. +Urination. + [place]. +BM. Denies F/C/N/V/CP/SOB.       MEDICATIONS  (STANDING):  acetaminophen   Tablet. 650 milliGRAM(s) Oral every 6 hours  ATENolol  Tablet 25 milliGRAM(s) Oral daily  atorvastatin 20 milliGRAM(s) Oral at bedtime  docusate sodium 100 milliGRAM(s) Oral three times a day  famotidine    Tablet 20 milliGRAM(s) Oral daily  FLUoxetine 20 milliGRAM(s) Oral daily  furosemide    Tablet 40 milliGRAM(s) Oral daily  gabapentin 300 milliGRAM(s) Oral three times a day  lidocaine   Patch 2 Patch Transdermal every 24 hours  oxybutynin 5 milliGRAM(s) Oral daily  sucralfate 1 Gram(s) Oral four times a day  tiotropium 18 MICROgram(s) Capsule 1 Capsule(s) Inhalation daily    MEDICATIONS  (PRN):  ondansetron Injectable 4 milliGRAM(s) IV Push every 6 hours PRN Nausea      Vital Signs Last 24 Hrs  T(C): 36.6 (10 Apr 2018 07:58), Max: 36.7 (09 Apr 2018 15:47)  T(F): 97.8 (10 Apr 2018 07:58), Max: 98.1 (09 Apr 2018 15:47)  HR: 63 (10 Apr 2018 07:58) (59 - 63)  BP: 102/60 (10 Apr 2018 07:58) (92/57 - 102/60)  BP(mean): --  RR: 19 (10 Apr 2018 07:58) (18 - 19)  SpO2: 96% (09 Apr 2018 15:47) (96% - 96%)    PE  Gen: AAO x3, NAD  Pulm: CTAB, Symmetrical chest rise  CV: RRR  Abd: Soft, NT, ND, -R/-G  Ext: LUE wound splinted. Distal finger movement intact, sensation intact. No C/C/E  Vasc: 2+ Radial, DP pulses  Neuro: No focal neurological deficits      I&O's Detail    09 Apr 2018 07:01  -  10 Apr 2018 07:00  --------------------------------------------------------  IN:    Oral Fluid: 400 mL  Total IN: 400 mL    OUT:  Total OUT: 0 mL    Total NET: 400 mL          LABS:          PT/INR - ( 10 Apr 2018 08:33 )   PT: 26.2 sec;   INR: 2.34 ratio         PTT - ( 10 Apr 2018 08:33 )  PTT:41.7 sec

## 2018-04-11 VITALS
OXYGEN SATURATION: 96 % | HEART RATE: 65 BPM | RESPIRATION RATE: 18 BRPM | DIASTOLIC BLOOD PRESSURE: 62 MMHG | SYSTOLIC BLOOD PRESSURE: 108 MMHG | TEMPERATURE: 98 F

## 2018-04-11 LAB
APTT BLD: 42 SEC — HIGH (ref 27.5–37.4)
INR BLD: 2.26 RATIO — HIGH (ref 0.88–1.16)
PROTHROM AB SERPL-ACNC: 25.3 SEC — HIGH (ref 9.8–12.7)

## 2018-04-11 PROCEDURE — 99232 SBSQ HOSP IP/OBS MODERATE 35: CPT

## 2018-04-11 RX ORDER — WARFARIN SODIUM 2.5 MG/1
4 TABLET ORAL ONCE
Qty: 0 | Refills: 0 | Status: DISCONTINUED | OUTPATIENT
Start: 2018-04-11 | End: 2018-04-11

## 2018-04-11 RX ADMIN — FAMOTIDINE 20 MILLIGRAM(S): 10 INJECTION INTRAVENOUS at 11:10

## 2018-04-11 RX ADMIN — Medication 650 MILLIGRAM(S): at 00:26

## 2018-04-11 RX ADMIN — LIDOCAINE 2 PATCH: 4 CREAM TOPICAL at 15:47

## 2018-04-11 RX ADMIN — GABAPENTIN 300 MILLIGRAM(S): 400 CAPSULE ORAL at 05:53

## 2018-04-11 RX ADMIN — TIOTROPIUM BROMIDE 1 CAPSULE(S): 18 CAPSULE ORAL; RESPIRATORY (INHALATION) at 08:26

## 2018-04-11 RX ADMIN — LIDOCAINE 2 PATCH: 4 CREAM TOPICAL at 05:45

## 2018-04-11 RX ADMIN — Medication 650 MILLIGRAM(S): at 07:13

## 2018-04-11 RX ADMIN — Medication 1 GRAM(S): at 05:53

## 2018-04-11 RX ADMIN — Medication 5 MILLIGRAM(S): at 11:10

## 2018-04-11 RX ADMIN — Medication 650 MILLIGRAM(S): at 05:53

## 2018-04-11 RX ADMIN — Medication 650 MILLIGRAM(S): at 00:56

## 2018-04-11 RX ADMIN — Medication 1 GRAM(S): at 11:10

## 2018-04-11 RX ADMIN — Medication 1 GRAM(S): at 00:26

## 2018-04-11 RX ADMIN — GABAPENTIN 300 MILLIGRAM(S): 400 CAPSULE ORAL at 11:11

## 2018-04-11 RX ADMIN — ATENOLOL 25 MILLIGRAM(S): 25 TABLET ORAL at 05:53

## 2018-04-11 RX ADMIN — Medication 40 MILLIGRAM(S): at 05:53

## 2018-04-11 RX ADMIN — Medication 20 MILLIGRAM(S): at 11:10

## 2018-04-11 RX ADMIN — Medication 100 MILLIGRAM(S): at 05:53

## 2018-04-11 NOTE — PROGRESS NOTE ADULT - SUBJECTIVE AND OBJECTIVE BOX
Patient was not mobilized yesterday. Stayed in bed, sitting all day.  Swelling in the left hand continues, but no pain or numbness.  She is trying to stay active and do some exercises, as well as keep her left UE elevated.  Patient awaiting [facility] placement.     FUNCTIONAL PROGRESS  Min-Mod A    REVIEW OF SYSTEMS  Constitutional - No fever,  +fatigue  HEENT - No vertigo, No neck pain  Neurological - No headaches, No memory loss, +loss of strength, No numbness, No tremors  Skin - No rashes, No lesions   Musculoskeletal - +joint pain, +joint swelling, No muscle pain  Psychiatric - +depression, No anxiety    VITALS  T(C): 36.9 (04-11-18 @ 08:08), Max: 37.2 (04-10-18 @ 23:20)  HR: 63 (04-11-18 @ 08:08) (60 - 70)  BP: 102/62 (04-11-18 @ 08:08) (102/62 - 109/70)  RR: 19 (04-11-18 @ 08:08) (18 - 19)  SpO2: 98% (04-10-18 @ 23:20) (98% - 100%)  Wt(kg): --    MEDICATIONS   acetaminophen   Tablet. 650 milliGRAM(s) every 6 hours  ATENolol  Tablet 25 milliGRAM(s) daily  atorvastatin 20 milliGRAM(s) at bedtime  docusate sodium 100 milliGRAM(s) three times a day  famotidine    Tablet 20 milliGRAM(s) daily  FLUoxetine 20 milliGRAM(s) daily  furosemide    Tablet 40 milliGRAM(s) daily  gabapentin 300 milliGRAM(s) three times a day  lidocaine   Patch 2 Patch every 24 hours  ondansetron Injectable 4 milliGRAM(s) every 6 hours PRN  oxybutynin 5 milliGRAM(s) daily  sucralfate 1 Gram(s) four times a day  tiotropium 18 MICROgram(s) Capsule 1 Capsule(s) daily  warfarin 4 milliGRAM(s) once      ---------  PHYSICAL EXAM  Constitutional - NAD, Comfortable  Extremities - No C/C/E, No calf tenderness  Neurologic Exam -                    Cognitive - AAOx3     Communication - Fluent     Motor - Focal deficits limited to the left UE - casted.  3/5. BLE HF 3/5       Sensory - Intact to LT  Psychiatric - Mood down, but tries to stay positive and active, Affect WNL    ASSESSMENT/PLAN  84y Female with functional deficits after sustaining a left distal ulnar fracture, s/p cast, after a fall    Pain - Tylenol PRN, Neurontin, Lidoderms  DVT PPX - SCDs, Ambulation  Rehab Dispo - Will attempt to have PT see her to ambulate more today. Patient unable to with staff as she needs platform RW. Continue to recommend [facility]. Patient making progress, but unable to go home due to lack of assist needed throughout the day.

## 2018-04-11 NOTE — PROGRESS NOTE ADULT - PROVIDER SPECIALTY LIST ADULT
Orthopedics
Rehab Medicine
Surgery
Trauma Surgery
Orthopedics

## 2018-04-12 RX ORDER — OMEPRAZOLE 10 MG/1
20 CAPSULE, DELAYED RELEASE ORAL
Qty: 0 | Refills: 0 | COMMUNITY

## 2018-04-16 ENCOUNTER — APPOINTMENT (OUTPATIENT)
Dept: UROGYNECOLOGY | Facility: CLINIC | Age: 83
End: 2018-04-16

## 2018-04-17 PROBLEM — D64.9 ANEMIA, UNSPECIFIED: Chronic | Status: ACTIVE | Noted: 2018-04-04

## 2018-04-17 PROBLEM — J45.909 UNSPECIFIED ASTHMA, UNCOMPLICATED: Chronic | Status: ACTIVE | Noted: 2018-04-04

## 2018-04-19 ENCOUNTER — APPOINTMENT (OUTPATIENT)
Dept: ORTHOPEDIC SURGERY | Facility: CLINIC | Age: 83
End: 2018-04-19
Payer: MEDICARE

## 2018-04-19 VITALS
DIASTOLIC BLOOD PRESSURE: 68 MMHG | HEIGHT: 68 IN | WEIGHT: 245 LBS | BODY MASS INDEX: 37.13 KG/M2 | SYSTOLIC BLOOD PRESSURE: 117 MMHG | HEART RATE: 61 BPM

## 2018-04-19 DIAGNOSIS — Z86.79 PERSONAL HISTORY OF OTHER DISEASES OF THE CIRCULATORY SYSTEM: ICD-10-CM

## 2018-04-19 DIAGNOSIS — Z85.9 PERSONAL HISTORY OF MALIGNANT NEOPLASM, UNSPECIFIED: ICD-10-CM

## 2018-04-19 DIAGNOSIS — M25.532 PAIN IN LEFT WRIST: ICD-10-CM

## 2018-04-19 DIAGNOSIS — Z87.09 PERSONAL HISTORY OF OTHER DISEASES OF THE RESPIRATORY SYSTEM: ICD-10-CM

## 2018-04-19 DIAGNOSIS — Z87.39 PERSONAL HISTORY OF OTHER DISEASES OF THE MUSCULOSKELETAL SYSTEM AND CONNECTIVE TISSUE: ICD-10-CM

## 2018-04-19 PROCEDURE — 99204 OFFICE O/P NEW MOD 45 MIN: CPT | Mod: 57

## 2018-04-19 PROCEDURE — 25650 CLTX ULNAR STYLOID FRACTURE: CPT | Mod: LT

## 2018-04-19 PROCEDURE — 29075 APPL CST ELBW FNGR SHORT ARM: CPT | Mod: LT

## 2018-04-19 PROCEDURE — 73110 X-RAY EXAM OF WRIST: CPT | Mod: LT

## 2018-04-27 PROCEDURE — 82803 BLOOD GASES ANY COMBINATION: CPT

## 2018-04-27 PROCEDURE — 80053 COMPREHEN METABOLIC PANEL: CPT

## 2018-04-27 PROCEDURE — 86850 RBC ANTIBODY SCREEN: CPT

## 2018-04-27 PROCEDURE — 99285 EMERGENCY DEPT VISIT HI MDM: CPT | Mod: 25

## 2018-04-27 PROCEDURE — 85610 PROTHROMBIN TIME: CPT

## 2018-04-27 PROCEDURE — 85014 HEMATOCRIT: CPT

## 2018-04-27 PROCEDURE — 82330 ASSAY OF CALCIUM: CPT

## 2018-04-27 PROCEDURE — 86900 BLOOD TYPING SEROLOGIC ABO: CPT

## 2018-04-27 PROCEDURE — 73070 X-RAY EXAM OF ELBOW: CPT

## 2018-04-27 PROCEDURE — 97167 OT EVAL HIGH COMPLEX 60 MIN: CPT

## 2018-04-27 PROCEDURE — 71045 X-RAY EXAM CHEST 1 VIEW: CPT

## 2018-04-27 PROCEDURE — 72170 X-RAY EXAM OF PELVIS: CPT

## 2018-04-27 PROCEDURE — 81001 URINALYSIS AUTO W/SCOPE: CPT

## 2018-04-27 PROCEDURE — 73110 X-RAY EXAM OF WRIST: CPT

## 2018-04-27 PROCEDURE — 94640 AIRWAY INHALATION TREATMENT: CPT

## 2018-04-27 PROCEDURE — 97116 GAIT TRAINING THERAPY: CPT

## 2018-04-27 PROCEDURE — 97163 PT EVAL HIGH COMPLEX 45 MIN: CPT

## 2018-04-27 PROCEDURE — 84100 ASSAY OF PHOSPHORUS: CPT

## 2018-04-27 PROCEDURE — 84295 ASSAY OF SERUM SODIUM: CPT

## 2018-04-27 PROCEDURE — 72125 CT NECK SPINE W/O DYE: CPT

## 2018-04-27 PROCEDURE — 73030 X-RAY EXAM OF SHOULDER: CPT

## 2018-04-27 PROCEDURE — 82435 ASSAY OF BLOOD CHLORIDE: CPT

## 2018-04-27 PROCEDURE — 73090 X-RAY EXAM OF FOREARM: CPT

## 2018-04-27 PROCEDURE — 73060 X-RAY EXAM OF HUMERUS: CPT

## 2018-04-27 PROCEDURE — 85730 THROMBOPLASTIN TIME PARTIAL: CPT

## 2018-04-27 PROCEDURE — 84132 ASSAY OF SERUM POTASSIUM: CPT

## 2018-04-27 PROCEDURE — 83735 ASSAY OF MAGNESIUM: CPT

## 2018-04-27 PROCEDURE — 70450 CT HEAD/BRAIN W/O DYE: CPT

## 2018-04-27 PROCEDURE — 82947 ASSAY GLUCOSE BLOOD QUANT: CPT

## 2018-04-27 PROCEDURE — 80048 BASIC METABOLIC PNL TOTAL CA: CPT

## 2018-04-27 PROCEDURE — 36415 COLL VENOUS BLD VENIPUNCTURE: CPT

## 2018-04-27 PROCEDURE — 97110 THERAPEUTIC EXERCISES: CPT

## 2018-04-27 PROCEDURE — 86901 BLOOD TYPING SEROLOGIC RH(D): CPT

## 2018-04-27 PROCEDURE — 83605 ASSAY OF LACTIC ACID: CPT

## 2018-04-27 PROCEDURE — 85027 COMPLETE CBC AUTOMATED: CPT

## 2018-05-03 ENCOUNTER — APPOINTMENT (OUTPATIENT)
Dept: PULMONOLOGY | Facility: CLINIC | Age: 83
End: 2018-05-03
Payer: MEDICARE

## 2018-05-03 VITALS
HEART RATE: 74 BPM | WEIGHT: 250 LBS | DIASTOLIC BLOOD PRESSURE: 70 MMHG | HEIGHT: 68 IN | SYSTOLIC BLOOD PRESSURE: 130 MMHG | BODY MASS INDEX: 37.89 KG/M2 | OXYGEN SATURATION: 98 %

## 2018-05-03 DIAGNOSIS — R05 COUGH: ICD-10-CM

## 2018-05-03 PROCEDURE — 99215 OFFICE O/P EST HI 40 MIN: CPT

## 2018-05-08 ENCOUNTER — APPOINTMENT (OUTPATIENT)
Dept: ORTHOPEDIC SURGERY | Facility: CLINIC | Age: 83
End: 2018-05-08
Payer: MEDICARE

## 2018-05-08 VITALS
DIASTOLIC BLOOD PRESSURE: 60 MMHG | BODY MASS INDEX: 37.89 KG/M2 | HEIGHT: 68 IN | WEIGHT: 250 LBS | HEART RATE: 60 BPM | SYSTOLIC BLOOD PRESSURE: 102 MMHG

## 2018-05-08 PROCEDURE — 99213 OFFICE O/P EST LOW 20 MIN: CPT

## 2018-05-08 PROCEDURE — 73110 X-RAY EXAM OF WRIST: CPT | Mod: LT

## 2018-05-21 ENCOUNTER — APPOINTMENT (OUTPATIENT)
Dept: ORTHOPEDIC SURGERY | Facility: CLINIC | Age: 83
End: 2018-05-21

## 2018-06-07 ENCOUNTER — APPOINTMENT (OUTPATIENT)
Dept: ORTHOPEDIC SURGERY | Facility: CLINIC | Age: 83
End: 2018-06-07
Payer: MEDICARE

## 2018-06-07 VITALS
SYSTOLIC BLOOD PRESSURE: 110 MMHG | WEIGHT: 250 LBS | HEART RATE: 66 BPM | DIASTOLIC BLOOD PRESSURE: 56 MMHG | HEIGHT: 68 IN | BODY MASS INDEX: 37.89 KG/M2

## 2018-06-07 DIAGNOSIS — S52.622D: ICD-10-CM

## 2018-06-07 PROCEDURE — 73110 X-RAY EXAM OF WRIST: CPT | Mod: LT

## 2018-06-07 PROCEDURE — 99024 POSTOP FOLLOW-UP VISIT: CPT

## 2018-06-11 NOTE — PHYSICAL THERAPY INITIAL EVALUATION ADULT - WEIGHT-BEARING RESTRICTIONS: SIT/STAND, REHAB EVAL
OUTPATIENT PROGRESS NOTE    DATE OF SERVICE:  06/11/18    PROBLEM LIST:   1. Ankle fracture  2. Malignant neoplasm - colon, S/p partial colectomy   3. S/p 12 cycles of treatment with Folfox-6 chemotherapy completing his last cycle on 06/13/16.  4. PET scan done 3/23/17 with evidence of ciara recurrence. Initiated FOLFIRI + Avastin 3/29/17.   5.        June 20, 2017, Excision of mesenteric mass revealing metastatic poorly differentiated carcinoma within the fatty tissue and one lymph node involved w/ large vessel vascular invasion, and extensive perineural invasion. CT AP 7/18/2017 showing previously noted small non-pathologically enlarged lymph node stable. Patient received second and third opinions [facility] and wanted to proceed with Xeloda rather than 5FU, starting 8/14/2017. Completed RT on 09/20/17. Placed on FOLFIRI + Avastin on 10/09/17 and completed on 01/02/18.  6.  CT CAP on 3/12/18 showed mesenteric nodularity consistent with metastatic disease with wall thickening and nodulatiry to the terminal ileum and cecum, ? Serosal metastasis in addition to a right upper lung lobe nodule.   7.  Needle biopsy pathology revealed metastatic adenocarcinoma, histologically similar to the previously diagnosed sigmoid colon adenocarcinoma.   8.  Patient was seen for second opinions at [facility] and [facility].  Note from [facility] is pending.     CHIEF COMPLAINT:    Follow up for colon cancer     SYMPTOMS:  Patient presents today, accompanied by his wife, for a follow up and possible treatment. His weekend was enjoyable. Spent time with his family planting their garden. He has been feeling well. He does note his energy level has been affected, but the most annoying symptom is bouts of indigestion and diarrhea. This symptom has been intermittent and resolves rather quickly; otherwise patient's bowels remain stable. Per patient, and his wife, they are unable to identify if these  symptoms are related to a specific food. Patient informs that he has not taken any pain medication in about 2 weeks. He also relates that the previous round of treatment \"felt the best\" compared to previous.      REVIEW OF SYSTEMS:   Detailed 10 point review of systems was performed which was negative other than the pertinent positives as discussed above.    ECOG=0.    ALLERGIES:  No Known Allergies    Current Outpatient Prescriptions   Medication Sig Dispense Refill   • hydroCORTisone (CORTIZONE) 1 % cream Apply topically to face, back and chest at bedtime daily for 6 weeks. May be discontinued after 6 weeks if no rash appears. 28 g 2   • Turmeric 500 MG Tab Take 1 tablet by mouth daily.     • Cholecalciferol (VITAMIN D3) 5000 UNITS capsule Take 1 capsule daily     • Multiple Vitamin (MULTI VITAMIN DAILY) Tab Take 1 tablet by mouth daily.     • prochlorperazine (COMPAZINE) 10 MG tablet Take 1 tablet by mouth every 6 hours as needed for Nausea or Vomiting (related to chemotherapy). 30 tablet 3   • ondansetron (ZOFRAN ODT) 8 MG disintegrating tablet Place 1 tablet onto the tongue every 8 hours as needed for Nausea (nausea and vomiting related to chemotherapy). 30 tablet 3   • dronabinol (MARINOL) 2.5 MG capsule Take 1 capsule by mouth 2 times daily (before meals). 60 capsule 2   • oxyCODONE, IMM REL, (ROXICODONE) 5 MG immediate release tablet Take 1 tablet by mouth every 4 hours as needed for Pain. 180 tablet 0   • doxycycline hyclate (VIBRAMYCIN) 100 MG capsule Take one capsule by mouth two times daily. Take for 6 weeks. May be discontinued after 6 weeks if no rash appears. 60 capsule 2   • docusate sodium-sennosides (SENOKOT S) 50-8.6 MG per tablet Take 1 tablet by mouth daily. 60 tablet 11   • loperamide (IMODIUM) 2 MG capsule Take 1 capsule by mouth 4 times daily as needed for Diarrhea (prn diarrhea). 30 capsule 0     No current facility-administered medications for this visit.      PHYSICAL EXAM:  GENERAL: The  patient is sitting up in chair in no apparent distress.   Vitals:    06/11/18 1009   BP: 120/79   Pulse: 87   Resp: 16   Temp: 98.3 °F (36.8 °C)   TempSrc: Oral   SpO2: 98%   Weight: 81.1 kg   Height: 5' 9\" (1.753 m)   PainSc:  0   EYES: ABRAHAN, EOMI. Conjunctival pallor not present. Sclerae: No icterus.   ENT: No hearing deficit. Tongue: No masses. Uvula in midline. Tonsils:   Unremarkable.   NECK: No thyromegaly.   LYMPHATIC: No lymphadenopathy in neck, axillary or inguinal areas.   PULMONARY: Clear vesicular breath sounds, no wheezing or crackles. No dullness on percussion.   CARDIOVASCULAR: S1, S2 heard, regular rate and rhythm. No mechanical   sounds or murmurs noted. Apical pulse normal.   ABDOMEN: Soft, non-tender. No masses are palpable. No liver/spleen palpable. BSs- 2 to 3 per minute. No ascites noted.  EXTREMITIES: Bilateral lower extremity no edema is noted.   SKIN: No ulceration. No unusual masses. No unusual pigmentation.   CNS: Alert and oriented x 3. Grossly non-focal.   PSYCHIATRIC: Normal affect.    LABORATORY DATA:  Reviewed and confirmed in the EMR.    ASSESSMENT AND PLAN:  1. 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 PET scan of 3/23/17 showed recurrent disease in an isolated LN in the mesenteric root s/p excision on 06/20/17 with tumor showing extension to soft tissue. Is KRAS WT. Patient initiated cycle 1 day 1 of FOLFIRI + Avastin on 3/29/17 with plan for 6 cycles followed by restaging. CT CAP done after Cycle 4 of treatment showed decrease in the size of the solitary site of recurrence. No new lesions. Pt completed cycle 6 of FOLFIRI f/ued by surgical excision of mesenteric mass which revealed metastatic poorly differentiated carcinoma within the fatty tissue and within 1 lymph node and large vessel vascular invasion and extensive perineural invasion. Pt case was discussed at the tumor board with recommendation of concurrent chemoradiation followed by additional  3 months of FOLFIRI + Avastin. Per patient request, decision was made to proceed with Xeloda 1500 mg po bid M-F vs 5FU, beginning 8/14/17. Completed 09/20/17 with placement on additional 3 months of FOLFIRI + Avastin on 10/09/17 and completed on 01/02/18. CT scan done 12/28/17 showed LUANNE. CT CAP on 3/12/18 showed mesenteric nodularity consistent with metastatic disease with wall thickening and nodulatiry to the terminal ileum and cecum, ? Serosal metastasis. Patient was also found to have a right upper lung lobe nodule. Needle biopsy pathology from 3/16/18 revealed metastatic adenocarcinoma, histologically similar to the previously diagnosed sigmoid colon adenocarcinoma. Foundation One testing returned on patient's disease.   Available laboratory findings reviewed with the patient. Patient's platelets were at 51,000 on 5/25. We will redraw today, and to determine efficacy of proceeding with treatment. If amenable to proceed with treatment, FOLFOX-6 + Vectibix today, Cycle 5 Day 1. Patient is receiving Neulasta support. PET from 5/25 shows 1) Low-grade FDG uptake within the biopsy-proven omental metastases in the upper abdomen. 2) Moderate volume ascites, increased since the prior studies. The nodules noted previously on his lungs have remained stable. I further discussed with the patient and his wife the full plan of care, including patient's following with [facility]. Patient will return in 2 weeks, with a CT scan.   2. Abdominal discomfort, secondary to disease metastasis. Has been managed with 3-4 oxycodone a week. Patient states he has not taken pain medication in roughly 2 weeks.   3. Diarrhea/Indigestion. Likely due to treatment. Occurring intermittently, lasting 3-6 hours. Tolerated.  4. Neutropenia secondary to treatment. Improved with Neulasta support.   5. Family history of colon cancer. Genetic counseling was completed and was found to have mutation of unknown significance.   6. Weight loss secondary to  treatment. Currently taking Marinol. I further advised maintaining a MVI regimen as well as a high protein diet. Advised to drink an extra bottle of Gatorade to help prevent dehydration.   7. Dry Skin, advised use of Vaseline moisturizing lotion with aloe vera.   8. Neuropathy, improving on Vitamin B Complex.      The above was discussed with the patient at length. He exhibits complete understanding of the above discussion and agrees to proceed with the plan of care.      Neymar Malone MD    This chart was documented by Garrison Garcia, acting as a scribe for Neymar Malone MD. 5/29/2018, 9:59 AM.      The documentation recorded by the scribe accurately and completely reflects the service(s) I personally performed and the decisions made by me.   Neymar Malone MD             left UE/nonweight-bearing

## 2018-07-01 DIAGNOSIS — M25.562 PAIN IN LEFT KNEE: ICD-10-CM

## 2018-07-01 DIAGNOSIS — M25.552 PAIN IN LEFT HIP: ICD-10-CM

## 2018-07-05 PROBLEM — M25.562 LEFT KNEE PAIN: Status: ACTIVE | Noted: 2018-07-05

## 2018-07-05 PROBLEM — M25.552 LEFT HIP PAIN: Status: ACTIVE | Noted: 2018-07-05

## 2018-07-13 ENCOUNTER — APPOINTMENT (OUTPATIENT)
Dept: ORTHOPEDIC SURGERY | Facility: CLINIC | Age: 83
End: 2018-07-13
Payer: MEDICARE

## 2018-07-13 VITALS
DIASTOLIC BLOOD PRESSURE: 71 MMHG | HEIGHT: 68 IN | BODY MASS INDEX: 37.89 KG/M2 | WEIGHT: 250 LBS | SYSTOLIC BLOOD PRESSURE: 115 MMHG | HEART RATE: 61 BPM

## 2018-07-13 DIAGNOSIS — M16.12 UNILATERAL PRIMARY OSTEOARTHRITIS, LEFT HIP: ICD-10-CM

## 2018-07-13 PROCEDURE — 73502 X-RAY EXAM HIP UNI 2-3 VIEWS: CPT | Mod: LT

## 2018-07-13 PROCEDURE — 73562 X-RAY EXAM OF KNEE 3: CPT | Mod: LT

## 2018-07-13 PROCEDURE — 99214 OFFICE O/P EST MOD 30 MIN: CPT | Mod: 24

## 2018-08-10 ENCOUNTER — APPOINTMENT (OUTPATIENT)
Dept: ORTHOPEDIC SURGERY | Facility: CLINIC | Age: 83
End: 2018-08-10
Payer: MEDICARE

## 2018-08-10 ENCOUNTER — APPOINTMENT (OUTPATIENT)
Dept: PULMONOLOGY | Facility: CLINIC | Age: 83
End: 2018-08-10
Payer: MEDICARE

## 2018-08-10 VITALS — HEART RATE: 87 BPM | OXYGEN SATURATION: 95 %

## 2018-08-10 VITALS
WEIGHT: 250 LBS | DIASTOLIC BLOOD PRESSURE: 62 MMHG | SYSTOLIC BLOOD PRESSURE: 104 MMHG | HEART RATE: 71 BPM | HEIGHT: 68 IN | BODY MASS INDEX: 37.89 KG/M2

## 2018-08-10 DIAGNOSIS — M17.12 UNILATERAL PRIMARY OSTEOARTHRITIS, LEFT KNEE: ICD-10-CM

## 2018-08-10 PROCEDURE — 99214 OFFICE O/P EST MOD 30 MIN: CPT

## 2018-08-10 PROCEDURE — 94060 EVALUATION OF WHEEZING: CPT

## 2018-08-10 PROCEDURE — 99215 OFFICE O/P EST HI 40 MIN: CPT | Mod: 25

## 2018-08-10 PROCEDURE — 94664 DEMO&/EVAL PT USE INHALER: CPT | Mod: 59

## 2018-08-17 ENCOUNTER — OTHER (OUTPATIENT)
Age: 83
End: 2018-08-17

## 2018-09-26 ENCOUNTER — FORM ENCOUNTER (OUTPATIENT)
Age: 83
End: 2018-09-26

## 2018-09-27 ENCOUNTER — OUTPATIENT (OUTPATIENT)
Dept: OUTPATIENT SERVICES | Facility: HOSPITAL | Age: 83
LOS: 1 days | End: 2018-09-27
Payer: MEDICARE

## 2018-09-27 VITALS
DIASTOLIC BLOOD PRESSURE: 81 MMHG | HEART RATE: 62 BPM | HEIGHT: 68 IN | RESPIRATION RATE: 18 BRPM | SYSTOLIC BLOOD PRESSURE: 150 MMHG | TEMPERATURE: 98 F | WEIGHT: 244.71 LBS

## 2018-09-27 DIAGNOSIS — I48.91 UNSPECIFIED ATRIAL FIBRILLATION: ICD-10-CM

## 2018-09-27 DIAGNOSIS — Z90.49 ACQUIRED ABSENCE OF OTHER SPECIFIED PARTS OF DIGESTIVE TRACT: Chronic | ICD-10-CM

## 2018-09-27 DIAGNOSIS — Z90.710 ACQUIRED ABSENCE OF BOTH CERVIX AND UTERUS: Chronic | ICD-10-CM

## 2018-09-27 DIAGNOSIS — H25.9 UNSPECIFIED AGE-RELATED CATARACT: Chronic | ICD-10-CM

## 2018-09-27 DIAGNOSIS — G56.00 CARPAL TUNNEL SYNDROME, UNSPECIFIED UPPER LIMB: Chronic | ICD-10-CM

## 2018-09-27 DIAGNOSIS — J44.9 CHRONIC OBSTRUCTIVE PULMONARY DISEASE, UNSPECIFIED: ICD-10-CM

## 2018-09-27 DIAGNOSIS — I10 ESSENTIAL (PRIMARY) HYPERTENSION: ICD-10-CM

## 2018-09-27 DIAGNOSIS — Z95.0 PRESENCE OF CARDIAC PACEMAKER: Chronic | ICD-10-CM

## 2018-09-27 DIAGNOSIS — Z98.890 OTHER SPECIFIED POSTPROCEDURAL STATES: Chronic | ICD-10-CM

## 2018-09-27 DIAGNOSIS — Z98.89 OTHER SPECIFIED POSTPROCEDURAL STATES: Chronic | ICD-10-CM

## 2018-09-27 DIAGNOSIS — D12.6 BENIGN NEOPLASM OF COLON, UNSPECIFIED: Chronic | ICD-10-CM

## 2018-09-27 DIAGNOSIS — I99.8 OTHER DISORDER OF CIRCULATORY SYSTEM: ICD-10-CM

## 2018-09-27 DIAGNOSIS — Z29.9 ENCOUNTER FOR PROPHYLACTIC MEASURES, UNSPECIFIED: ICD-10-CM

## 2018-09-27 DIAGNOSIS — M17.12 UNILATERAL PRIMARY OSTEOARTHRITIS, LEFT KNEE: ICD-10-CM

## 2018-09-27 DIAGNOSIS — H02.403 UNSPECIFIED PTOSIS OF BILATERAL EYELIDS: Chronic | ICD-10-CM

## 2018-09-27 DIAGNOSIS — H02.409 UNSPECIFIED PTOSIS OF UNSPECIFIED EYELID: Chronic | ICD-10-CM

## 2018-09-27 DIAGNOSIS — Z01.818 ENCOUNTER FOR OTHER PREPROCEDURAL EXAMINATION: ICD-10-CM

## 2018-09-27 DIAGNOSIS — D12.8 BENIGN NEOPLASM OF RECTUM: Chronic | ICD-10-CM

## 2018-09-27 DIAGNOSIS — Z98.41 CATARACT EXTRACTION STATUS, RIGHT EYE: Chronic | ICD-10-CM

## 2018-09-27 DIAGNOSIS — I50.9 HEART FAILURE, UNSPECIFIED: ICD-10-CM

## 2018-09-27 DIAGNOSIS — Z95.828 PRESENCE OF OTHER VASCULAR IMPLANTS AND GRAFTS: Chronic | ICD-10-CM

## 2018-09-27 DIAGNOSIS — K64.8 OTHER HEMORRHOIDS: Chronic | ICD-10-CM

## 2018-09-27 DIAGNOSIS — M75.121 COMPLETE ROTATOR CUFF TEAR OR RUPTURE OF RIGHT SHOULDER, NOT SPECIFIED AS TRAUMATIC: Chronic | ICD-10-CM

## 2018-09-27 DIAGNOSIS — Z96.651 PRESENCE OF RIGHT ARTIFICIAL KNEE JOINT: Chronic | ICD-10-CM

## 2018-09-27 DIAGNOSIS — Z98.42 CATARACT EXTRACTION STATUS, LEFT EYE: Chronic | ICD-10-CM

## 2018-09-27 LAB
ANION GAP SERPL CALC-SCNC: 11 MMOL/L — SIGNIFICANT CHANGE UP (ref 5–17)
ANISOCYTOSIS BLD QL: SLIGHT — SIGNIFICANT CHANGE UP
APTT BLD: 45.6 SEC — HIGH (ref 27.5–37.4)
BASOPHILS # BLD AUTO: 0 K/UL — SIGNIFICANT CHANGE UP (ref 0–0.2)
BASOPHILS NFR BLD AUTO: 0.3 % — SIGNIFICANT CHANGE UP (ref 0–2)
BLD GP AB SCN SERPL QL: SIGNIFICANT CHANGE UP
BUN SERPL-MCNC: 17 MG/DL — SIGNIFICANT CHANGE UP (ref 8–20)
CALCIUM SERPL-MCNC: 9.2 MG/DL — SIGNIFICANT CHANGE UP (ref 8.6–10.2)
CHLORIDE SERPL-SCNC: 101 MMOL/L — SIGNIFICANT CHANGE UP (ref 98–107)
CO2 SERPL-SCNC: 29 MMOL/L — SIGNIFICANT CHANGE UP (ref 22–29)
CREAT SERPL-MCNC: 0.91 MG/DL — SIGNIFICANT CHANGE UP (ref 0.5–1.3)
EOSINOPHIL # BLD AUTO: 0.1 K/UL — SIGNIFICANT CHANGE UP (ref 0–0.5)
EOSINOPHIL NFR BLD AUTO: 2 % — SIGNIFICANT CHANGE UP (ref 0–6)
GLUCOSE SERPL-MCNC: 89 MG/DL — SIGNIFICANT CHANGE UP (ref 70–115)
HBA1C BLD-MCNC: 6.2 % — HIGH (ref 4–5.6)
HCT VFR BLD CALC: 33.2 % — LOW (ref 37–47)
HGB BLD-MCNC: 10.1 G/DL — LOW (ref 12–16)
HYPOCHROMIA BLD QL: SLIGHT — SIGNIFICANT CHANGE UP
INR BLD: 3.07 RATIO — HIGH (ref 0.88–1.16)
LYMPHOCYTES # BLD AUTO: 1 K/UL — SIGNIFICANT CHANGE UP (ref 1–4.8)
LYMPHOCYTES # BLD AUTO: 28.7 % — SIGNIFICANT CHANGE UP (ref 20–55)
MACROCYTES BLD QL: SLIGHT — SIGNIFICANT CHANGE UP
MCHC RBC-ENTMCNC: 30.4 G/DL — LOW (ref 32–36)
MCHC RBC-ENTMCNC: 31.7 PG — HIGH (ref 27–31)
MCV RBC AUTO: 104.1 FL — HIGH (ref 81–99)
MONOCYTES # BLD AUTO: 0.4 K/UL — SIGNIFICANT CHANGE UP (ref 0–0.8)
MONOCYTES NFR BLD AUTO: 10.9 % — HIGH (ref 3–10)
MRSA PCR RESULT.: DETECTED
NEUTROPHILS # BLD AUTO: 2 K/UL — SIGNIFICANT CHANGE UP (ref 1.8–8)
NEUTROPHILS NFR BLD AUTO: 57.8 % — SIGNIFICANT CHANGE UP (ref 37–73)
OVALOCYTES BLD QL SMEAR: SLIGHT — SIGNIFICANT CHANGE UP
PLAT MORPH BLD: NORMAL — SIGNIFICANT CHANGE UP
PLATELET # BLD AUTO: 177 K/UL — SIGNIFICANT CHANGE UP (ref 150–400)
POIKILOCYTOSIS BLD QL AUTO: SLIGHT — SIGNIFICANT CHANGE UP
POTASSIUM SERPL-MCNC: 4.3 MMOL/L — SIGNIFICANT CHANGE UP (ref 3.5–5.3)
POTASSIUM SERPL-SCNC: 4.3 MMOL/L — SIGNIFICANT CHANGE UP (ref 3.5–5.3)
PROTHROM AB SERPL-ACNC: 34.6 SEC — HIGH (ref 9.8–12.7)
RBC # BLD: 3.19 M/UL — LOW (ref 4.4–5.2)
RBC # FLD: 15.9 % — HIGH (ref 11–15.6)
RBC BLD AUTO: ABNORMAL
S AUREUS DNA NOSE QL NAA+PROBE: DETECTED
SODIUM SERPL-SCNC: 141 MMOL/L — SIGNIFICANT CHANGE UP (ref 135–145)
WBC # BLD: 3.5 K/UL — LOW (ref 4.8–10.8)
WBC # FLD AUTO: 3.5 K/UL — LOW (ref 4.8–10.8)

## 2018-09-27 PROCEDURE — 87640 STAPH A DNA AMP PROBE: CPT

## 2018-09-27 PROCEDURE — 86850 RBC ANTIBODY SCREEN: CPT

## 2018-09-27 PROCEDURE — 86900 BLOOD TYPING SEROLOGIC ABO: CPT

## 2018-09-27 PROCEDURE — 85730 THROMBOPLASTIN TIME PARTIAL: CPT

## 2018-09-27 PROCEDURE — 85610 PROTHROMBIN TIME: CPT

## 2018-09-27 PROCEDURE — 85027 COMPLETE CBC AUTOMATED: CPT

## 2018-09-27 PROCEDURE — 93010 ELECTROCARDIOGRAM REPORT: CPT

## 2018-09-27 PROCEDURE — 87641 MR-STAPH DNA AMP PROBE: CPT

## 2018-09-27 PROCEDURE — 80048 BASIC METABOLIC PNL TOTAL CA: CPT

## 2018-09-27 PROCEDURE — 93005 ELECTROCARDIOGRAM TRACING: CPT

## 2018-09-27 PROCEDURE — G0463: CPT

## 2018-09-27 PROCEDURE — 36415 COLL VENOUS BLD VENIPUNCTURE: CPT

## 2018-09-27 PROCEDURE — 86901 BLOOD TYPING SEROLOGIC RH(D): CPT

## 2018-09-27 PROCEDURE — 71046 X-RAY EXAM CHEST 2 VIEWS: CPT | Mod: 26

## 2018-09-27 PROCEDURE — 83036 HEMOGLOBIN GLYCOSYLATED A1C: CPT

## 2018-09-27 PROCEDURE — 71046 X-RAY EXAM CHEST 2 VIEWS: CPT

## 2018-09-27 RX ORDER — SODIUM CHLORIDE 9 MG/ML
3 INJECTION INTRAMUSCULAR; INTRAVENOUS; SUBCUTANEOUS EVERY 8 HOURS
Qty: 0 | Refills: 0 | Status: DISCONTINUED | OUTPATIENT
Start: 2018-10-16 | End: 2018-10-19

## 2018-09-27 RX ORDER — FUROSEMIDE 40 MG
40 TABLET ORAL
Qty: 0 | Refills: 0 | COMMUNITY

## 2018-09-27 RX ORDER — TIOTROPIUM BROMIDE 18 UG/1
0 CAPSULE ORAL; RESPIRATORY (INHALATION)
Qty: 0 | Refills: 0 | COMMUNITY

## 2018-09-27 RX ORDER — ATENOLOL 25 MG/1
0 TABLET ORAL
Qty: 0 | Refills: 0 | COMMUNITY

## 2018-09-27 RX ORDER — RANITIDINE HYDROCHLORIDE 150 MG/1
0 TABLET, FILM COATED ORAL
Qty: 0 | Refills: 0 | COMMUNITY

## 2018-09-27 RX ORDER — FLUOXETINE HCL 10 MG
0 CAPSULE ORAL
Qty: 0 | Refills: 0 | COMMUNITY

## 2018-09-27 RX ORDER — ATORVASTATIN CALCIUM 80 MG/1
0 TABLET, FILM COATED ORAL
Qty: 0 | Refills: 0 | COMMUNITY

## 2018-09-27 RX ORDER — TIOTROPIUM BROMIDE 18 UG/1
1 CAPSULE ORAL; RESPIRATORY (INHALATION)
Qty: 0 | Refills: 0 | COMMUNITY

## 2018-09-27 RX ORDER — WARFARIN SODIUM 2.5 MG/1
0 TABLET ORAL
Qty: 0 | Refills: 0 | COMMUNITY

## 2018-09-27 RX ORDER — GABAPENTIN 400 MG/1
0 CAPSULE ORAL
Qty: 0 | Refills: 0 | COMMUNITY

## 2018-09-27 RX ORDER — OMEPRAZOLE 10 MG/1
0 CAPSULE, DELAYED RELEASE ORAL
Qty: 0 | Refills: 0 | COMMUNITY

## 2018-09-27 RX ORDER — BUDESONIDE, MICRONIZED 100 %
0 POWDER (GRAM) MISCELLANEOUS
Qty: 60 | Refills: 0 | COMMUNITY

## 2018-09-27 RX ORDER — GABAPENTIN 400 MG/1
1 CAPSULE ORAL
Qty: 0 | Refills: 0 | COMMUNITY

## 2018-09-27 RX ORDER — MUPIROCIN 20 MG/G
1 OINTMENT TOPICAL
Qty: 1 | Refills: 0 | OUTPATIENT
Start: 2018-09-27 | End: 2018-10-01

## 2018-09-27 RX ORDER — OXYBUTYNIN CHLORIDE 5 MG
0 TABLET ORAL
Qty: 0 | Refills: 0 | COMMUNITY

## 2018-09-27 NOTE — H&P PST ADULT - VENOUS THROMBOEMBOLISM CURRENT STATUS
minor surgery planned/abnormal pulmonary function (COPD) abnormal pulmonary function (COPD)/elective major lower extremity arthroplasty/swollen legs/varicose veins

## 2018-09-27 NOTE — H&P PST ADULT - PROBLEM SELECTOR PLAN 5
continue medication as directed continue medication as directed  vascular clearance pending  wound care as per vascular

## 2018-09-27 NOTE — H&P PST ADULT - ASSESSMENT
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  Schedule now for left TKR.    CAPRINI SCORE [CLOT]    AGE RELATED RISK FACTORS                                                       MOBILITY RELATED FACTORS  [ ] Age 41-60 years                                            (1 Point)                  [ ] Bed rest                                                        (1 Point)  [ ] Age: 61-74 years                                           (2 Points)                 [ ] Plaster cast                                                   (2 Points)  [ ] Age= 75 years                                              (3 Points)                 [ ] Bed bound for more than 72 hours                 (2 Points)    DISEASE RELATED RISK FACTORS                                               GENDER SPECIFIC FACTORS  [ ] Edema in the lower extremities                       (1 Point)                  [ ] Pregnancy                                                     (1 Point)  [ ] Varicose veins                                               (1 Point)                  [ ] Post-partum < 6 weeks                                   (1 Point)             [ ] BMI > 25 Kg/m2                                            (1 Point)                  [ ] Hormonal therapy  or oral contraception          (1 Point)                 [ ] Sepsis (in the previous month)                        (1 Point)                  [ ] History of pregnancy complications                 (1 point)  [ ] Pneumonia or serious lung disease                                               [ ] Unexplained or recurrent                     (1 Point)           (in the previous month)                               (1 Point)  [ ] Abnormal pulmonary function test                     (1 Point)                 SURGERY RELATED RISK FACTORS  [ ] Acute myocardial infarction                              (1 Point)                 [ ]  Section                                             (1 Point)  [ ] Congestive heart failure (in the previous month)  (1 Point)               [ ] Minor surgery                                                  (1 Point)   [ ] Inflammatory bowel disease                             (1 Point)                 [ ] Arthroscopic surgery                                        (2 Points)  [ ] Central venous access                                      (2 Points)                [ ] General surgery lasting more than 45 minutes   (2 Points)       [ ] Stroke (in the previous month)                          (5 Points)               [ ] Elective arthroplasty                                         (5 Points)                                                                                                                                               HEMATOLOGY RELATED FACTORS                                                 TRAUMA RELATED RISK FACTORS  [ ] Prior episodes of VTE                                     (3 Points)                [ ] Fracture of the hip, pelvis, or leg                       (5 Points)  [ ] Positive family history for VTE                         (3 Points)                 [ ] Acute spinal cord injury (in the previous month)  (5 Points)  [ ] Prothrombin 85425 A                                     (3 Points)                 [ ] Paralysis  (less than 1 month)                             (5 Points)  [ ] Factor V Leiden                                             (3 Points)                  [ ] Multiple Trauma within 1 month                        (5 Points)  [ ] Lupus anticoagulants                                     (3 Points)                                                           [ ] Anticardiolipin antibodies                               (3 Points)                                                       [ ] High homocysteine in the blood                      (3 Points)                                             [ ] Other congenital or acquired thrombophilia      (3 Points)                                                [ ] Heparin induced thrombocytopenia                  (3 Points)                                          Total Score [          ] 84 year old female with h/o Afib, PE, DVT on coumadin, s/p IVC fliter,  HTN, CAD, CHF, PPM, AV node ablation, TIA, 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  Schedule now for left TKR.    CAPRINI SCORE [CLOT]    AGE RELATED RISK FACTORS                                                       MOBILITY RELATED FACTORS  [ ] Age 41-60 years                                            (1 Point)                  [ ] Bed rest                                                        (1 Point)  [ ] Age: 61-74 years                                           (2 Points)                 [ ] Plaster cast                                                   (2 Points)  [ x] Age= 75 years                                              (3 Points)                 [ ] Bed bound for more than 72 hours                 (2 Points)    DISEASE RELATED RISK FACTORS                                               GENDER SPECIFIC FACTORS  [x ] Edema in the lower extremities                       (1 Point)                  [ ] Pregnancy                                                     (1 Point)  [x ] Varicose veins                                               (1 Point)                  [ ] Post-partum < 6 weeks                                   (1 Point)             [ x] BMI > 25 Kg/m2                                            (1 Point)                  [ ] Hormonal therapy  or oral contraception          (1 Point)                 [ ] Sepsis (in the previous month)                        (1 Point)                  [ ] History of pregnancy complications                 (1 point)  [ ] Pneumonia or serious lung disease                                               [ ] Unexplained or recurrent                     (1 Point)           (in the previous month)                               (1 Point)  [ x] Abnormal pulmonary function test                     (1 Point)                 SURGERY RELATED RISK FACTORS  [ ] Acute myocardial infarction                              (1 Point)                 [ ]  Section                                             (1 Point)  [ ] Congestive heart failure (in the previous month)  (1 Point)               [ ] Minor surgery                                                  (1 Point)   [ ] Inflammatory bowel disease                             (1 Point)                 [ ] Arthroscopic surgery                                        (2 Points)  [ ] Central venous access                                      (2 Points)                [ ] General surgery lasting more than 45 minutes   (2 Points)       [ ] Stroke (in the previous month)                          (5 Points)               [x ] Elective arthroplasty                                         (5 Points)                                                                                                                                               HEMATOLOGY RELATED FACTORS                                                 TRAUMA RELATED RISK FACTORS  [x ] Prior episodes of VTE                                     (3 Points)                [ ] Fracture of the hip, pelvis, or leg                       (5 Points)  [ ] Positive family history for VTE                         (3 Points)                 [ ] Acute spinal cord injury (in the previous month)  (5 Points)  [ ] Prothrombin 39412 A                                     (3 Points)                 [ ] Paralysis  (less than 1 month)                             (5 Points)  [ ] Factor V Leiden                                             (3 Points)                  [ ] Multiple Trauma within 1 month                        (5 Points)  [ ] Lupus anticoagulants                                     (3 Points)                                                           [ ] Anticardiolipin antibodies                               (3 Points)                                                       [ ] High homocysteine in the blood                      (3 Points)                                             [ ] Other congenital or acquired thrombophilia      (3 Points)                                                [ ] Heparin induced thrombocytopenia                  (3 Points)                                          Total Score [  15  ]

## 2018-09-27 NOTE — H&P PST ADULT - MUSCULOSKELETAL
details… detailed exam no joint swelling/decreased ROM due to pain/calf tenderness/diminished strength

## 2018-09-27 NOTE — H&P PST ADULT - PROBLEM SELECTOR PLAN 2
continue medication as directed continue medication as directed  Cardiac clearance pending  instructed to hold coumadin at the discretion of the cardiologist

## 2018-09-27 NOTE — H&P PST ADULT - PMH
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

## 2018-09-27 NOTE — H&P PST ADULT - FAMILY HISTORY
No pertinent family history in first degree relatives     Mother  Still living? No  Family history of coronary artery disease, Age at diagnosis: 61-70

## 2018-09-27 NOTE — H&P PST ADULT - PROBLEM SELECTOR PLAN 4
continue medication as directed continue medication as directed  O2 PRN  Chest x- ray  Pulmonary clearance pending

## 2018-09-27 NOTE — H&P PST ADULT - HISTORY OF PRESENT ILLNESS
84 y.o. female with multiple comorbidities present with c/o left  knee pain unrelieved with conservative measures.   She is s/p right TKR and doing well with it. She ambulates with walker and now schedule for LEft TKR. 84 y.o. female with multiple comorbidities present with c/o left  knee pain unrelieved with conservative measures.   She is s/p right TKR and doing well with it. She ambulates with walker and now schedule for LEft TKR.       of note patient is currently under the care of Dr. Mayers for vascular wounds/ ulcers, and receiving wound care.

## 2018-09-27 NOTE — PATIENT PROFILE ADULT. - PSH
Acquired ptosis of eyelid  2014  Acute carpal tunnel syndrome  with surgery  Cardiac pacemaker    Complete tear of rotator cuff, right  1998  H/O atrioventricular eva ablation    H/O cardiac catheterization  2005 & 2007- no stents  H/O laminectomy  3/4 lumbar    2002  H/O repair of right rotator cuff    H/O total hysterectomy  1971  History of cholecystectomy    History of lumbar laminectomy  L3-4  History of total knee arthroplasty, right    Internal hemorrhoid  banded and cauterized  Presence of inferior vena cava filter    Ptosis, both eyelids    Rectal adenoma  s/p excision 2005  S/P cataract surgery, left  June 2010  S/P cataract surgery, right  May 2010  S/P cholecystectomy  1991  S/P IVC filter  placed 1/9  S/P knee replacement, right  2007  Status post Mohs surgery for basal cell carcinoma  left knee

## 2018-09-27 NOTE — H&P PST ADULT - PSH
Acquired ptosis of eyelid  2014  Acute carpal tunnel syndrome  with surgery  Age-related cataract of both eyes    Cardiac pacemaker    Complete tear of rotator cuff, right  1998  H/O abdominal hysterectomy    H/O atrioventricular eva ablation    H/O cardiac catheterization  2005 & 2007  H/O laminectomy  3/4 lumbar    2002  H/O repair of right rotator cuff    H/O total hysterectomy  1971  History of cholecystectomy    History of lumbar laminectomy  L3-4  History of total knee arthroplasty, right    Internal hemorrhoid  banded and cauterized  Presence of inferior vena cava filter    Ptosis, both eyelids    Rectal adenoma  s/p excision 2005  Rectal adenoma    S/P cataract surgery, left  June 2010  S/P cataract surgery, right  May 2010  S/P cholecystectomy  1991  S/P IVC filter  placed 1/9  S/P knee replacement, right  2007 Acquired ptosis of eyelid  2014  Acute carpal tunnel syndrome  with surgery  Cardiac pacemaker    Complete tear of rotator cuff, right  1998  H/O atrioventricular eva ablation    H/O cardiac catheterization  2005 & 2007- no stents  H/O laminectomy  3/4 lumbar    2002  H/O repair of right rotator cuff    H/O total hysterectomy  1971  History of cholecystectomy    History of lumbar laminectomy  L3-4  History of total knee arthroplasty, right    Internal hemorrhoid  banded and cauterized  Presence of inferior vena cava filter    Ptosis, both eyelids    Rectal adenoma  s/p excision 2005  S/P cataract surgery, left  June 2010  S/P cataract surgery, right  May 2010  S/P cholecystectomy  1991  S/P IVC filter  placed 1/9  S/P knee replacement, right  2007  Status post Mohs surgery for basal cell carcinoma  left knee

## 2018-09-29 PROBLEM — K21.9 GASTRO-ESOPHAGEAL REFLUX DISEASE WITHOUT ESOPHAGITIS: Chronic | Status: INACTIVE | Noted: 2018-04-04 | Resolved: 2018-09-27

## 2018-09-29 PROBLEM — I26.99 OTHER PULMONARY EMBOLISM WITHOUT ACUTE COR PULMONALE: Chronic | Status: INACTIVE | Noted: 2018-04-04 | Resolved: 2018-09-27

## 2018-09-29 PROBLEM — I82.409 ACUTE EMBOLISM AND THROMBOSIS OF UNSPECIFIED DEEP VEINS OF UNSPECIFIED LOWER EXTREMITY: Chronic | Status: INACTIVE | Noted: 2018-04-04 | Resolved: 2018-09-27

## 2018-09-29 PROBLEM — I50.9 HEART FAILURE, UNSPECIFIED: Chronic | Status: INACTIVE | Noted: 2018-04-04 | Resolved: 2018-09-27

## 2018-09-29 PROBLEM — J44.9 CHRONIC OBSTRUCTIVE PULMONARY DISEASE, UNSPECIFIED: Chronic | Status: INACTIVE | Noted: 2018-04-04 | Resolved: 2018-09-27

## 2018-09-29 PROBLEM — Z95.0 PRESENCE OF CARDIAC PACEMAKER: Chronic | Status: INACTIVE | Noted: 2018-04-04 | Resolved: 2018-09-27

## 2018-09-29 PROBLEM — I48.91 UNSPECIFIED ATRIAL FIBRILLATION: Chronic | Status: INACTIVE | Noted: 2018-04-04 | Resolved: 2018-09-27

## 2018-09-29 PROBLEM — G45.9 TRANSIENT CEREBRAL ISCHEMIC ATTACK, UNSPECIFIED: Chronic | Status: INACTIVE | Noted: 2018-04-04 | Resolved: 2018-09-27

## 2018-10-09 ENCOUNTER — APPOINTMENT (OUTPATIENT)
Dept: PULMONOLOGY | Facility: CLINIC | Age: 83
End: 2018-10-09
Payer: MEDICARE

## 2018-10-09 VITALS — HEART RATE: 66 BPM | OXYGEN SATURATION: 92 %

## 2018-10-09 VITALS — SYSTOLIC BLOOD PRESSURE: 100 MMHG | DIASTOLIC BLOOD PRESSURE: 60 MMHG

## 2018-10-09 VITALS — OXYGEN SATURATION: 95 %

## 2018-10-09 VITALS — BODY MASS INDEX: 36.49 KG/M2 | WEIGHT: 240 LBS

## 2018-10-09 DIAGNOSIS — Z01.811 ENCOUNTER FOR PREPROCEDURAL RESPIRATORY EXAMINATION: ICD-10-CM

## 2018-10-09 DIAGNOSIS — J44.9 CHRONIC OBSTRUCTIVE PULMONARY DISEASE, UNSPECIFIED: ICD-10-CM

## 2018-10-09 PROCEDURE — 94010 BREATHING CAPACITY TEST: CPT

## 2018-10-09 PROCEDURE — 99214 OFFICE O/P EST MOD 30 MIN: CPT | Mod: 25

## 2018-10-16 ENCOUNTER — TRANSCRIPTION ENCOUNTER (OUTPATIENT)
Age: 83
End: 2018-10-16

## 2018-10-16 ENCOUNTER — RESULT REVIEW (OUTPATIENT)
Age: 83
End: 2018-10-16

## 2018-10-16 ENCOUNTER — INPATIENT (INPATIENT)
Facility: HOSPITAL | Age: 83
LOS: 2 days | Discharge: HOME | DRG: 470 | End: 2018-10-19
Attending: ORTHOPAEDIC SURGERY | Admitting: ORTHOPAEDIC SURGERY
Payer: MEDICARE

## 2018-10-16 ENCOUNTER — APPOINTMENT (OUTPATIENT)
Dept: ORTHOPEDIC SURGERY | Facility: HOSPITAL | Age: 83
End: 2018-10-16

## 2018-10-16 VITALS
OXYGEN SATURATION: 99 % | RESPIRATION RATE: 16 BRPM | SYSTOLIC BLOOD PRESSURE: 149 MMHG | TEMPERATURE: 99 F | DIASTOLIC BLOOD PRESSURE: 72 MMHG | HEART RATE: 61 BPM | HEIGHT: 68 IN | WEIGHT: 244.71 LBS

## 2018-10-16 DIAGNOSIS — Z95.828 PRESENCE OF OTHER VASCULAR IMPLANTS AND GRAFTS: Chronic | ICD-10-CM

## 2018-10-16 DIAGNOSIS — Z98.890 OTHER SPECIFIED POSTPROCEDURAL STATES: Chronic | ICD-10-CM

## 2018-10-16 DIAGNOSIS — M17.12 UNILATERAL PRIMARY OSTEOARTHRITIS, LEFT KNEE: ICD-10-CM

## 2018-10-16 DIAGNOSIS — Z98.89 OTHER SPECIFIED POSTPROCEDURAL STATES: Chronic | ICD-10-CM

## 2018-10-16 DIAGNOSIS — K64.8 OTHER HEMORRHOIDS: Chronic | ICD-10-CM

## 2018-10-16 DIAGNOSIS — H02.403 UNSPECIFIED PTOSIS OF BILATERAL EYELIDS: Chronic | ICD-10-CM

## 2018-10-16 DIAGNOSIS — H02.409 UNSPECIFIED PTOSIS OF UNSPECIFIED EYELID: Chronic | ICD-10-CM

## 2018-10-16 DIAGNOSIS — D12.6 BENIGN NEOPLASM OF COLON, UNSPECIFIED: Chronic | ICD-10-CM

## 2018-10-16 DIAGNOSIS — Z96.651 PRESENCE OF RIGHT ARTIFICIAL KNEE JOINT: Chronic | ICD-10-CM

## 2018-10-16 DIAGNOSIS — M75.121 COMPLETE ROTATOR CUFF TEAR OR RUPTURE OF RIGHT SHOULDER, NOT SPECIFIED AS TRAUMATIC: Chronic | ICD-10-CM

## 2018-10-16 DIAGNOSIS — Z95.0 PRESENCE OF CARDIAC PACEMAKER: Chronic | ICD-10-CM

## 2018-10-16 DIAGNOSIS — Z90.49 ACQUIRED ABSENCE OF OTHER SPECIFIED PARTS OF DIGESTIVE TRACT: Chronic | ICD-10-CM

## 2018-10-16 DIAGNOSIS — Z98.41 CATARACT EXTRACTION STATUS, RIGHT EYE: Chronic | ICD-10-CM

## 2018-10-16 DIAGNOSIS — Z98.42 CATARACT EXTRACTION STATUS, LEFT EYE: Chronic | ICD-10-CM

## 2018-10-16 DIAGNOSIS — G56.00 CARPAL TUNNEL SYNDROME, UNSPECIFIED UPPER LIMB: Chronic | ICD-10-CM

## 2018-10-16 DIAGNOSIS — Z90.710 ACQUIRED ABSENCE OF BOTH CERVIX AND UTERUS: Chronic | ICD-10-CM

## 2018-10-16 LAB
APTT BLD: 37.9 SEC — HIGH (ref 27.5–37.4)
BLD GP AB SCN SERPL QL: SIGNIFICANT CHANGE UP
GLUCOSE BLDC GLUCOMTR-MCNC: 100 MG/DL — HIGH (ref 70–99)
GLUCOSE BLDC GLUCOMTR-MCNC: 88 MG/DL — SIGNIFICANT CHANGE UP (ref 70–99)
GLUCOSE BLDC GLUCOMTR-MCNC: 97 MG/DL — SIGNIFICANT CHANGE UP (ref 70–99)
INR BLD: 1.66 RATIO — HIGH (ref 0.88–1.16)
PROTHROM AB SERPL-ACNC: 18.4 SEC — HIGH (ref 9.8–12.7)
TYPE + AB SCN PNL BLD: SIGNIFICANT CHANGE UP

## 2018-10-16 PROCEDURE — 20985 CPTR-ASST DIR MS PX: CPT

## 2018-10-16 PROCEDURE — 27447 TOTAL KNEE ARTHROPLASTY: CPT | Mod: AS,LT

## 2018-10-16 PROCEDURE — 20985 CPTR-ASST DIR MS PX: CPT | Mod: AS

## 2018-10-16 PROCEDURE — 27447 TOTAL KNEE ARTHROPLASTY: CPT | Mod: LT

## 2018-10-16 PROCEDURE — 88311 DECALCIFY TISSUE: CPT | Mod: 26

## 2018-10-16 PROCEDURE — 88305 TISSUE EXAM BY PATHOLOGIST: CPT | Mod: 26

## 2018-10-16 PROCEDURE — 99222 1ST HOSP IP/OBS MODERATE 55: CPT

## 2018-10-16 PROCEDURE — 73560 X-RAY EXAM OF KNEE 1 OR 2: CPT | Mod: 26,LT

## 2018-10-16 RX ORDER — KETOROLAC TROMETHAMINE 30 MG/ML
15 SYRINGE (ML) INJECTION ONCE
Qty: 0 | Refills: 0 | Status: DISCONTINUED | OUTPATIENT
Start: 2018-10-16 | End: 2018-10-17

## 2018-10-16 RX ORDER — SODIUM CHLORIDE 9 MG/ML
1000 INJECTION, SOLUTION INTRAVENOUS
Qty: 0 | Refills: 0 | Status: DISCONTINUED | OUTPATIENT
Start: 2018-10-16 | End: 2018-10-16

## 2018-10-16 RX ORDER — BUDESONIDE, MICRONIZED 100 %
0.25 POWDER (GRAM) MISCELLANEOUS DAILY
Qty: 0 | Refills: 0 | Status: DISCONTINUED | OUTPATIENT
Start: 2018-10-16 | End: 2018-10-19

## 2018-10-16 RX ORDER — WARFARIN SODIUM 2.5 MG/1
4 TABLET ORAL ONCE
Qty: 0 | Refills: 0 | Status: COMPLETED | OUTPATIENT
Start: 2018-10-16 | End: 2018-10-16

## 2018-10-16 RX ORDER — FUROSEMIDE 40 MG
20 TABLET ORAL DAILY
Qty: 0 | Refills: 0 | Status: DISCONTINUED | OUTPATIENT
Start: 2018-10-16 | End: 2018-10-19

## 2018-10-16 RX ORDER — ENOXAPARIN SODIUM 100 MG/ML
30 INJECTION SUBCUTANEOUS
Qty: 0 | Refills: 0 | Status: DISCONTINUED | OUTPATIENT
Start: 2018-10-17 | End: 2018-10-19

## 2018-10-16 RX ORDER — FENTANYL CITRATE 50 UG/ML
25 INJECTION INTRAVENOUS
Qty: 0 | Refills: 0 | Status: DISCONTINUED | OUTPATIENT
Start: 2018-10-16 | End: 2018-10-16

## 2018-10-16 RX ORDER — IPRATROPIUM/ALBUTEROL SULFATE 18-103MCG
3 AEROSOL WITH ADAPTER (GRAM) INHALATION ONCE
Qty: 0 | Refills: 0 | Status: COMPLETED | OUTPATIENT
Start: 2018-10-16 | End: 2018-10-16

## 2018-10-16 RX ORDER — FERROUS SULFATE 325(65) MG
325 TABLET ORAL DAILY
Qty: 0 | Refills: 0 | Status: DISCONTINUED | OUTPATIENT
Start: 2018-10-16 | End: 2018-10-19

## 2018-10-16 RX ORDER — CEFAZOLIN SODIUM 1 G
2000 VIAL (EA) INJECTION ONCE
Qty: 0 | Refills: 0 | Status: DISCONTINUED | OUTPATIENT
Start: 2018-10-16 | End: 2018-10-16

## 2018-10-16 RX ORDER — FLUOXETINE HCL 10 MG
20 CAPSULE ORAL DAILY
Qty: 0 | Refills: 0 | Status: DISCONTINUED | OUTPATIENT
Start: 2018-10-16 | End: 2018-10-19

## 2018-10-16 RX ORDER — ACETAMINOPHEN 500 MG
1000 TABLET ORAL ONCE
Qty: 0 | Refills: 0 | Status: DISCONTINUED | OUTPATIENT
Start: 2018-10-16 | End: 2018-10-16

## 2018-10-16 RX ORDER — CELECOXIB 200 MG/1
200 CAPSULE ORAL
Qty: 0 | Refills: 0 | Status: DISCONTINUED | OUTPATIENT
Start: 2018-10-18 | End: 2018-10-19

## 2018-10-16 RX ORDER — VANCOMYCIN HCL 1 G
1500 VIAL (EA) INTRAVENOUS ONCE
Qty: 0 | Refills: 0 | Status: COMPLETED | OUTPATIENT
Start: 2018-10-16 | End: 2018-10-16

## 2018-10-16 RX ORDER — ONDANSETRON 8 MG/1
4 TABLET, FILM COATED ORAL EVERY 6 HOURS
Qty: 0 | Refills: 0 | Status: DISCONTINUED | OUTPATIENT
Start: 2018-10-16 | End: 2018-10-19

## 2018-10-16 RX ORDER — GABAPENTIN 400 MG/1
300 CAPSULE ORAL
Qty: 0 | Refills: 0 | Status: DISCONTINUED | OUTPATIENT
Start: 2018-10-16 | End: 2018-10-19

## 2018-10-16 RX ORDER — OXYCODONE HYDROCHLORIDE 5 MG/1
5 TABLET ORAL
Qty: 0 | Refills: 0 | Status: DISCONTINUED | OUTPATIENT
Start: 2018-10-16 | End: 2018-10-19

## 2018-10-16 RX ORDER — SODIUM CHLORIDE 9 MG/ML
1000 INJECTION, SOLUTION INTRAVENOUS
Qty: 0 | Refills: 0 | Status: DISCONTINUED | OUTPATIENT
Start: 2018-10-16 | End: 2018-10-17

## 2018-10-16 RX ORDER — ATORVASTATIN CALCIUM 80 MG/1
20 TABLET, FILM COATED ORAL DAILY
Qty: 0 | Refills: 0 | Status: DISCONTINUED | OUTPATIENT
Start: 2018-10-16 | End: 2018-10-17

## 2018-10-16 RX ORDER — SENNA PLUS 8.6 MG/1
2 TABLET ORAL AT BEDTIME
Qty: 0 | Refills: 0 | Status: DISCONTINUED | OUTPATIENT
Start: 2018-10-16 | End: 2018-10-19

## 2018-10-16 RX ORDER — DOCUSATE SODIUM 100 MG
100 CAPSULE ORAL THREE TIMES A DAY
Qty: 0 | Refills: 0 | Status: DISCONTINUED | OUTPATIENT
Start: 2018-10-16 | End: 2018-10-19

## 2018-10-16 RX ORDER — ONDANSETRON 8 MG/1
4 TABLET, FILM COATED ORAL ONCE
Qty: 0 | Refills: 0 | Status: DISCONTINUED | OUTPATIENT
Start: 2018-10-16 | End: 2018-10-16

## 2018-10-16 RX ORDER — ACETAMINOPHEN 500 MG
1000 TABLET ORAL
Qty: 0 | Refills: 0 | Status: COMPLETED | OUTPATIENT
Start: 2018-10-16 | End: 2018-10-16

## 2018-10-16 RX ORDER — TRANEXAMIC ACID 100 MG/ML
1100 INJECTION, SOLUTION INTRAVENOUS ONCE
Qty: 0 | Refills: 0 | Status: DISCONTINUED | OUTPATIENT
Start: 2018-10-16 | End: 2018-10-16

## 2018-10-16 RX ORDER — CEFAZOLIN SODIUM 1 G
2000 VIAL (EA) INJECTION
Qty: 0 | Refills: 0 | Status: COMPLETED | OUTPATIENT
Start: 2018-10-16 | End: 2018-10-17

## 2018-10-16 RX ORDER — OXYBUTYNIN CHLORIDE 5 MG
5 TABLET ORAL DAILY
Qty: 0 | Refills: 0 | Status: DISCONTINUED | OUTPATIENT
Start: 2018-10-16 | End: 2018-10-16

## 2018-10-16 RX ORDER — OXYCODONE HYDROCHLORIDE 5 MG/1
10 TABLET ORAL ONCE
Qty: 0 | Refills: 0 | Status: DISCONTINUED | OUTPATIENT
Start: 2018-10-16 | End: 2018-10-16

## 2018-10-16 RX ORDER — POLYETHYLENE GLYCOL 3350 17 G/17G
17 POWDER, FOR SOLUTION ORAL DAILY
Qty: 0 | Refills: 0 | Status: DISCONTINUED | OUTPATIENT
Start: 2018-10-16 | End: 2018-10-19

## 2018-10-16 RX ORDER — OXYBUTYNIN CHLORIDE 5 MG
5 TABLET ORAL DAILY
Qty: 0 | Refills: 0 | Status: DISCONTINUED | OUTPATIENT
Start: 2018-10-16 | End: 2018-10-19

## 2018-10-16 RX ORDER — OXYCODONE HYDROCHLORIDE 5 MG/1
10 TABLET ORAL
Qty: 0 | Refills: 0 | Status: DISCONTINUED | OUTPATIENT
Start: 2018-10-16 | End: 2018-10-19

## 2018-10-16 RX ORDER — GABAPENTIN 400 MG/1
600 CAPSULE ORAL ONCE
Qty: 0 | Refills: 0 | Status: COMPLETED | OUTPATIENT
Start: 2018-10-16 | End: 2018-10-16

## 2018-10-16 RX ORDER — PANTOPRAZOLE SODIUM 20 MG/1
40 TABLET, DELAYED RELEASE ORAL
Qty: 0 | Refills: 0 | Status: DISCONTINUED | OUTPATIENT
Start: 2018-10-16 | End: 2018-10-19

## 2018-10-16 RX ORDER — FENTANYL CITRATE 50 UG/ML
50 INJECTION INTRAVENOUS ONCE
Qty: 0 | Refills: 0 | Status: DISCONTINUED | OUTPATIENT
Start: 2018-10-16 | End: 2018-10-16

## 2018-10-16 RX ORDER — MAGNESIUM HYDROXIDE 400 MG/1
30 TABLET, CHEWABLE ORAL DAILY
Qty: 0 | Refills: 0 | Status: DISCONTINUED | OUTPATIENT
Start: 2018-10-16 | End: 2018-10-19

## 2018-10-16 RX ORDER — CELECOXIB 200 MG/1
400 CAPSULE ORAL ONCE
Qty: 0 | Refills: 0 | Status: COMPLETED | OUTPATIENT
Start: 2018-10-16 | End: 2018-10-16

## 2018-10-16 RX ORDER — HYDROMORPHONE HYDROCHLORIDE 2 MG/ML
0.5 INJECTION INTRAMUSCULAR; INTRAVENOUS; SUBCUTANEOUS
Qty: 0 | Refills: 0 | Status: DISCONTINUED | OUTPATIENT
Start: 2018-10-16 | End: 2018-10-19

## 2018-10-16 RX ORDER — ATENOLOL 25 MG/1
25 TABLET ORAL DAILY
Qty: 0 | Refills: 0 | Status: DISCONTINUED | OUTPATIENT
Start: 2018-10-16 | End: 2018-10-19

## 2018-10-16 RX ADMIN — Medication 300 MILLIGRAM(S): at 11:48

## 2018-10-16 RX ADMIN — GABAPENTIN 300 MILLIGRAM(S): 400 CAPSULE ORAL at 22:24

## 2018-10-16 RX ADMIN — CELECOXIB 400 MILLIGRAM(S): 200 CAPSULE ORAL at 11:41

## 2018-10-16 RX ADMIN — Medication 100 MILLIGRAM(S): at 22:36

## 2018-10-16 RX ADMIN — WARFARIN SODIUM 4 MILLIGRAM(S): 2.5 TABLET ORAL at 22:17

## 2018-10-16 RX ADMIN — Medication 400 MILLIGRAM(S): at 22:23

## 2018-10-16 RX ADMIN — GABAPENTIN 600 MILLIGRAM(S): 400 CAPSULE ORAL at 11:40

## 2018-10-16 RX ADMIN — SODIUM CHLORIDE 3 MILLILITER(S): 9 INJECTION INTRAMUSCULAR; INTRAVENOUS; SUBCUTANEOUS at 22:17

## 2018-10-16 RX ADMIN — OXYCODONE HYDROCHLORIDE 10 MILLIGRAM(S): 5 TABLET ORAL at 11:40

## 2018-10-16 RX ADMIN — Medication 300 MILLIGRAM(S): at 22:04

## 2018-10-16 RX ADMIN — Medication 3 MILLILITER(S): at 12:34

## 2018-10-16 RX ADMIN — SODIUM CHLORIDE 85 MILLILITER(S): 9 INJECTION, SOLUTION INTRAVENOUS at 23:11

## 2018-10-16 RX ADMIN — Medication 100 MILLIGRAM(S): at 22:18

## 2018-10-16 RX ADMIN — Medication 1000 MILLIGRAM(S): at 22:40

## 2018-10-16 NOTE — DISCHARGE NOTE ADULT - MEDICATION SUMMARY - MEDICATIONS TO TAKE
I will START or STAY ON the medications listed below when I get home from the hospital:    BUDESONIDE 0.25 MG/2 ML SUSP  -- 1  inhaled , As Needed  -- Indication: For home med    acetaminophen 325 mg oral tablet  -- 3 tab(s) by mouth every 8 hours  -- Indication: For Pain    oxyCODONE 5 mg oral tablet  -- 1-2 tab(s) by mouth every 4 to 6 hours, As Needed -for mild pain - for moderate pain MDD:6   -- Caution federal law prohibits the transfer of this drug to any person other  than the person for whom it was prescribed.  It is very important that you take or use this exactly as directed.  Do not skip doses or discontinue unless directed by your doctor.  May cause drowsiness.  Alcohol may intensify this effect.  Use care when operating dangerous machinery.  This prescription cannot be refilled.  Using more of this medication than prescribed may cause serious breathing problems.    -- Indication: For Pain    warfarin 4 mg oral tablet  -- 1 tab(s) by mouth once a day  -- Indication: For home med    gabapentin 300 mg oral capsule  -- 1 cap(s) by mouth 2 times a day  -- Indication: For home med    FLUoxetine 20 mg oral capsule  -- 1 cap(s) by mouth once a day  -- Indication: For home med    atorvastatin  -- 20 milligram(s) by mouth once a day  -- Indication: For home med    atenolol 25 mg oral tablet  -- 1 tab(s) by mouth once a day  -- Indication: For home med    furosemide  -- 20 milligram(s) by mouth once a day  -- Indication: For home med    Zantac 150  -- 150 milligram(s) by mouth 2 times a day  -- Indication: For home med    ferrous sulfate  -- 325 milligram(s) by mouth once a day  -- Indication: For home med    Senna S 50 mg-8.6 mg oral tablet  -- 2 tab(s) by mouth once a day (at bedtime)   -- Medication should be taken with plenty of water.    -- Indication: For constipation prevention    sucralfate 1 g oral tablet  -- 1 tab(s) by mouth 4 times a day (before meals and at bedtime)  -- Indication: For home med    omeprazole 20 mg oral delayed release capsule  -- 1 cap(s) by mouth once a day  -- Indication: For home med    oxybutynin 5 mg/24 hours oral tablet, extended release  -- 1 tab(s) by mouth once a day  -- Indication: For home med    cyanocobalamin  -- 2500 milligram(s) by mouth once a day  -- Indication: For home med    ergocalciferol  -- 2000 international unit(s) by mouth once a day  -- Indication: For home med

## 2018-10-16 NOTE — DISCHARGE NOTE ADULT - MEDICATION SUMMARY - MEDICATIONS TO STOP TAKING
I will STOP taking the medications listed below when I get home from the hospital:    mupirocin 2% topical ointment  -- Apply on skin in both nostrils twice a day for 5 days  -- For external use only. I will STOP taking the medications listed below when I get home from the hospital:    Tylenol  -- 650 milligram(s) by mouth every 6 hours, As Needed    docusate sodium 100 mg oral capsule  -- 1 cap(s) by mouth 3 times a day    mupirocin 2% topical ointment  -- Apply on skin in both nostrils twice a day for 5 days  -- For external use only.

## 2018-10-16 NOTE — DISCHARGE NOTE ADULT - ADDITIONAL INSTRUCTIONS
Patient should have blood drawn for INR level by next Monday 10/22/18. If needed be redosed by primary care doctor. Patient should have blood drawn for INR level by next Monday 10/22/18. If needed be redosed by primary care doctor.  Take iron pill daily and get your cbc check in 2 weeks.

## 2018-10-16 NOTE — CONSULT NOTE ADULT - ASSESSMENT
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      HTN: Will continue with atenolol 25 mg a day, will Monitor BP.     Hx of DVT and PE: As per Ortho will continue with Lovenox 30mg BID, She will need to be switched back to coumadin.     HLD: Continue with atorvastatin: Last Dose Taken: 20 milligram(s) orally once a day..     Hx of CHF: Patient is Euvolemic will continue with her Lasix 20mg daily, will monitor for the volume overload.     Neuropathy: Gabapentin 300 mg 2 times a day      Depression: will continue with FLUoxetine 20 mg once a day.

## 2018-10-16 NOTE — DISCHARGE NOTE ADULT - CARE PLAN
Principal Discharge DX:	Osteoarthritis  Goal:	Improved function and pain control  Assessment and plan of treatment:	The patient will be seen in the office in 3 weeks for wound check. Sutures/Staples/Tape will be removed at that time. Patient may shower after post-op day #3 (10/19/18). The dressing is to be removed on post op day #9 (10/25/18). IF THE DRESSING BECOMES SOILED BEFORE THE REMOVAL DATE, CHANGE WITH A SIMILAR DRESSING. IF THE DRESSING BECOMES STAINED WITH DISCHARGE, CONTACT THE OFFICE FOR FURTHER DIRECTIONS. The patient will contact the office if the wound becomes red, has increasing pain, develops bleeding or discharge, an injury occurs, or has other concerns. The patient will continue PT consistent with total knee replacement. The patient will continue LOVENOX/COUMADIN until INR is within therapeutic range, then patient will resume normal home dose of COUMADIN for blood clot prevention. The patient will take OXYCODONE AND TYLENOL for pain control and titrate according to prescription and patient needs. The patient will take Senna-S while taking oxycodone to prevent narcotic associated constipation.  Additionally, increase water intake (drink at least 8 glasses of water daily) and try adding fiber to the diet by eating fruits, vegetables and foods that are rich in grains. If constipation is experienced, contact the medical/primary care provider to discuss further treatment options. The patient is FULL weight bearing. Elevation of the lower leg is recommended to reduce swelling. Principal Discharge DX:	Osteoarthritis  Goal:	Improved function and pain control  Assessment and plan of treatment:	The patient will be seen in the office in 3 weeks for wound check. Tape will be removed at that time. Patient may shower after post-op day #3 (10/19/18). The dressing is to be removed on post op day #9 (10/25/18). IF THE DRESSING BECOMES SOILED BEFORE THE REMOVAL DATE, CHANGE WITH A SIMILAR DRESSING. IF THE DRESSING BECOMES STAINED WITH DISCHARGE, CONTACT THE OFFICE FOR FURTHER DIRECTIONS. The patient will contact the office if the wound becomes red, has increasing pain, develops bleeding or discharge, an injury occurs, or has other concerns. The patient will continue PT consistent with total knee replacement. The patient will continue LOVENOX/COUMADIN until INR is within therapeutic range, then patient will resume normal home dose of COUMADIN for blood clot prevention. The patient will take OXYCODONE AND TYLENOL for pain control and titrate according to prescription and patient needs. The patient will take Senna-S while taking oxycodone to prevent narcotic associated constipation.  Additionally, increase water intake (drink at least 8 glasses of water daily) and try adding fiber to the diet by eating fruits, vegetables and foods that are rich in grains. If constipation is experienced, contact the medical/primary care provider to discuss further treatment options. The patient is FULL weight bearing. Elevation of the lower leg is recommended to reduce swelling. Principal Discharge DX:	Osteoarthritis  Goal:	Improved function and pain control  Assessment and plan of treatment:	The patient will be seen in the office in 3 weeks for wound check. Tape will be removed at that time. Patient may shower after post-op day #3 (10/19/18). The dressing is to be removed on post op day #9 (10/25/18). IF THE DRESSING BECOMES SOILED BEFORE THE REMOVAL DATE, CHANGE WITH A SIMILAR DRESSING. IF THE DRESSING BECOMES STAINED WITH DISCHARGE, CONTACT THE OFFICE FOR FURTHER DIRECTIONS. The patient will contact the office if the wound becomes red, has increasing pain, develops bleeding or discharge, an injury occurs, or has other concerns. The patient will continue PT consistent with total knee replacement. The patient will resume normal home dose of COUMADIN for blood clot prevention. The patient will take OXYCODONE AND TYLENOL for pain control and titrate according to prescription and patient needs. The patient will take Senna-S while taking oxycodone to prevent narcotic associated constipation.  Additionally, increase water intake (drink at least 8 glasses of water daily) and try adding fiber to the diet by eating fruits, vegetables and foods that are rich in grains. If constipation is experienced, contact the medical/primary care provider to discuss further treatment options. The patient is FULL weight bearing. Elevation of the lower leg is recommended to reduce swelling.

## 2018-10-16 NOTE — PHYSICAL THERAPY INITIAL EVALUATION ADULT - RANGE OF MOTION EXAMINATION, REHAB EVAL
Right LE ROM was WFL (within functional limits)/left knee lacking approx  10-15 degrees extension to approx 40-45 degrees flexion (in sitting)/bilateral upper extremity ROM was WFL (within functional limits)

## 2018-10-16 NOTE — DISCHARGE NOTE ADULT - PATIENT PORTAL LINK FT
You can access the [facility] Patient Portal, offered by [facility], by registering with the following website: [email or web address]

## 2018-10-16 NOTE — DISCHARGE NOTE ADULT - PLAN OF CARE
Improved function and pain control The patient will be seen in the office in 3 weeks for wound check. Sutures/Staples/Tape will be removed at that time. Patient may shower after post-op day #3 (10/19/18). The dressing is to be removed on post op day #9 (10/25/18). IF THE DRESSING BECOMES SOILED BEFORE THE REMOVAL DATE, CHANGE WITH A SIMILAR DRESSING. IF THE DRESSING BECOMES STAINED WITH DISCHARGE, CONTACT THE OFFICE FOR FURTHER DIRECTIONS. The patient will contact the office if the wound becomes red, has increasing pain, develops bleeding or discharge, an injury occurs, or has other concerns. The patient will continue PT consistent with total knee replacement. The patient will continue LOVENOX/COUMADIN until INR is within therapeutic range, then patient will resume normal home dose of COUMADIN for blood clot prevention. The patient will take OXYCODONE AND TYLENOL for pain control and titrate according to prescription and patient needs. The patient will take Senna-S while taking oxycodone to prevent narcotic associated constipation.  Additionally, increase water intake (drink at least 8 glasses of water daily) and try adding fiber to the diet by eating fruits, vegetables and foods that are rich in grains. If constipation is experienced, contact the medical/primary care provider to discuss further treatment options. The patient is FULL weight bearing. Elevation of the lower leg is recommended to reduce swelling. The patient will be seen in the office in 3 weeks for wound check. Tape will be removed at that time. Patient may shower after post-op day #3 (10/19/18). The dressing is to be removed on post op day #9 (10/25/18). IF THE DRESSING BECOMES SOILED BEFORE THE REMOVAL DATE, CHANGE WITH A SIMILAR DRESSING. IF THE DRESSING BECOMES STAINED WITH DISCHARGE, CONTACT THE OFFICE FOR FURTHER DIRECTIONS. The patient will contact the office if the wound becomes red, has increasing pain, develops bleeding or discharge, an injury occurs, or has other concerns. The patient will continue PT consistent with total knee replacement. The patient will continue LOVENOX/COUMADIN until INR is within therapeutic range, then patient will resume normal home dose of COUMADIN for blood clot prevention. The patient will take OXYCODONE AND TYLENOL for pain control and titrate according to prescription and patient needs. The patient will take Senna-S while taking oxycodone to prevent narcotic associated constipation.  Additionally, increase water intake (drink at least 8 glasses of water daily) and try adding fiber to the diet by eating fruits, vegetables and foods that are rich in grains. If constipation is experienced, contact the medical/primary care provider to discuss further treatment options. The patient is FULL weight bearing. Elevation of the lower leg is recommended to reduce swelling. The patient will be seen in the office in 3 weeks for wound check. Tape will be removed at that time. Patient may shower after post-op day #3 (10/19/18). The dressing is to be removed on post op day #9 (10/25/18). IF THE DRESSING BECOMES SOILED BEFORE THE REMOVAL DATE, CHANGE WITH A SIMILAR DRESSING. IF THE DRESSING BECOMES STAINED WITH DISCHARGE, CONTACT THE OFFICE FOR FURTHER DIRECTIONS. The patient will contact the office if the wound becomes red, has increasing pain, develops bleeding or discharge, an injury occurs, or has other concerns. The patient will continue PT consistent with total knee replacement. The patient will resume normal home dose of COUMADIN for blood clot prevention. The patient will take OXYCODONE AND TYLENOL for pain control and titrate according to prescription and patient needs. The patient will take Senna-S while taking oxycodone to prevent narcotic associated constipation.  Additionally, increase water intake (drink at least 8 glasses of water daily) and try adding fiber to the diet by eating fruits, vegetables and foods that are rich in grains. If constipation is experienced, contact the medical/primary care provider to discuss further treatment options. The patient is FULL weight bearing. Elevation of the lower leg is recommended to reduce swelling.

## 2018-10-16 NOTE — PROVIDER CONTACT NOTE (OTHER) - BACKGROUND
injured LLE was healing then legs swell re rupturing the lesion as per patient  under care of Dr Mayers weekly

## 2018-10-16 NOTE — PHYSICAL THERAPY INITIAL EVALUATION ADULT - ADDITIONAL COMMENTS
Pt lives in a private home with her daughter. (Pt's daughter works days so pt will be alone for approx 8 hours a day) No steps to navigate. Pt ambulated independently PTA with a rollator however, reports h/o falls and even had a near fall in am prior to surgery. Pt also needs assist with ADLs PTA. Pt owns a rollator and tub bench.

## 2018-10-16 NOTE — PROGRESS NOTE ADULT - SUBJECTIVE AND OBJECTIVE BOX
Orthopedic PA Postop Note  Patient S/P LEFT TKA  Patient in bed comfortable   LEFT Leg  Dressing C/D/I - ACE wrap without staining  DP Pulse intact   Calf Soft NT  Dorsi/Plantar Flexion/EHL/FHL intact   Sensation intact to light touch    Vital Signs Last 24 Hrs  T(C): 36.6 (16 Oct 2018 17:55), Max: 37.2 (16 Oct 2018 10:57)  T(F): 97.8 (16 Oct 2018 17:55), Max: 99 (16 Oct 2018 10:57)  HR: 60 (16 Oct 2018 17:55) (60 - 65)  BP: 124/71 (16 Oct 2018 17:55) (124/71 - 152/67)  BP(mean): --  RR: 18 (16 Oct 2018 17:55) (14 - 18)  SpO2: 92% (16 Oct 2018 17:55) (92% - 100%)    < from: Xray Knee 1 or 2 Views, Left (10.16.18 @ 16:10) >   EXAM:  KNEE-LEFT                          PROCEDURE DATE:  10/16/2018          INTERPRETATION:  CLINICAL INFORMATION: Postoperative.    TECHNIQUE: 2 views of the left knee were obtained.   COMPARISON: May 16, 2014.    FINDINGS:     BONES: Left total knee arthroplasty with cemented tibial component and   patellar resurfacing.  SOFT TISSUES: Pectus subacute postoperative changes include soft tissue   swelling, subcutaneous emphysema, small joint within effusion, containing   air.    IMPRESSION:    Postsurgical changes in the left knee.                FELICITAS DAILEY M.D., ATTENDING RADIOLOGIST  This document has been electronically signed. Oct 16 2018  5:43PM    < end of copied text >      A/P: 84F S/P LEFT TKA  1. DVTP - LOVENOX -> COUMADIN  2. Physical Therapy   3. Pain Control as clinically indicated

## 2018-10-16 NOTE — DISCHARGE NOTE ADULT - HOSPITAL COURSE
The patient underwent a LEFT TOTAL KNEE REPLACEMENT on 10/16/18. The patient received antibiotics consistent with SCIP guidelines. The patient underwent the procedure and had no intra-operative complications. Post-operatively, the patient was seen by medicine and PT. The patient received LOVENOX/COUMADIN for DVTP. The patient received pain medications per orthopedic pain managment protocol and the pain was appropriately controlled. The patient did not have any post-operative medical complications. The patient was discharged in stable condition.

## 2018-10-16 NOTE — BRIEF OPERATIVE NOTE - PROCEDURE
<<-----Click on this checkbox to enter Procedure TKA (total knee arthroplasty)  10/16/2018    Active  MNETT

## 2018-10-16 NOTE — PHYSICAL THERAPY INITIAL EVALUATION ADULT - GENERAL OBSERVATIONS, REHAB EVAL
Pt received supine in bed + telemetry + IV + Venous Compression Boots, c/o 7/10 pain, pain meds received from RN prior to PT, pt agreeable to PT

## 2018-10-16 NOTE — CONSULT NOTE ADULT - SUBJECTIVE AND OBJECTIVE BOX
83 y/o female with multiple comorbidities present with c/o left  knee pain unrelieved with conservative measures.   She is s/p right TKR and doing well with it. She ambulates with walker and now schedule for LEft TKR.     INTERVAL HPI/OVERNIGHT EVENTS:    REVIEW OF SYSTEMS:    CONSTITUTIONAL: No fever, weight loss, or fatigue  RESPIRATORY: No cough, wheezing, hemoptysis; No shortness of breath  CARDIOVASCULAR: No chest pain, palpitations  GASTROINTESTINAL: No abdominal or epigastric pain. No nausea, vomiting  NEUROLOGICAL: No headaches,  loss of strength.  MISCELLANEOUS: No joint swelling or pain            Allergies:  	Morphine Sulfate: Drug, Other, "I go crazy"  	codeine: Drug, Other, "I go crazy"  	codeine: Drug, Unknown  	morphine: Drug, Unknown    Home Medications:   * Patient Currently Takes Medications as of 27-Sep-2018 11:25 documented in Structured Notes  · 	docusate sodium 100 mg oral capsule: Last Dose Taken:  , 1 cap(s) orally 3 times a day  · 	furosemide: Last Dose Taken:  , 20 milligram(s) orally once a day  · 	gabapentin 300 mg oral capsule: Last Dose Taken:  , 1 cap(s) orally 2 times a day  · 	omeprazole 20 mg oral delayed release capsule: Last Dose Taken:  , 1 cap(s) orally once a day  · 	BUDESONIDE 0.25 MG/2 ML SUSP: Last Dose Taken:  , 1  inhaled , As Needed  · 	atenolol 25 mg oral tablet: Last Dose Taken:  , 1 tab(s) orally once a day  · 	oxybutynin 5 mg/24 hours oral tablet, extended release: Last Dose Taken:  , 1 tab(s) orally once a day  · 	atorvastatin: Last Dose Taken:  , 20 milligram(s) orally once a day  · 	FLUoxetine 20 mg oral capsule: Last Dose Taken:  , 1 cap(s) orally once a day  · 	Zantac 150: Last Dose Taken:  , 150 milligram(s) orally 2 times a day  · 	sucralfate 1 g oral tablet: Last Dose Taken:  , 1 tab(s) orally 4 times a day (before meals and at bedtime)  · 	warfarin 4 mg oral tablet: Last Dose Taken:  , 1 tab(s) orally once a day  · 	ferrous sulfate: Last Dose Taken:  , 325 milligram(s) orally once a day  · 	Tylenol: Last Dose Taken:  , 650 milligram(s) orally every 6 hours, As Needed  · 	cyanocobalamin: Last Dose Taken:  , 2500 milligram(s) orally once a day  · 	ergocalciferol: Last Dose Taken:  , 2000 international unit(s) orally once a day       Past Medical History:  Anemia    Asthma    Atrial fibrillation    Bleeding internal hemorrhoids    CHF (congestive heart failure)    Compression fracture    COPD (chronic obstructive pulmonary disease)    DVT (deep venous thrombosis), left  in january 2015  Fall    GERD (gastroesophageal reflux disease)    HTN (hypertension)    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     Past Surgical History:  Acquired ptosis of eyelid  2014  Acute carpal tunnel syndrome  with surgery  Cardiac pacemaker    Complete tear of rotator cuff, right  1998  H/O atrioventricular eva ablation    H/O cardiac catheterization  2005 & 2007- no stents  H/O laminectomy  3/4 lumbar    2002  H/O repair of right rotator cuff    H/O total hysterectomy  1971  History of cholecystectomy    History of lumbar laminectomy  L3-4  History of total knee arthroplasty, right    Internal hemorrhoid  banded and cauterized  Presence of inferior vena cava filter    Ptosis, both eyelids    Rectal adenoma  s/p excision 2005  S/P cataract surgery, left  June 2010  S/P cataract surgery, right  May 2010  S/P cholecystectomy  1991  S/P IVC filter  placed 1/9  S/P knee replacement, right  2007  Status post Mohs surgery for basal cell carcinoma  left knee.     Family History:  No pertinent family history in first degree relatives     Mother  Still living? No  Family history of coronary artery disease, Age at diagnosis: 61-70.     Social History:  · Marital Status	  · Occupation	retired RN  · Lives With	children     Substance Use History:  · Substance Use	caffeine  denies drug use  · Caffeine Type	coffee  · Caffeine Amount/Frequency	1-2 cups/cans per day     Alcohol Use History:  · Have you ever consumed alcohol	never     Tobacco Usage:  · Tobacco Usage: Former smoker  · Tobacco Type: cigarettes  · Number of Packs per Day: 1.5  · Number of yrs: 20  · Pack yrs: 30  · Longest Period Tobacco-Free: quit 30 years ago    Vital Signs Last 24 Hrs  T(C): 36.6 (16 Oct 2018 17:55), Max: 37.2 (16 Oct 2018 10:57)  T(F): 97.8 (16 Oct 2018 17:55), Max: 99 (16 Oct 2018 10:57)  HR: 60 (16 Oct 2018 17:55) (60 - 65)  BP: 124/71 (16 Oct 2018 17:55) (124/71 - 152/67)  BP(mean): --  RR: 18 (16 Oct 2018 17:55) (14 - 18)  SpO2: 92% (16 Oct 2018 17:55) (92% - 100%)    PHYSICAL EXAM:    GENERAL: Elderly female looking comfort   HEENT: PERRL, +EOMI  NECK: soft, Supple, No JVD,   CHEST/LUNG: Clear to auscultate bilaterally; No wheezing  HEART: S1S2+, Regular rate and rhythm; No murmurs, rubs, or gallops  ABDOMEN: Soft, Nontender, Nondistended; Bowel sounds present  EXTREMITIES:  2+ Peripheral Pulses, No clubbing, cyanosis, or edema  SKIN: No rashes or lesions  NEURO: AAOX3, no focal deficits, no motor r sensory loss  PSYCH: normal mood      LABS:          PT/INR - ( 16 Oct 2018 11:05 )   PT: 18.4 sec;   INR: 1.66 ratio         PTT - ( 16 Oct 2018 11:05 )  PTT:37.9 sec        I&O's Summary    16 Oct 2018 07:01  -  16 Oct 2018 18:02  --------------------------------------------------------  IN: 85 mL / OUT: 0 mL / NET: 85 mL        MEDICATIONS  (STANDING):  acetaminophen  IVPB .. 1000 milliGRAM(s) IV Intermittent <User Schedule>  ATENolol  Tablet 25 milliGRAM(s) Oral daily  atorvastatin Oral Tab/Cap - Peds 20 milliGRAM(s) Oral daily  ceFAZolin   IVPB 2000 milliGRAM(s) IV Intermittent <User Schedule>  ferrous    sulfate 325 milliGRAM(s) Oral daily  FLUoxetine 20 milliGRAM(s) Oral daily  furosemide    Tablet 20 milliGRAM(s) Oral daily  gabapentin 300 milliGRAM(s) Oral two times a day  lactated ringers. 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       REVIEW OF SYSTEMS:    CONSTITUTIONAL: No fever, some fatigue  RESPIRATORY: No cough, No shortness of breath  CARDIOVASCULAR: No chest pain, palpitations  GASTROINTESTINAL: No abdominal, No nausea, vomiting  NEUROLOGICAL: No headaches,  loss of strength.  MISCELLANEOUS: Left knee pain.            Allergies:  	Morphine Sulfate: Drug, Other, "I go crazy"  	codeine: Drug, Other, "I go crazy"  	codeine: Drug, Unknown  	morphine: Drug, Unknown    Home Medications:   * Patient Currently Takes Medications as of 27-Sep-2018 11:25 documented in Structured Notes  · 	docusate sodium 100 mg oral capsule: Last Dose Taken:  , 1 cap(s) orally 3 times a day  · 	furosemide: Last Dose Taken:  , 20 milligram(s) orally once a day  · 	gabapentin 300 mg oral capsule: Last Dose Taken:  , 1 cap(s) orally 2 times a day  · 	omeprazole 20 mg oral delayed release capsule: Last Dose Taken:  , 1 cap(s) orally once a day  · 	BUDESONIDE 0.25 MG/2 ML SUSP: Last Dose Taken:  , 1  inhaled , As Needed  · 	atenolol 25 mg oral tablet: Last Dose Taken:  , 1 tab(s) orally once a day  · 	oxybutynin 5 mg/24 hours oral tablet, extended release: Last Dose Taken:  , 1 tab(s) orally once a day  · 	atorvastatin: Last Dose Taken:  , 20 milligram(s) orally once a day  · 	FLUoxetine 20 mg oral capsule: Last Dose Taken:  , 1 cap(s) orally once a day  · 	Zantac 150: Last Dose Taken:  , 150 milligram(s) orally 2 times a day  · 	sucralfate 1 g oral tablet: Last Dose Taken:  , 1 tab(s) orally 4 times a day (before meals and at bedtime)  · 	warfarin 4 mg oral tablet: Last Dose Taken:  , 1 tab(s) orally once a day  · 	ferrous sulfate: Last Dose Taken:  , 325 milligram(s) orally once a day  · 	Tylenol: Last Dose Taken:  , 650 milligram(s) orally every 6 hours, As Needed  · 	cyanocobalamin: Last Dose Taken:  , 2500 milligram(s) orally once a day  · 	ergocalciferol: Last Dose Taken:  , 2000 international unit(s) orally once a day       Past Medical History:  Anemia    Asthma    Atrial fibrillation    Bleeding internal hemorrhoids    CHF (congestive heart failure)    Compression fracture    COPD (chronic obstructive pulmonary disease)    DVT (deep venous thrombosis), left  in january 2015  Fall    GERD (gastroesophageal reflux disease)    HTN (hypertension)    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     Past Surgical History:  Acquired ptosis of eyelid  2014  Acute carpal tunnel syndrome  with surgery  Cardiac pacemaker    Complete tear of rotator cuff, right  1998  H/O atrioventricular eva ablation    H/O cardiac catheterization  2005 & 2007- no stents  H/O laminectomy  3/4 lumbar    2002  H/O repair of right rotator cuff    H/O total hysterectomy  1971  History of cholecystectomy    History of lumbar laminectomy  L3-4  History of total knee arthroplasty, right    Internal hemorrhoid  banded and cauterized  Presence of inferior vena cava filter    Ptosis, both eyelids    Rectal adenoma  s/p excision 2005  S/P cataract surgery, left  June 2010  S/P cataract surgery, right  May 2010  S/P cholecystectomy  1991  S/P IVC filter  placed 1/9  S/P knee replacement, right  2007  Status post Mohs surgery for basal cell carcinoma  left knee.     Family History:  No pertinent family history in first degree relatives     Mother  Still living? No  Family history of coronary artery disease, Age at diagnosis: 61-70.     Social History:  · Marital Status	  · Occupation	retired RN  · Lives With	children     Substance Use History:  · Substance Use	caffeine  denies drug use  · Caffeine Type	coffee  · Caffeine Amount/Frequency	1-2 cups/cans per day     Alcohol Use History:  · Have you ever consumed alcohol	never     Tobacco Usage:  · Tobacco Usage: Former smoker  · Tobacco Type: cigarettes  · Number of Packs per Day: 1.5  · Number of yrs: 20  · Pack yrs: 30  · Longest Period Tobacco-Free: quit 30 years ago    Vital Signs Last 24 Hrs  T(C): 36.6 (16 Oct 2018 17:55), Max: 37.2 (16 Oct 2018 10:57)  T(F): 97.8 (16 Oct 2018 17:55), Max: 99 (16 Oct 2018 10:57)  HR: 60 (16 Oct 2018 17:55) (60 - 65)  BP: 124/71 (16 Oct 2018 17:55) (124/71 - 152/67)  BP(mean): --  RR: 18 (16 Oct 2018 17:55) (14 - 18)  SpO2: 92% (16 Oct 2018 17:55) (92% - 100%)    PHYSICAL EXAM:    GENERAL: Elderly female looking comfort   HEENT: PERRL, +EOMI  NECK: soft, Supple, No JVD,   CHEST/LUNG: Clear to auscultate bilaterally; No wheezing  HEART: S1S2+, Regular rate and rhythm; No murmurs, rubs, or gallops  ABDOMEN: Soft, Nontender, Nondistended; Bowel sounds present  EXTREMITIES:  2+ Peripheral Pulses, No edema, left knee surgical wound with dressings on, no bleeding or soaking.   SKIN: No rashes or lesions  NEURO: AAOX3, no focal deficits, no motor r sensory loss  PSYCH: normal mood      LABS:          PT/INR - ( 16 Oct 2018 11:05 )   PT: 18.4 sec;   INR: 1.66 ratio         PTT - ( 16 Oct 2018 11:05 )  PTT:37.9 sec

## 2018-10-17 LAB
ANION GAP SERPL CALC-SCNC: 10 MMOL/L — SIGNIFICANT CHANGE UP (ref 5–17)
APTT BLD: 35.8 SEC — SIGNIFICANT CHANGE UP (ref 27.5–37.4)
BUN SERPL-MCNC: 30 MG/DL — HIGH (ref 8–20)
CALCIUM SERPL-MCNC: 9.1 MG/DL — SIGNIFICANT CHANGE UP (ref 8.6–10.2)
CHLORIDE SERPL-SCNC: 101 MMOL/L — SIGNIFICANT CHANGE UP (ref 98–107)
CO2 SERPL-SCNC: 29 MMOL/L — SIGNIFICANT CHANGE UP (ref 22–29)
CREAT SERPL-MCNC: 0.97 MG/DL — SIGNIFICANT CHANGE UP (ref 0.5–1.3)
GLUCOSE SERPL-MCNC: 99 MG/DL — SIGNIFICANT CHANGE UP (ref 70–115)
HCT VFR BLD CALC: 31 % — LOW (ref 37–47)
HGB BLD-MCNC: 9.6 G/DL — LOW (ref 12–16)
INR BLD: 1.45 RATIO — HIGH (ref 0.88–1.16)
MCHC RBC-ENTMCNC: 31 G/DL — LOW (ref 32–36)
MCHC RBC-ENTMCNC: 32.7 PG — HIGH (ref 27–31)
MCV RBC AUTO: 105.4 FL — HIGH (ref 81–99)
PLATELET # BLD AUTO: 183 K/UL — SIGNIFICANT CHANGE UP (ref 150–400)
POTASSIUM SERPL-MCNC: 5 MMOL/L — SIGNIFICANT CHANGE UP (ref 3.5–5.3)
POTASSIUM SERPL-SCNC: 5 MMOL/L — SIGNIFICANT CHANGE UP (ref 3.5–5.3)
PROTHROM AB SERPL-ACNC: 16.1 SEC — HIGH (ref 9.8–12.7)
RBC # BLD: 2.94 M/UL — LOW (ref 4.4–5.2)
RBC # FLD: 18 % — HIGH (ref 11–15.6)
SODIUM SERPL-SCNC: 140 MMOL/L — SIGNIFICANT CHANGE UP (ref 135–145)
WBC # BLD: 6.4 K/UL — SIGNIFICANT CHANGE UP (ref 4.8–10.8)
WBC # FLD AUTO: 6.4 K/UL — SIGNIFICANT CHANGE UP (ref 4.8–10.8)

## 2018-10-17 PROCEDURE — 99232 SBSQ HOSP IP/OBS MODERATE 35: CPT

## 2018-10-17 RX ORDER — SODIUM CHLORIDE 9 MG/ML
1000 INJECTION, SOLUTION INTRAVENOUS
Qty: 0 | Refills: 0 | Status: DISCONTINUED | OUTPATIENT
Start: 2018-10-17 | End: 2018-10-17

## 2018-10-17 RX ORDER — ACETAMINOPHEN 500 MG
975 TABLET ORAL EVERY 8 HOURS
Qty: 0 | Refills: 0 | Status: DISCONTINUED | OUTPATIENT
Start: 2018-10-17 | End: 2018-10-19

## 2018-10-17 RX ORDER — WARFARIN SODIUM 2.5 MG/1
5 TABLET ORAL AT BEDTIME
Qty: 0 | Refills: 0 | Status: DISCONTINUED | OUTPATIENT
Start: 2018-10-17 | End: 2018-10-19

## 2018-10-17 RX ORDER — WARFARIN SODIUM 2.5 MG/1
4 TABLET ORAL ONCE
Qty: 0 | Refills: 0 | Status: DISCONTINUED | OUTPATIENT
Start: 2018-10-17 | End: 2018-10-17

## 2018-10-17 RX ORDER — ATORVASTATIN CALCIUM 80 MG/1
20 TABLET, FILM COATED ORAL DAILY
Qty: 0 | Refills: 0 | Status: DISCONTINUED | OUTPATIENT
Start: 2018-10-17 | End: 2018-10-19

## 2018-10-17 RX ADMIN — Medication 975 MILLIGRAM(S): at 22:30

## 2018-10-17 RX ADMIN — Medication 100 MILLIGRAM(S): at 21:47

## 2018-10-17 RX ADMIN — Medication 20 MILLIGRAM(S): at 12:30

## 2018-10-17 RX ADMIN — SODIUM CHLORIDE 3 MILLILITER(S): 9 INJECTION INTRAMUSCULAR; INTRAVENOUS; SUBCUTANEOUS at 12:37

## 2018-10-17 RX ADMIN — Medication 20 MILLIGRAM(S): at 12:31

## 2018-10-17 RX ADMIN — SODIUM CHLORIDE 3 MILLILITER(S): 9 INJECTION INTRAMUSCULAR; INTRAVENOUS; SUBCUTANEOUS at 06:29

## 2018-10-17 RX ADMIN — Medication 975 MILLIGRAM(S): at 21:47

## 2018-10-17 RX ADMIN — WARFARIN SODIUM 5 MILLIGRAM(S): 2.5 TABLET ORAL at 21:47

## 2018-10-17 RX ADMIN — POLYETHYLENE GLYCOL 3350 17 GRAM(S): 17 POWDER, FOR SOLUTION ORAL at 17:32

## 2018-10-17 RX ADMIN — ENOXAPARIN SODIUM 30 MILLIGRAM(S): 100 INJECTION SUBCUTANEOUS at 17:28

## 2018-10-17 RX ADMIN — ENOXAPARIN SODIUM 30 MILLIGRAM(S): 100 INJECTION SUBCUTANEOUS at 06:22

## 2018-10-17 RX ADMIN — GABAPENTIN 300 MILLIGRAM(S): 400 CAPSULE ORAL at 06:22

## 2018-10-17 RX ADMIN — Medication 100 MILLIGRAM(S): at 06:16

## 2018-10-17 RX ADMIN — Medication 325 MILLIGRAM(S): at 12:30

## 2018-10-17 RX ADMIN — Medication 5 MILLIGRAM(S): at 17:29

## 2018-10-17 RX ADMIN — Medication 15 MILLIGRAM(S): at 12:32

## 2018-10-17 RX ADMIN — Medication 15 MILLIGRAM(S): at 12:45

## 2018-10-17 RX ADMIN — Medication 100 MILLIGRAM(S): at 06:15

## 2018-10-17 RX ADMIN — SODIUM CHLORIDE 3 MILLILITER(S): 9 INJECTION INTRAMUSCULAR; INTRAVENOUS; SUBCUTANEOUS at 21:50

## 2018-10-17 RX ADMIN — ATORVASTATIN CALCIUM 20 MILLIGRAM(S): 80 TABLET, FILM COATED ORAL at 21:47

## 2018-10-17 RX ADMIN — GABAPENTIN 300 MILLIGRAM(S): 400 CAPSULE ORAL at 17:32

## 2018-10-17 RX ADMIN — PANTOPRAZOLE SODIUM 40 MILLIGRAM(S): 20 TABLET, DELAYED RELEASE ORAL at 06:23

## 2018-10-17 RX ADMIN — Medication 100 MILLIGRAM(S): at 12:29

## 2018-10-17 NOTE — PROGRESS NOTE ADULT - ASSESSMENT
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       HTN: Will continue with atenolol 25 mg a day, will Monitor BP.     Hx of DVT and PE: As per Ortho will continue with Lovenox 30mg BID and is being resumed on coumadin, will monitor CBC and PT.      HLD: Continue with atorvastatin: Last Dose Taken: 20 milligram(s) orally once a day..     Hx of CHF: Patient is Euvolemic will continue with her Lasix 20mg daily, d/c IV fluids.      Neuropathy: Gabapentin 300 mg 2 times a day    Depression: will continue with FLUoxetine 20 mg once a day.

## 2018-10-17 NOTE — OCCUPATIONAL THERAPY INITIAL EVALUATION ADULT - ADDITIONAL COMMENTS
Pt has shower chair, w/c, electric cart-Akila, raised commode, grab bars, reacher, and rollator at home.

## 2018-10-17 NOTE — PROGRESS NOTE ADULT - SUBJECTIVE AND OBJECTIVE BOX
YOSELYN PAULSON    [id number]    84y      Female    Patient is a 84y old  Female who presents with a chief complaint of left TKR (16 Oct 2018 19:22)      INTERVAL HPI/OVERNIGHT EVENTS:    Patient pain is well controlled, working well with PT, denies dizziness, fever, chills, chest pain, nausea, vomiting    REVIEW OF SYSTEMS:    CONSTITUTIONAL: No fever, some fatigue  RESPIRATORY: No cough, No shortness of breath  CARDIOVASCULAR: No chest pain, palpitations  GASTROINTESTINAL: No abdominal, No nausea, vomiting  NEUROLOGICAL: No headaches,  loss of strength.  MISCELLANEOUS: Left knee pain well controlled with pain meds       Vital Signs Last 24 Hrs  T(C): 36.1 (17 Oct 2018 09:00), Max: 37.2 (16 Oct 2018 10:57)  T(F): 96.9 (17 Oct 2018 09:00), Max: 99 (16 Oct 2018 10:57)  HR: 60 (17 Oct 2018 09:00) (59 - 65)  BP: 107/61 (17 Oct 2018 09:00) (84/53 - 152/67)  RR: 19 (17 Oct 2018 09:00) (14 - 20)  SpO2: 97% (17 Oct 2018 09:00) (92% - 100%)    PHYSICAL EXAM:    GENERAL: Elderly female looking comfort   HEENT: PERRL, +EOMI  NECK: soft, Supple, No JVD,   CHEST/LUNG: Clear to auscultate bilaterally; No wheezing  HEART: S1S2+, Regular rate and rhythm; No murmurs, rubs, or gallops  ABDOMEN: Soft, Nontender, Nondistended; Bowel sounds present  EXTREMITIES:  2+ Peripheral Pulses, No edema, left knee surgical wound with dressings on, no bleeding or soaking.   SKIN: No rashes or lesions  NEURO: AAOX3, no focal deficits, no motor r sensory loss  PSYCH: normal mood      LABS:                        9.6    6.4   )-----------( 183      ( 17 Oct 2018 08:33 )             31.0     10-17    140  |  101  |  30.0<H>  ----------------------------<  99  5.0   |  29.0  |  0.97    Ca    9.1      17 Oct 2018 08:33      PT/INR - ( 17 Oct 2018 08:33 )   PT: 16.1 sec;   INR: 1.45 ratio         PTT - ( 17 Oct 2018 08:33 )  PTT:35.8 sec        I&O's Summary    16 Oct 2018 07:01  -  17 Oct 2018 07:00  --------------------------------------------------------  IN: 85 mL / OUT: 0 mL / NET: 85 mL    17 Oct 2018 07:01  -  17 Oct 2018 10:42  --------------------------------------------------------  IN: 0 mL / OUT: 400 mL / NET: -400 mL        MEDICATIONS  (STANDING):  ATENolol  Tablet 25 milliGRAM(s) Oral daily  atorvastatin 20 milliGRAM(s) Oral daily  docusate sodium 100 milliGRAM(s) Oral three times a day  enoxaparin Injectable 30 milliGRAM(s) SubCutaneous two times a day  ferrous    sulfate 325 milliGRAM(s) Oral daily  FLUoxetine 20 milliGRAM(s) Oral daily  furosemide    Tablet 20 milliGRAM(s) Oral daily  gabapentin 300 milliGRAM(s) Oral two times a day  ketorolac   Injectable 15 milliGRAM(s) IV Push once  oxybutynin XL 5 milliGRAM(s) Oral daily  pantoprazole    Tablet 40 milliGRAM(s) Oral before breakfast  polyethylene glycol 3350 17 Gram(s) Oral daily  ranitidine  Oral Tab/Cap - Peds 150 milliGRAM(s) Oral two times a day  sodium chloride 0.9% lock flush 3 milliLiter(s) IV Push every 8 hours  warfarin 4 milliGRAM(s) Oral once    MEDICATIONS  (PRN):  aluminum hydroxide/magnesium hydroxide/simethicone Suspension 30 milliLiter(s) Oral four times a day PRN Indigestion  buDESOnide   0.25 milliGRAM(s) Respule 0.25 milliGRAM(s) Inhalation daily PRN bronchospasm  HYDROmorphone  Injectable 0.5 milliGRAM(s) IV Push every 3 hours PRN breakthrough pain  magnesium hydroxide Suspension 30 milliLiter(s) Oral daily PRN Constipation  ondansetron Injectable 4 milliGRAM(s) IV Push every 6 hours PRN Nausea and/or Vomiting  oxyCODONE    IR 5 milliGRAM(s) Oral every 3 hours PRN Mild Pain (1 - 3)  oxyCODONE    IR 10 milliGRAM(s) Oral every 3 hours PRN Moderate Pain (4 - 6)  senna 2 Tablet(s) Oral at bedtime PRN Constipation

## 2018-10-17 NOTE — PROGRESS NOTE ADULT - SUBJECTIVE AND OBJECTIVE BOX
YOSELYN PAULSON  [id number]    History: Patient seen and eval at bedside. Patient is doing well and is comfortable however states she has not urinated yet after surgery. Patient does feel like she needs to urinate and would like to get up to a commode and try. The patient's pain is controlled using the prescribed pain medications. The patient is participating in physical therapy. Denies nausea, vomiting, chest pain, shortness of breath, abdominal pain or fever.     T(C): 36.4 (10-17-18 @ 05:18), Max: 37.2 (10-16-18 @ 10:57)  HR: 60 (10-17-18 @ 05:18) (59 - 65)  BP: 90/52 (10-17-18 @ 05:18) (84/53 - 152/67)  RR: 20 (10-17-18 @ 05:18) (14 - 20)  SpO2: 97% (10-17-18 @ 05:18) (92% - 100%)    PE: NAD, alert, awake  Left LE:   Knee dressing C/D/I, no drainage, no bleeding  EHL/TA/FHL/GS intact, DP pulse 2+  Gross sensation to LT intact distally, Calf soft, NT B/L    labs pending    Primary Orthopedic Assessment:  •s/p LEFT total knee replacement POD#1    Plan:   ·	DVT prophylaxis as prescribed - Lov bridge to coumadin, including use of compression devices and ankle pumps  ·	Continue physical therapy: Weightbearing as tolerated of the left lower extremity with assistance of a walker  ·	F/u labs  ·	Will have patient attempt to void this a.m., if unsuccessful will need placement of adrian catheter  ·	Incentive spirometry encouraged  ·	Pain control as clinically indicated  ·	Discharge planning: home vs rehab pending PT eval.

## 2018-10-18 LAB
ANION GAP SERPL CALC-SCNC: 9 MMOL/L — SIGNIFICANT CHANGE UP (ref 5–17)
BUN SERPL-MCNC: 28 MG/DL — HIGH (ref 8–20)
CALCIUM SERPL-MCNC: 9.1 MG/DL — SIGNIFICANT CHANGE UP (ref 8.6–10.2)
CHLORIDE SERPL-SCNC: 102 MMOL/L — SIGNIFICANT CHANGE UP (ref 98–107)
CO2 SERPL-SCNC: 28 MMOL/L — SIGNIFICANT CHANGE UP (ref 22–29)
CREAT SERPL-MCNC: 0.97 MG/DL — SIGNIFICANT CHANGE UP (ref 0.5–1.3)
GLUCOSE SERPL-MCNC: 103 MG/DL — SIGNIFICANT CHANGE UP (ref 70–115)
HCT VFR BLD CALC: 28 % — LOW (ref 37–47)
HGB BLD-MCNC: 8.8 G/DL — LOW (ref 12–16)
INR BLD: 1.91 RATIO — HIGH (ref 0.88–1.16)
MCHC RBC-ENTMCNC: 31.4 G/DL — LOW (ref 32–36)
MCHC RBC-ENTMCNC: 33 PG — HIGH (ref 27–31)
MCV RBC AUTO: 104.9 FL — HIGH (ref 81–99)
PLATELET # BLD AUTO: 150 K/UL — SIGNIFICANT CHANGE UP (ref 150–400)
POTASSIUM SERPL-MCNC: 4.8 MMOL/L — SIGNIFICANT CHANGE UP (ref 3.5–5.3)
POTASSIUM SERPL-SCNC: 4.8 MMOL/L — SIGNIFICANT CHANGE UP (ref 3.5–5.3)
PROTHROM AB SERPL-ACNC: 21.3 SEC — HIGH (ref 9.8–12.7)
RBC # BLD: 2.67 M/UL — LOW (ref 4.4–5.2)
RBC # FLD: 17.4 % — HIGH (ref 11–15.6)
SODIUM SERPL-SCNC: 139 MMOL/L — SIGNIFICANT CHANGE UP (ref 135–145)
WBC # BLD: 5 K/UL — SIGNIFICANT CHANGE UP (ref 4.8–10.8)
WBC # FLD AUTO: 5 K/UL — SIGNIFICANT CHANGE UP (ref 4.8–10.8)

## 2018-10-18 PROCEDURE — 99232 SBSQ HOSP IP/OBS MODERATE 35: CPT

## 2018-10-18 RX ORDER — ENOXAPARIN SODIUM 100 MG/ML
30 INJECTION SUBCUTANEOUS
Qty: 14 | Refills: 0 | OUTPATIENT
Start: 2018-10-18 | End: 2018-10-24

## 2018-10-18 RX ORDER — CELECOXIB 200 MG/1
1 CAPSULE ORAL
Qty: 0 | Refills: 0 | COMMUNITY
Start: 2018-10-18

## 2018-10-18 RX ORDER — MAGNESIUM HYDROXIDE 400 MG/1
30 TABLET, CHEWABLE ORAL
Qty: 0 | Refills: 0 | COMMUNITY
Start: 2018-10-18

## 2018-10-18 RX ORDER — WARFARIN SODIUM 2.5 MG/1
1 TABLET ORAL
Qty: 7 | Refills: 0 | OUTPATIENT
Start: 2018-10-18 | End: 2018-10-24

## 2018-10-18 RX ORDER — WARFARIN SODIUM 2.5 MG/1
1 TABLET ORAL
Qty: 0 | Refills: 0 | COMMUNITY

## 2018-10-18 RX ORDER — SODIUM CHLORIDE 9 MG/ML
1000 INJECTION INTRAMUSCULAR; INTRAVENOUS; SUBCUTANEOUS
Qty: 0 | Refills: 0 | Status: DISCONTINUED | OUTPATIENT
Start: 2018-10-18 | End: 2018-10-19

## 2018-10-18 RX ORDER — SODIUM CHLORIDE 9 MG/ML
250 INJECTION INTRAMUSCULAR; INTRAVENOUS; SUBCUTANEOUS ONCE
Qty: 0 | Refills: 0 | Status: DISCONTINUED | OUTPATIENT
Start: 2018-10-18 | End: 2018-10-19

## 2018-10-18 RX ORDER — SENNOSIDES/DOCUSATE SODIUM 8.6MG-50MG
1 TABLET ORAL
Qty: 60 | Refills: 0 | OUTPATIENT
Start: 2018-10-18 | End: 2018-11-16

## 2018-10-18 RX ORDER — OXYCODONE HYDROCHLORIDE 5 MG/1
1 TABLET ORAL
Qty: 56 | Refills: 0 | OUTPATIENT
Start: 2018-10-18 | End: 2018-10-24

## 2018-10-18 RX ORDER — SENNA PLUS 8.6 MG/1
2 TABLET ORAL
Qty: 0 | Refills: 0 | COMMUNITY
Start: 2018-10-18

## 2018-10-18 RX ORDER — OXYCODONE HYDROCHLORIDE 5 MG/1
1 TABLET ORAL
Qty: 50 | Refills: 0 | OUTPATIENT
Start: 2018-10-18 | End: 2018-10-24

## 2018-10-18 RX ORDER — SENNOSIDES/DOCUSATE SODIUM 8.6MG-50MG
1 TABLET ORAL
Qty: 10 | Refills: 0 | OUTPATIENT
Start: 2018-10-18 | End: 2018-10-27

## 2018-10-18 RX ADMIN — PANTOPRAZOLE SODIUM 40 MILLIGRAM(S): 20 TABLET, DELAYED RELEASE ORAL at 06:37

## 2018-10-18 RX ADMIN — Medication 325 MILLIGRAM(S): at 14:52

## 2018-10-18 RX ADMIN — Medication 975 MILLIGRAM(S): at 06:35

## 2018-10-18 RX ADMIN — CELECOXIB 200 MILLIGRAM(S): 200 CAPSULE ORAL at 19:18

## 2018-10-18 RX ADMIN — GABAPENTIN 300 MILLIGRAM(S): 400 CAPSULE ORAL at 06:36

## 2018-10-18 RX ADMIN — Medication 975 MILLIGRAM(S): at 22:44

## 2018-10-18 RX ADMIN — GABAPENTIN 300 MILLIGRAM(S): 400 CAPSULE ORAL at 19:17

## 2018-10-18 RX ADMIN — Medication 5 MILLIGRAM(S): at 14:51

## 2018-10-18 RX ADMIN — CELECOXIB 200 MILLIGRAM(S): 200 CAPSULE ORAL at 07:15

## 2018-10-18 RX ADMIN — Medication 20 MILLIGRAM(S): at 06:36

## 2018-10-18 RX ADMIN — Medication 100 MILLIGRAM(S): at 06:36

## 2018-10-18 RX ADMIN — Medication 975 MILLIGRAM(S): at 07:15

## 2018-10-18 RX ADMIN — Medication 975 MILLIGRAM(S): at 21:44

## 2018-10-18 RX ADMIN — CELECOXIB 200 MILLIGRAM(S): 200 CAPSULE ORAL at 06:36

## 2018-10-18 RX ADMIN — ENOXAPARIN SODIUM 30 MILLIGRAM(S): 100 INJECTION SUBCUTANEOUS at 19:18

## 2018-10-18 RX ADMIN — ATORVASTATIN CALCIUM 20 MILLIGRAM(S): 80 TABLET, FILM COATED ORAL at 21:45

## 2018-10-18 RX ADMIN — Medication 20 MILLIGRAM(S): at 14:51

## 2018-10-18 RX ADMIN — WARFARIN SODIUM 5 MILLIGRAM(S): 2.5 TABLET ORAL at 21:45

## 2018-10-18 RX ADMIN — Medication 975 MILLIGRAM(S): at 14:52

## 2018-10-18 RX ADMIN — Medication 975 MILLIGRAM(S): at 19:17

## 2018-10-18 RX ADMIN — SODIUM CHLORIDE 3 MILLILITER(S): 9 INJECTION INTRAMUSCULAR; INTRAVENOUS; SUBCUTANEOUS at 21:46

## 2018-10-18 RX ADMIN — ENOXAPARIN SODIUM 30 MILLIGRAM(S): 100 INJECTION SUBCUTANEOUS at 06:36

## 2018-10-18 NOTE — PROGRESS NOTE ADULT - SUBJECTIVE AND OBJECTIVE BOX
Pt Name: YOSELYN PAULSON    MRN: [id number]    Patient is a 85 y/o Female status post left total knee arthroplasty, POD #2. Patient seen and examined this morning. Patient is doing well and is comfortable, however she states she is nervous about going home. She is participating in physical therapy and was up out of bed yesterday, but feels unsteady on her feet.  The patient's pain is controlled using the prescribed pain medications.  Denies nausea, vomiting, chest pain, shortness of breath, abdominal pain or fever.       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  CHF (congestive heart failure)  GERD (gastroesophageal reflux disease)  COPD (chronic obstructive pulmonary disease)  HTN (hypertension)  Bleeding internal hemorrhoids  Pacemaker  Atrial fibrillation  Fall  TIA (transient ischemic attack): 2000  Status post Mohs surgery for basal cell carcinoma: left knee  Cardiac pacemaker  Presence of inferior vena cava filter  H/O atrioventricular eva ablation  Ptosis, both eyelids  Internal hemorrhoid: banded and cauterized  History of total knee arthroplasty, right  History of lumbar laminectomy: L3-4  History of cholecystectomy  H/O repair of right rotator cuff  Rectal adenoma: s/p excision 2005  S/P IVC filter: placed 1/9  Acute carpal tunnel syndrome: with surgery  H/O cardiac catheterization: 2005 &amp; 2007- no stents  Acquired ptosis of eyelid: 2014  S/P cataract surgery, left: June 2010  S/P cataract surgery, right: May 2010  S/P knee replacement, right: 2007  H/O laminectomy: 3/4 lumbar    2002  Complete tear of rotator cuff, right: 1998  S/P cholecystectomy: 1991  H/O total hysterectomy: 1971      Allergies: adhesives (Blisters)  codeine (Other)  codeine (Unknown)  morphine (Unknown)  Morphine Sulfate (Other)      Medications: acetaminophen   Tablet .. 975 milliGRAM(s) Oral every 8 hours  aluminum hydroxide/magnesium hydroxide/simethicone Suspension 30 milliLiter(s) Oral four times a day PRN  ATENolol  Tablet 25 milliGRAM(s) Oral daily  atorvastatin 20 milliGRAM(s) Oral daily  bisacodyl Suppository 10 milliGRAM(s) Rectal daily PRN  buDESOnide   0.25 milliGRAM(s) Respule 0.25 milliGRAM(s) Inhalation daily PRN  celecoxib 200 milliGRAM(s) Oral two times a day  docusate sodium 100 milliGRAM(s) Oral three times a day  enoxaparin Injectable 30 milliGRAM(s) SubCutaneous two times a day  ferrous    sulfate 325 milliGRAM(s) Oral daily  FLUoxetine 20 milliGRAM(s) Oral daily  furosemide    Tablet 20 milliGRAM(s) Oral daily  gabapentin 300 milliGRAM(s) Oral two times a day  HYDROmorphone  Injectable 0.5 milliGRAM(s) IV Push every 3 hours PRN  magnesium hydroxide Suspension 30 milliLiter(s) Oral daily PRN  ondansetron Injectable 4 milliGRAM(s) IV Push every 6 hours PRN  oxybutynin XL 5 milliGRAM(s) Oral daily  oxyCODONE    IR 5 milliGRAM(s) Oral every 3 hours PRN  oxyCODONE    IR 10 milliGRAM(s) Oral every 3 hours PRN  pantoprazole    Tablet 40 milliGRAM(s) Oral before breakfast  polyethylene glycol 3350 17 Gram(s) Oral daily  ranitidine  Oral Tab/Cap - Peds 150 milliGRAM(s) Oral two times a day  senna 2 Tablet(s) Oral at bedtime PRN  sodium chloride 0.9% lock flush 3 milliLiter(s) IV Push every 8 hours  warfarin 5 milliGRAM(s) Oral at bedtime        Ambulation: Walking with walker                          8.8    5.0   )-----------( 150      ( 18 Oct 2018 07:19 )             28.0     10-17    140  |  101  |  30.0<H>  ----------------------------<  99  5.0   |  29.0  |  0.97    Ca    9.1      17 Oct 2018 08:33        PHYSICAL EXAM:    Vital Signs Last 24 Hrs  T(C): 36.8 (18 Oct 2018 04:41), Max: 37.6 (18 Oct 2018 00:18)  T(F): 98.2 (18 Oct 2018 04:41), Max: 99.6 (18 Oct 2018 00:18)  HR: 60 (18 Oct 2018 04:41) (60 - 63)  BP: 97/53 (18 Oct 2018 04:41) (97/53 - 123/57)  BP(mean): --  RR: 19 (18 Oct 2018 04:41) (18 - 19)  SpO2: 99% (18 Oct 2018 04:41) (93% - 99%)  Daily     Daily     Appearance: Alert, responsive, awake in no acute distress.  Left Lower Extremity:  Knee dressing C/D/I, no drainage, no bleeding. Dressing removed, and sterile gauze+ tegaderm reapplied. ACE wrap reapplied for comfort as well. Incision healing well, no erythema, no warms, no blistering. +dorsiflexion/plantarflexion. DP 2+ B/L. Sensation is grossly intact to light touch. Calf supple NT B/L.            A/P:  Pt is a  84y Female s/p Left TKA, POD #2.     PLAN:   ·	DVT prophylaxis as prescribed - Lov bridge to coumadin, including use of compression devices and ankle pumps  ·	Continue n progression of  physical therapy: Weightbearing as tolerated of the left lower extremity with assistance of a walker  ·	Incentive spirometry encouraged  ·	Pain control as clinically indicated  ·	Discharge planning: home possibly today vs. tomorrow

## 2018-10-18 NOTE — PROGRESS NOTE ADULT - ASSESSMENT
Assessment: 83 y/o female Atrial fibrillation, Bleeding internal hemorrhoids, CHF (congestive heart failure), Compression fracture, COPD, GERD, HTN, DVT (deep venous thrombosis) IVC (inferior vena cava obstruction)  ivc - catherter - 1/9 , left  in january 2015, Fall, Pacemaker, PE (pulmonary embolism),TIA (transient ischemic attack), Vascular insufficiency, she is on coumadin, she has been having left  knee pain unrelieved with conservative measures, she is She is s/p right TKR, today came in for the elective Left TKA    Plan:     Osteoarthritis of the Left knee s/p TKR post op day 02: PT/OT/pain mgmt, DVT prophylaxis- as per ortho, Abx as per SCIP, Incentive spirometry, Prophylaxis of opioid  induced constipation, d/do IV fluids as she has no dizziness while working with PT and she is eating is drinking well.       HTN: Will continue with atenolol 25 mg a day, will Monitor BP.     Hx of DVT and PE: As per Ortho, patient is on Lovenox 30mg BID and Coumadin her INR is 1.91, she need Lovenox self injection teaching, and follow Coumadin clinic.       HLD: Continue with atorvastatin: Last Dose Taken: 20 milligram(s) orally once a day..     Hx of CHF: Patient is Euvolemic will continue with her Lasix 20mg daily, d/do IV fluids.      Neuropathy: Gabapentin 300 mg 2 times a day    Depression: will continue with FLUoxetine 20 mg once a day.     Acute blood loss anemia:, she was told to take Take iron pill daily and get her cbc check in 2 weeks.     Follow the Office for the INR check and coumadin dose adjustment.    Patient is stable from the medicine point of view to be discharged if ok with PT and Ortho team Assessment: 83 y/o female Atrial fibrillation, Bleeding internal hemorrhoids, CHF (congestive heart failure), Compression fracture, COPD, GERD, HTN, DVT (deep venous thrombosis) IVC (inferior vena cava obstruction)  ivc - catherter - 1/9 , left  in january 2015, Fall, Pacemaker, PE (pulmonary embolism),TIA (transient ischemic attack), Vascular insufficiency, she is on coumadin, she has been having left  knee pain unrelieved with conservative measures, she is She is s/p right TKR, today came in for the elective Left TKA    Plan:     Osteoarthritis of the Left knee s/p TKR post op day 02: PT/OT/pain mgmt, DVT prophylaxis- as per ortho, Abx as per SCIP, Incentive spirometry, Prophylaxis of opioid  induced constipation, d/do IV fluids as she has no dizziness while working with PT and she is eating is drinking well.       HTN: Will continue with atenolol 25 mg a day, will Monitor BP.     Hx of DVT and PE: As per Ortho, patient is on Lovenox 30mg BID and Coumadin her INR is 1.91, she need Lovenox self injection teaching, and follow Coumadin clinic.       HLD: Continue with atorvastatin: Last Dose Taken: 20 milligram(s) orally once a day..     Hx of CHF: Patient is Euvolemic will continue with her Lasix 20mg daily, d/do IV fluids.      Neuropathy: Gabapentin 300 mg 2 times a day    Depression: will continue with FLUoxetine 20 mg once a day.     Acute blood loss anemia:, she was told to take Take iron pill daily and get her cbc check in 2 weeks.     Follow the Office for the INR check and coumadin dose adjustment.    patient is noted to have low BP aqnd was feeling dizziness, will give IV fluids and will monitor bp.

## 2018-10-18 NOTE — PROGRESS NOTE ADULT - SUBJECTIVE AND OBJECTIVE BOX
YOSELYN PAULSON    [id number]    84y      Female    Patient is a 84y old  Female who presents with a chief complaint of left TKR (17 Oct 2018 10:42)      INTERVAL HPI/OVERNIGHT EVENTS:      Patient pain is well controlled, working well with PT no issues, denies dizziness, fever, chills, chest pain, nausea and vomiting.      REVIEW OF SYSTEMS:    CONSTITUTIONAL: No fever, some fatigue  RESPIRATORY: No cough, No shortness of breath  CARDIOVASCULAR: No chest pain, palpitations  GASTROINTESTINAL: No abdominal, No nausea, vomiting  NEUROLOGICAL: No headaches,  loss of strength.  MISCELLANEOUS: Left knee pain well controlled with pain meds.     Vital Signs Last 24 Hrs  T(C): 36.8 (18 Oct 2018 04:41), Max: 37.6 (18 Oct 2018 00:18)  T(F): 98.2 (18 Oct 2018 04:41), Max: 99.6 (18 Oct 2018 00:18)  HR: 60 (18 Oct 2018 04:41) (60 - 63)  BP: 97/53 (18 Oct 2018 04:41) (97/53 - 123/57)  RR: 19 (18 Oct 2018 04:41) (18 - 19)  SpO2: 99% (18 Oct 2018 04:41) (93% - 99%)    PHYSICAL EXAM:    GENERAL: Elderly female looking comfort   HEENT: PERRL, +EOMI  NECK: soft, Supple, No JVD,   CHEST/LUNG: Clear to auscultate bilaterally; No wheezing  HEART: S1S2+, Regular rate and rhythm; No murmurs, rubs, or gallops  ABDOMEN: Soft, Nontender, Nondistended; Bowel sounds present  EXTREMITIES:  2+ Peripheral Pulses, No edema, left knee surgical wound with dressings on, no bleeding or soaking.   SKIN: No rashes or lesions  NEURO: AAOX3, no focal deficits, no motor r sensory loss  PSYCH: normal mood      LABS:                        8.8    5.0   )-----------( 150      ( 18 Oct 2018 07:19 )             28.0     10-18    139  |  102  |  28.0<H>  ----------------------------<  103  4.8   |  28.0  |  0.97    Ca    9.1      18 Oct 2018 07:19      PT/INR - ( 18 Oct 2018 07:19 )   PT: 21.3 sec;   INR: 1.91 ratio         PTT - ( 17 Oct 2018 08:33 )  PTT:35.8 sec        I&O's Summary    17 Oct 2018 07:01  -  18 Oct 2018 07:00  --------------------------------------------------------  IN: 0 mL / OUT: 550 mL / NET: -550 mL        MEDICATIONS  (STANDING):  acetaminophen   Tablet .. 975 milliGRAM(s) Oral every 8 hours  ATENolol  Tablet 25 milliGRAM(s) Oral daily  atorvastatin 20 milliGRAM(s) Oral daily  celecoxib 200 milliGRAM(s) Oral two times a day  docusate sodium 100 milliGRAM(s) Oral three times a day  enoxaparin Injectable 30 milliGRAM(s) SubCutaneous two times a day  ferrous    sulfate 325 milliGRAM(s) Oral daily  FLUoxetine 20 milliGRAM(s) Oral daily  furosemide    Tablet 20 milliGRAM(s) Oral daily  gabapentin 300 milliGRAM(s) Oral two times a day  oxybutynin XL 5 milliGRAM(s) Oral daily  pantoprazole    Tablet 40 milliGRAM(s) Oral before breakfast  polyethylene glycol 3350 17 Gram(s) Oral daily  ranitidine  Oral Tab/Cap - Peds 150 milliGRAM(s) Oral two times a day  sodium chloride 0.9% lock flush 3 milliLiter(s) IV Push every 8 hours  warfarin 5 milliGRAM(s) Oral at bedtime    MEDICATIONS  (PRN):  aluminum hydroxide/magnesium hydroxide/simethicone Suspension 30 milliLiter(s) Oral four times a day PRN Indigestion  bisacodyl Suppository 10 milliGRAM(s) Rectal daily PRN If no bowel movement by postoperative day #2  buDESOnide   0.25 milliGRAM(s) Respule 0.25 milliGRAM(s) Inhalation daily PRN bronchospasm  HYDROmorphone  Injectable 0.5 milliGRAM(s) IV Push every 3 hours PRN breakthrough pain  magnesium hydroxide Suspension 30 milliLiter(s) Oral daily PRN Constipation  ondansetron Injectable 4 milliGRAM(s) IV Push every 6 hours PRN Nausea and/or Vomiting  oxyCODONE    IR 5 milliGRAM(s) Oral every 3 hours PRN Mild Pain (1 - 3)  oxyCODONE    IR 10 milliGRAM(s) Oral every 3 hours PRN Moderate Pain (4 - 6)  senna 2 Tablet(s) Oral at bedtime PRN Constipation

## 2018-10-19 VITALS
HEART RATE: 65 BPM | RESPIRATION RATE: 18 BRPM | DIASTOLIC BLOOD PRESSURE: 66 MMHG | TEMPERATURE: 98 F | SYSTOLIC BLOOD PRESSURE: 100 MMHG | OXYGEN SATURATION: 99 %

## 2018-10-19 LAB
HCT VFR BLD CALC: 32.2 % — LOW (ref 37–47)
HGB BLD-MCNC: 9.7 G/DL — LOW (ref 12–16)
INR BLD: 2.27 RATIO — HIGH (ref 0.88–1.16)
MCHC RBC-ENTMCNC: 30.1 G/DL — LOW (ref 32–36)
MCHC RBC-ENTMCNC: 32.3 PG — HIGH (ref 27–31)
MCV RBC AUTO: 107.3 FL — HIGH (ref 81–99)
PLATELET # BLD AUTO: 170 K/UL — SIGNIFICANT CHANGE UP (ref 150–400)
PROTHROM AB SERPL-ACNC: 25.4 SEC — HIGH (ref 9.8–12.7)
RBC # BLD: 3 M/UL — LOW (ref 4.4–5.2)
RBC # FLD: 17.7 % — HIGH (ref 11–15.6)
WBC # BLD: 5.1 K/UL — SIGNIFICANT CHANGE UP (ref 4.8–10.8)
WBC # FLD AUTO: 5.1 K/UL — SIGNIFICANT CHANGE UP (ref 4.8–10.8)

## 2018-10-19 PROCEDURE — 99232 SBSQ HOSP IP/OBS MODERATE 35: CPT

## 2018-10-19 RX ORDER — SENNOSIDES/DOCUSATE SODIUM 8.6MG-50MG
2 TABLET ORAL
Qty: 28 | Refills: 0 | OUTPATIENT
Start: 2018-10-19 | End: 2018-11-01

## 2018-10-19 RX ORDER — ACETAMINOPHEN 500 MG
650 TABLET ORAL
Qty: 0 | Refills: 0 | COMMUNITY

## 2018-10-19 RX ORDER — OXYCODONE HYDROCHLORIDE 5 MG/1
1 TABLET ORAL
Qty: 42 | Refills: 0 | OUTPATIENT
Start: 2018-10-19 | End: 2018-10-25

## 2018-10-19 RX ORDER — WARFARIN SODIUM 2.5 MG/1
1 TABLET ORAL
Qty: 0 | Refills: 0 | DISCHARGE
Start: 2018-10-19

## 2018-10-19 RX ORDER — ACETAMINOPHEN 500 MG
3 TABLET ORAL
Qty: 0 | Refills: 0 | COMMUNITY
Start: 2018-10-19

## 2018-10-19 RX ADMIN — SODIUM CHLORIDE 3 MILLILITER(S): 9 INJECTION INTRAMUSCULAR; INTRAVENOUS; SUBCUTANEOUS at 06:17

## 2018-10-19 RX ADMIN — PANTOPRAZOLE SODIUM 40 MILLIGRAM(S): 20 TABLET, DELAYED RELEASE ORAL at 06:10

## 2018-10-19 RX ADMIN — ENOXAPARIN SODIUM 30 MILLIGRAM(S): 100 INJECTION SUBCUTANEOUS at 06:11

## 2018-10-19 RX ADMIN — SODIUM CHLORIDE 125 MILLILITER(S): 9 INJECTION INTRAMUSCULAR; INTRAVENOUS; SUBCUTANEOUS at 03:46

## 2018-10-19 RX ADMIN — GABAPENTIN 300 MILLIGRAM(S): 400 CAPSULE ORAL at 06:10

## 2018-10-19 RX ADMIN — CELECOXIB 200 MILLIGRAM(S): 200 CAPSULE ORAL at 07:00

## 2018-10-19 RX ADMIN — Medication 20 MILLIGRAM(S): at 06:09

## 2018-10-19 RX ADMIN — CELECOXIB 200 MILLIGRAM(S): 200 CAPSULE ORAL at 06:10

## 2018-10-19 RX ADMIN — Medication 975 MILLIGRAM(S): at 06:10

## 2018-10-19 RX ADMIN — POLYETHYLENE GLYCOL 3350 17 GRAM(S): 17 POWDER, FOR SOLUTION ORAL at 11:19

## 2018-10-19 RX ADMIN — Medication 5 MILLIGRAM(S): at 11:20

## 2018-10-19 RX ADMIN — Medication 325 MILLIGRAM(S): at 11:20

## 2018-10-19 RX ADMIN — Medication 20 MILLIGRAM(S): at 11:20

## 2018-10-19 RX ADMIN — Medication 975 MILLIGRAM(S): at 07:00

## 2018-10-19 NOTE — PROGRESS NOTE ADULT - SUBJECTIVE AND OBJECTIVE BOX
Patient is doing well, she is stable for discharge from the medicine point of view pending PT and Orthopedics Clearance.     YOSELYN PAULSON    [id number]    84y      Female    Patient is a 84y old  Female who presents with a chief complaint of left TKR (18 Oct 2018 10:31)      INTERVAL HPI/OVERNIGHT EVENTS:    Patient pain is well controlled, working well with PT no issues, no more dizziness or hypotension since yesterday, denies fever, chills, chest pain, nausea and vomiting.      REVIEW OF SYSTEMS:    CONSTITUTIONAL: No fever, some fatigue  RESPIRATORY: No cough, No shortness of breath  CARDIOVASCULAR: No chest pain, palpitations  GASTROINTESTINAL: No abdominal, No nausea, vomiting  NEUROLOGICAL: No headaches,  loss of strength.  MISCELLANEOUS: Left knee pain well controlled with pain meds.       Vital Signs Last 24 Hrs  T(C): 36.5 (19 Oct 2018 08:17), Max: 36.9 (18 Oct 2018 16:06)  T(F): 97.7 (19 Oct 2018 08:17), Max: 98.5 (18 Oct 2018 20:30)  HR: 65 (19 Oct 2018 08:17) (58 - 65)  BP: 100/66 (19 Oct 2018 08:17) (86/70 - 125/65)  RR: 18 (19 Oct 2018 08:17) (18 - 18)  SpO2: 99% (19 Oct 2018 08:17) (97% - 100%)    PHYSICAL EXAM:      GENERAL: Elderly female looking comfort   HEENT: PERRL, +EOMI  NECK: soft, Supple, No JVD,   CHEST/LUNG: Clear to auscultate bilaterally; No wheezing  HEART: S1S2+, Regular rate and rhythm; No murmurs, rubs, or gallops  ABDOMEN: Soft, Nontender, Nondistended; Bowel sounds present  EXTREMITIES:  2+ Peripheral Pulses, No edema, left knee surgical wound with dressings on, no bleeding or soaking.   SKIN: No rashes or lesions  NEURO: AAOX3, no focal deficits, no motor r sensory loss  PSYCH: normal mood      LABS:                        8.8    5.0   )-----------( 150      ( 18 Oct 2018 07:19 )             28.0     10-18    139  |  102  |  28.0<H>  ----------------------------<  103  4.8   |  28.0  |  0.97    Ca    9.1      18 Oct 2018 07:19      PT/INR - ( 18 Oct 2018 07:19 )   PT: 21.3 sec;   INR: 1.91 ratio                 I&O's Summary      MEDICATIONS  (STANDING):  acetaminophen   Tablet .. 975 milliGRAM(s) Oral every 8 hours  ATENolol  Tablet 25 milliGRAM(s) Oral daily  atorvastatin 20 milliGRAM(s) Oral daily  celecoxib 200 milliGRAM(s) Oral two times a day  docusate sodium 100 milliGRAM(s) Oral three times a day  enoxaparin Injectable 30 milliGRAM(s) SubCutaneous two times a day  ferrous    sulfate 325 milliGRAM(s) Oral daily  FLUoxetine 20 milliGRAM(s) Oral daily  furosemide    Tablet 20 milliGRAM(s) Oral daily  gabapentin 300 milliGRAM(s) Oral two times a day  oxybutynin XL 5 milliGRAM(s) Oral daily  pantoprazole    Tablet 40 milliGRAM(s) Oral before breakfast  polyethylene glycol 3350 17 Gram(s) Oral daily  ranitidine  Oral Tab/Cap - Peds 150 milliGRAM(s) Oral two times a day  sodium chloride 0.9% Bolus 250 milliLiter(s) IV Bolus once  sodium chloride 0.9% lock flush 3 milliLiter(s) IV Push every 8 hours  sodium chloride 0.9%. 1000 milliLiter(s) (125 mL/Hr) IV Continuous <Continuous>  warfarin 5 milliGRAM(s) Oral at bedtime    MEDICATIONS  (PRN):  aluminum hydroxide/magnesium hydroxide/simethicone Suspension 30 milliLiter(s) Oral four times a day PRN Indigestion  bisacodyl Suppository 10 milliGRAM(s) Rectal daily PRN If no bowel movement by postoperative day #2  buDESOnide   0.25 milliGRAM(s) Respule 0.25 milliGRAM(s) Inhalation daily PRN bronchospasm  HYDROmorphone  Injectable 0.5 milliGRAM(s) IV Push every 3 hours PRN breakthrough pain  magnesium hydroxide Suspension 30 milliLiter(s) Oral daily PRN Constipation  ondansetron Injectable 4 milliGRAM(s) IV Push every 6 hours PRN Nausea and/or Vomiting  oxyCODONE    IR 5 milliGRAM(s) Oral every 3 hours PRN Mild Pain (1 - 3)  oxyCODONE    IR 10 milliGRAM(s) Oral every 3 hours PRN Moderate Pain (4 - 6)  senna 2 Tablet(s) Oral at bedtime PRN Constipation

## 2018-10-19 NOTE — PROGRESS NOTE ADULT - SUBJECTIVE AND OBJECTIVE BOX
Pt Name: YOSELYN PAULSON    MRN: [id number]      Patient is a 83 y/o Female status post left total knee arthroplasty, POD #3. Patient seen and examined this morning. Patient is doing well and is comfortable, states she is ready to go home.  The patient's pain is controlled using the prescribed pain medications.  Denies nausea, vomiting, chest pain, shortness of breath, abdominal pain or fever.     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  CHF (congestive heart failure)  GERD (gastroesophageal reflux disease)  COPD (chronic obstructive pulmonary disease)  HTN (hypertension)  Bleeding internal hemorrhoids  Pacemaker  Atrial fibrillation  Fall  TIA (transient ischemic attack): 2000  Status post Mohs surgery for basal cell carcinoma: left knee  Cardiac pacemaker  Presence of inferior vena cava filter  H/O atrioventricular eva ablation  Ptosis, both eyelids  Internal hemorrhoid: banded and cauterized  History of total knee arthroplasty, right  History of lumbar laminectomy: L3-4  History of cholecystectomy  H/O repair of right rotator cuff  Rectal adenoma: s/p excision 2005  S/P IVC filter: placed 1/9  Acute carpal tunnel syndrome: with surgery  H/O cardiac catheterization: 2005 &amp; 2007- no stents  Acquired ptosis of eyelid: 2014  S/P cataract surgery, left: June 2010  S/P cataract surgery, right: May 2010  S/P knee replacement, right: 2007  H/O laminectomy: 3/4 lumbar    2002  Complete tear of rotator cuff, right: 1998  S/P cholecystectomy: 1991  H/O total hysterectomy: 1971      Allergies: adhesives (Blisters)  codeine (Other)  codeine (Unknown)  morphine (Unknown)  Morphine Sulfate (Other)      Medications: acetaminophen   Tablet .. 975 milliGRAM(s) Oral every 8 hours  aluminum hydroxide/magnesium hydroxide/simethicone Suspension 30 milliLiter(s) Oral four times a day PRN  ATENolol  Tablet 25 milliGRAM(s) Oral daily  atorvastatin 20 milliGRAM(s) Oral daily  bisacodyl Suppository 10 milliGRAM(s) Rectal daily PRN  buDESOnide   0.25 milliGRAM(s) Respule 0.25 milliGRAM(s) Inhalation daily PRN  celecoxib 200 milliGRAM(s) Oral two times a day  docusate sodium 100 milliGRAM(s) Oral three times a day  enoxaparin Injectable 30 milliGRAM(s) SubCutaneous two times a day  ferrous    sulfate 325 milliGRAM(s) Oral daily  FLUoxetine 20 milliGRAM(s) Oral daily  furosemide    Tablet 20 milliGRAM(s) Oral daily  gabapentin 300 milliGRAM(s) Oral two times a day  HYDROmorphone  Injectable 0.5 milliGRAM(s) IV Push every 3 hours PRN  magnesium hydroxide Suspension 30 milliLiter(s) Oral daily PRN  ondansetron Injectable 4 milliGRAM(s) IV Push every 6 hours PRN  oxybutynin XL 5 milliGRAM(s) Oral daily  oxyCODONE    IR 5 milliGRAM(s) Oral every 3 hours PRN  oxyCODONE    IR 10 milliGRAM(s) Oral every 3 hours PRN  pantoprazole    Tablet 40 milliGRAM(s) Oral before breakfast  polyethylene glycol 3350 17 Gram(s) Oral daily  ranitidine  Oral Tab/Cap - Peds 150 milliGRAM(s) Oral two times a day  senna 2 Tablet(s) Oral at bedtime PRN  sodium chloride 0.9% Bolus 250 milliLiter(s) IV Bolus once  sodium chloride 0.9% lock flush 3 milliLiter(s) IV Push every 8 hours  sodium chloride 0.9%. 1000 milliLiter(s) IV Continuous <Continuous>  warfarin 5 milliGRAM(s) Oral at bedtime                                  8.8    5.0   )-----------( 150      ( 18 Oct 2018 07:19 )             28.0     10-18    139  |  102  |  28.0<H>  ----------------------------<  103  4.8   |  28.0  |  0.97    Ca    9.1      18 Oct 2018 07:19        PHYSICAL EXAM:    Vital Signs Last 24 Hrs  T(C): 36.6 (19 Oct 2018 05:50), Max: 36.9 (18 Oct 2018 09:34)  T(F): 97.9 (19 Oct 2018 05:50), Max: 98.5 (18 Oct 2018 20:30)  HR: 64 (19 Oct 2018 05:50) (58 - 64)  BP: 125/65 (19 Oct 2018 05:50) (82/66 - 125/65)  BP(mean): --  RR: 18 (19 Oct 2018 05:50) (18 - 18)  SpO2: 100% (19 Oct 2018 05:50) (97% - 100%)  Daily     Daily     Appearance: Alert, responsive, in no acute distress.    Left Lower Extremity:  Knee dressing C/D/I, no drainage, no bleeding. Dressing removed, and OP-site dressing applied.  Incision healing well, no erythema, no warms, no blistering. +dorsiflexion/plantarflexion. DP 2+ B/L. Sensation is grossly intact to light touch. Calf supple NT B/L.        A/P: Pt is a  84y Female s/p Left TKA, POD #3.     PLAN:   ·	DVT prophylaxis as prescribed   ·	Continue in progression of  physical therapy: Weightbearing as tolerated of the left lower extremity with assistance of a walker  ·	Incentive spirometry encouraged  ·	Pain control as clinically indicated  ·	Discharge planning: home today if cleared by medicine and physical therapy

## 2018-10-19 NOTE — PROGRESS NOTE ADULT - ASSESSMENT
Assessment: 85 y/o female Atrial fibrillation, Bleeding internal hemorrhoids, CHF (congestive heart failure), Compression fracture, COPD, GERD, HTN, DVT (deep venous thrombosis) IVC (inferior vena cava obstruction)  ivc - catherter - 1/9 , left  in january 2015, Fall, Pacemaker, PE (pulmonary embolism),TIA (transient ischemic attack), Vascular insufficiency, she is on coumadin, she has been having left  knee pain unrelieved with conservative measures, she is She is s/p right TKR, today came in for the elective Left TKA    Plan:     Osteoarthritis of the Left knee s/p TKR post op day 03: PT/OT/pain mgmt, DVT prophylaxis- as per ortho, Abx as per SCIP, Incentive spirometry, Prophylaxis of opioid  induced constipation, will d/c IV fluids as she has no dizziness while working with PT and she is eating is drinking well.     HTN: Will continue with atenolol 25 mg a day, will Monitor BP.     Hx of DVT and PE: As per Ortho, patient is on Lovenox 30mg BID and Coumadin her INR yesterday was 1.91, INR pending today, if INR is theurapeutic then she might need Lovenox, she needed Lovenox self injection teaching, and follow Coumadin clinic.       HLD: Continue with atorvastatin: Last Dose Taken: 20 milligram(s) orally once a day..     Hx of CHF: Patient is Euvolemic will continue with her Lasix 20mg daily, d/do IV fluids.      Neuropathy: Gabapentin 300 mg 2 times a day    Depression: will continue with FLUoxetine 20 mg once a day.     Acute blood loss anemia:, she was told to take Take iron pill daily and get her cbc check in 2 weeks.     Follow the Office for the INR check and coumadin dose adjustment.    patient was noted to have low BP and was feeling dizziness, got Bolus and maintenance IV fluids and BP has been stable, no mor hypotension.   Patient is doing well, she is stable for discharge from the medicine point of view pending PT and Orthopedics Clearance.   her INR yesterday was 1.91, INR pending today, if INR is therapeutic then she might need Lovenox, she needed Lovenox self injection teaching, and follow Coumadin clinic.

## 2018-10-22 ENCOUNTER — APPOINTMENT (OUTPATIENT)
Dept: ORTHOPEDIC SURGERY | Facility: CLINIC | Age: 83
End: 2018-10-22

## 2018-11-01 PROBLEM — I99.8 OTHER DISORDER OF CIRCULATORY SYSTEM: Chronic | Status: ACTIVE | Noted: 2018-09-27

## 2018-11-07 ENCOUNTER — OTHER (OUTPATIENT)
Age: 83
End: 2018-11-07

## 2018-11-07 ENCOUNTER — APPOINTMENT (OUTPATIENT)
Dept: ORTHOPEDIC SURGERY | Facility: CLINIC | Age: 83
End: 2018-11-07
Payer: MEDICARE

## 2018-11-07 VITALS
HEART RATE: 76 BPM | DIASTOLIC BLOOD PRESSURE: 54 MMHG | HEIGHT: 68 IN | SYSTOLIC BLOOD PRESSURE: 88 MMHG | WEIGHT: 240 LBS | BODY MASS INDEX: 36.37 KG/M2

## 2018-11-07 DIAGNOSIS — Z96.651 PRESENCE OF RIGHT ARTIFICIAL KNEE JOINT: ICD-10-CM

## 2018-11-07 PROCEDURE — 73562 X-RAY EXAM OF KNEE 3: CPT | Mod: LT

## 2018-11-07 PROCEDURE — 99024 POSTOP FOLLOW-UP VISIT: CPT

## 2018-11-11 PROCEDURE — 86850 RBC ANTIBODY SCREEN: CPT

## 2018-11-11 PROCEDURE — 85730 THROMBOPLASTIN TIME PARTIAL: CPT

## 2018-11-11 PROCEDURE — 85027 COMPLETE CBC AUTOMATED: CPT

## 2018-11-11 PROCEDURE — 85610 PROTHROMBIN TIME: CPT

## 2018-11-11 PROCEDURE — C1713: CPT

## 2018-11-11 PROCEDURE — 86900 BLOOD TYPING SEROLOGIC ABO: CPT

## 2018-11-11 PROCEDURE — C1776: CPT

## 2018-11-11 PROCEDURE — 88311 DECALCIFY TISSUE: CPT

## 2018-11-11 PROCEDURE — 94640 AIRWAY INHALATION TREATMENT: CPT

## 2018-11-11 PROCEDURE — 82962 GLUCOSE BLOOD TEST: CPT

## 2018-11-11 PROCEDURE — 97166 OT EVAL MOD COMPLEX 45 MIN: CPT

## 2018-11-11 PROCEDURE — 36415 COLL VENOUS BLD VENIPUNCTURE: CPT

## 2018-11-11 PROCEDURE — 80048 BASIC METABOLIC PNL TOTAL CA: CPT

## 2018-11-11 PROCEDURE — 88305 TISSUE EXAM BY PATHOLOGIST: CPT

## 2018-11-11 PROCEDURE — 86901 BLOOD TYPING SEROLOGIC RH(D): CPT

## 2018-11-11 PROCEDURE — 97163 PT EVAL HIGH COMPLEX 45 MIN: CPT

## 2018-11-11 PROCEDURE — 73560 X-RAY EXAM OF KNEE 1 OR 2: CPT

## 2018-11-13 ENCOUNTER — OUTPATIENT (OUTPATIENT)
Dept: OUTPATIENT SERVICES | Facility: HOSPITAL | Age: 83
LOS: 1 days | End: 2018-11-13
Payer: MEDICARE

## 2018-11-13 ENCOUNTER — OTHER (OUTPATIENT)
Age: 83
End: 2018-11-13

## 2018-11-13 DIAGNOSIS — Z98.89 OTHER SPECIFIED POSTPROCEDURAL STATES: Chronic | ICD-10-CM

## 2018-11-13 DIAGNOSIS — G56.00 CARPAL TUNNEL SYNDROME, UNSPECIFIED UPPER LIMB: Chronic | ICD-10-CM

## 2018-11-13 DIAGNOSIS — Z96.651 PRESENCE OF RIGHT ARTIFICIAL KNEE JOINT: Chronic | ICD-10-CM

## 2018-11-13 DIAGNOSIS — Z96.652 PRESENCE OF LEFT ARTIFICIAL KNEE JOINT: ICD-10-CM

## 2018-11-13 DIAGNOSIS — Z90.49 ACQUIRED ABSENCE OF OTHER SPECIFIED PARTS OF DIGESTIVE TRACT: Chronic | ICD-10-CM

## 2018-11-13 DIAGNOSIS — H02.403 UNSPECIFIED PTOSIS OF BILATERAL EYELIDS: Chronic | ICD-10-CM

## 2018-11-13 DIAGNOSIS — Z98.890 OTHER SPECIFIED POSTPROCEDURAL STATES: Chronic | ICD-10-CM

## 2018-11-13 DIAGNOSIS — M75.121 COMPLETE ROTATOR CUFF TEAR OR RUPTURE OF RIGHT SHOULDER, NOT SPECIFIED AS TRAUMATIC: Chronic | ICD-10-CM

## 2018-11-13 DIAGNOSIS — Z51.89 ENCOUNTER FOR OTHER SPECIFIED AFTERCARE: ICD-10-CM

## 2018-11-13 DIAGNOSIS — D12.6 BENIGN NEOPLASM OF COLON, UNSPECIFIED: Chronic | ICD-10-CM

## 2018-11-13 DIAGNOSIS — Z90.710 ACQUIRED ABSENCE OF BOTH CERVIX AND UTERUS: Chronic | ICD-10-CM

## 2018-11-13 DIAGNOSIS — Z95.828 PRESENCE OF OTHER VASCULAR IMPLANTS AND GRAFTS: Chronic | ICD-10-CM

## 2018-11-13 DIAGNOSIS — Z95.0 PRESENCE OF CARDIAC PACEMAKER: Chronic | ICD-10-CM

## 2018-11-13 DIAGNOSIS — Z98.42 CATARACT EXTRACTION STATUS, LEFT EYE: Chronic | ICD-10-CM

## 2018-11-13 DIAGNOSIS — Z98.41 CATARACT EXTRACTION STATUS, RIGHT EYE: Chronic | ICD-10-CM

## 2018-11-13 DIAGNOSIS — K64.8 OTHER HEMORRHOIDS: Chronic | ICD-10-CM

## 2018-11-13 DIAGNOSIS — H02.409 UNSPECIFIED PTOSIS OF UNSPECIFIED EYELID: Chronic | ICD-10-CM

## 2018-11-23 NOTE — H&P ADULT - PROBLEM SELECTOR PLAN 1
"Pt reports rash and itching x 10 days, reports she has tried nothing OTC for it. "I think I have ringwrom." Small red dot noted to L forearm, diffuse rash noted to upper chest, above rectum. Denies any drainage.  " multiple imaging studies/labs ordered, will f/u and treat accordingly

## 2018-11-29 PROCEDURE — G8979: CPT | Mod: CK

## 2018-11-29 PROCEDURE — 97110 THERAPEUTIC EXERCISES: CPT

## 2018-11-29 PROCEDURE — 97162 PT EVAL MOD COMPLEX 30 MIN: CPT

## 2018-11-29 PROCEDURE — 97010 HOT OR COLD PACKS THERAPY: CPT

## 2018-11-29 PROCEDURE — G8978: CPT | Mod: CL

## 2018-11-30 ENCOUNTER — APPOINTMENT (OUTPATIENT)
Dept: ORTHOPEDIC SURGERY | Facility: CLINIC | Age: 83
End: 2018-11-30
Payer: MEDICARE

## 2018-11-30 VITALS
HEIGHT: 68 IN | DIASTOLIC BLOOD PRESSURE: 59 MMHG | BODY MASS INDEX: 36.37 KG/M2 | WEIGHT: 240 LBS | SYSTOLIC BLOOD PRESSURE: 96 MMHG | HEART RATE: 71 BPM

## 2018-11-30 DIAGNOSIS — Z80.9 FAMILY HISTORY OF MALIGNANT NEOPLASM, UNSPECIFIED: ICD-10-CM

## 2018-11-30 PROCEDURE — 73562 X-RAY EXAM OF KNEE 3: CPT | Mod: LT

## 2018-11-30 PROCEDURE — 99024 POSTOP FOLLOW-UP VISIT: CPT

## 2018-11-30 RX ORDER — COLLAGENASE SANTYL 250 [ARB'U]/G
250 OINTMENT TOPICAL DAILY
Qty: 1 | Refills: 2 | Status: DISCONTINUED | COMMUNITY
Start: 2018-11-30 | End: 2018-11-30

## 2018-11-30 RX ORDER — ATENOLOL 25 MG/1
25 TABLET ORAL
Refills: 0 | Status: DISCONTINUED | COMMUNITY
Start: 2017-03-23 | End: 2018-11-30

## 2018-12-05 ENCOUNTER — APPOINTMENT (OUTPATIENT)
Dept: ORTHOPEDIC SURGERY | Facility: CLINIC | Age: 83
End: 2018-12-05
Payer: MEDICARE

## 2018-12-05 VITALS
HEIGHT: 68 IN | SYSTOLIC BLOOD PRESSURE: 96 MMHG | HEART RATE: 66 BPM | WEIGHT: 240 LBS | DIASTOLIC BLOOD PRESSURE: 60 MMHG | BODY MASS INDEX: 36.37 KG/M2

## 2018-12-05 PROCEDURE — 99024 POSTOP FOLLOW-UP VISIT: CPT

## 2018-12-07 ENCOUNTER — OTHER (OUTPATIENT)
Age: 83
End: 2018-12-07

## 2018-12-14 ENCOUNTER — APPOINTMENT (OUTPATIENT)
Dept: ORTHOPEDIC SURGERY | Facility: CLINIC | Age: 83
End: 2018-12-14
Payer: MEDICARE

## 2018-12-14 VITALS
DIASTOLIC BLOOD PRESSURE: 54 MMHG | WEIGHT: 240 LBS | HEIGHT: 68 IN | BODY MASS INDEX: 36.37 KG/M2 | SYSTOLIC BLOOD PRESSURE: 83 MMHG | HEART RATE: 83 BPM

## 2018-12-14 DIAGNOSIS — S81.002A UNSPECIFIED OPEN WOUND, LEFT KNEE, INITIAL ENCOUNTER: ICD-10-CM

## 2018-12-14 PROCEDURE — 99024 POSTOP FOLLOW-UP VISIT: CPT

## 2018-12-28 ENCOUNTER — APPOINTMENT (OUTPATIENT)
Dept: ORTHOPEDIC SURGERY | Facility: CLINIC | Age: 83
End: 2018-12-28
Payer: MEDICARE

## 2018-12-28 VITALS
WEIGHT: 240 LBS | SYSTOLIC BLOOD PRESSURE: 91 MMHG | DIASTOLIC BLOOD PRESSURE: 58 MMHG | HEART RATE: 87 BPM | BODY MASS INDEX: 36.37 KG/M2 | HEIGHT: 68 IN

## 2018-12-28 DIAGNOSIS — Z47.1 AFTERCARE FOLLOWING JOINT REPLACEMENT SURGERY: ICD-10-CM

## 2018-12-28 DIAGNOSIS — Z96.652 AFTERCARE FOLLOWING JOINT REPLACEMENT SURGERY: ICD-10-CM

## 2018-12-28 DIAGNOSIS — Z96.652 PRESENCE OF LEFT ARTIFICIAL KNEE JOINT: ICD-10-CM

## 2018-12-28 PROCEDURE — 73562 X-RAY EXAM OF KNEE 3: CPT | Mod: LT

## 2018-12-28 PROCEDURE — 99024 POSTOP FOLLOW-UP VISIT: CPT

## 2018-12-31 ENCOUNTER — OTHER (OUTPATIENT)
Age: 83
End: 2018-12-31

## 2019-01-04 ENCOUNTER — APPOINTMENT (OUTPATIENT)
Dept: ORTHOPEDIC SURGERY | Facility: CLINIC | Age: 84
End: 2019-01-04

## 2019-01-14 ENCOUNTER — FORM ENCOUNTER (OUTPATIENT)
Age: 84
End: 2019-01-14

## 2019-01-18 ENCOUNTER — APPOINTMENT (OUTPATIENT)
Dept: PULMONOLOGY | Facility: CLINIC | Age: 84
End: 2019-01-18
Payer: MEDICARE

## 2019-01-18 VITALS — SYSTOLIC BLOOD PRESSURE: 124 MMHG | OXYGEN SATURATION: 98 % | DIASTOLIC BLOOD PRESSURE: 62 MMHG | HEART RATE: 63 BPM

## 2019-01-18 DIAGNOSIS — R60.0 LOCALIZED EDEMA: ICD-10-CM

## 2019-01-18 DIAGNOSIS — E66.9 OBESITY, UNSPECIFIED: ICD-10-CM

## 2019-01-18 PROCEDURE — 99215 OFFICE O/P EST HI 40 MIN: CPT

## 2019-01-18 RX ORDER — OXYBUTYNIN CHLORIDE 10 MG/1
10 TABLET, EXTENDED RELEASE ORAL
Qty: 30 | Refills: 1 | Status: DISCONTINUED | COMMUNITY
Start: 2018-03-19 | End: 2019-01-18

## 2019-01-18 RX ORDER — CEFUROXIME AXETIL 500 MG/1
500 TABLET ORAL
Qty: 10 | Refills: 6 | Status: DISCONTINUED | COMMUNITY
Start: 2018-05-03 | End: 2019-01-18

## 2019-01-18 RX ORDER — METHYLPREDNISOLONE 4 MG/1
4 TABLET ORAL
Qty: 1 | Refills: 3 | Status: DISCONTINUED | COMMUNITY
Start: 2018-08-10 | End: 2019-01-18

## 2019-01-18 RX ORDER — CEPHALEXIN 500 MG/1
500 CAPSULE ORAL 4 TIMES DAILY
Qty: 12 | Refills: 0 | Status: DISCONTINUED | COMMUNITY
Start: 2018-11-30 | End: 2019-01-18

## 2019-01-18 RX ORDER — OXYCODONE 5 MG/1
5 TABLET ORAL
Qty: 40 | Refills: 0 | Status: DISCONTINUED | COMMUNITY
Start: 2018-11-07 | End: 2019-01-18

## 2019-01-18 RX ORDER — SULFAMETHOXAZOLE AND TRIMETHOPRIM 800; 160 MG/1; MG/1
800-160 TABLET ORAL TWICE DAILY
Qty: 20 | Refills: 0 | Status: DISCONTINUED | COMMUNITY
Start: 2018-11-30 | End: 2019-01-18

## 2019-01-18 RX ORDER — OSELTAMIVIR PHOSPHATE 75 MG/1
75 CAPSULE ORAL
Qty: 10 | Refills: 0 | Status: DISCONTINUED | COMMUNITY
Start: 2018-02-09 | End: 2019-01-18

## 2019-01-18 NOTE — HISTORY OF PRESENT ILLNESS
[FreeTextEntry1] : cough and sputum, yellow\par given doxycycline, day 3 no improvement\par no fever, chill, chest pain\par dyspnea worse than baseline\par using 02 nocturnally\par remains on coumadin and IVC filter, also atrial fib\par had flu vaccine\par

## 2019-01-18 NOTE — REVIEW OF SYSTEMS
[Nasal Congestion] : nasal congestion [As Noted in HPI] : as noted in HPI [Arthralgias] : arthralgias [Negative] : Psychiatric [FreeTextEntry9] : followed by Dr Rolle, has PPM

## 2019-01-18 NOTE — PROCEDURE
[FreeTextEntry1] : CXR 1/19 reviewed and compared, no focal infiltrate\par spirometry: moderate air flow obstruction with restrictive component, improved FVC and FEV1 from 8/18

## 2019-01-18 NOTE — DISCUSSION/SUMMARY
[FreeTextEntry1] :  COPD with asthmatic component Gold II , with exacerbation\par change to Duo nebs 3-4 x daily, budesonide daily\par cxr without infiltrate\par pred taper/ abx given\par Recurrent falls, has IVC filter, remains on Coumadin for atrial fibrillation\par ? watchman candidate she will follow with cardiology\par Pulse oximetry is normal and exam without bronchospasm\par refuses cpap, has dentures cant use appliance, use 02 2 liters nocturnal\par vaccinations this fall, 3 months or sooner if needed\par

## 2019-01-18 NOTE — PHYSICAL EXAM
[General Appearance - Well Developed] : well developed [Normal Appearance] : normal appearance [Well Groomed] : well groomed [General Appearance - Well Nourished] : well nourished [No Deformities] : no deformities [Neck Appearance] : the appearance of the neck was normal [Heart Rate And Rhythm] : heart rate and rhythm were normal [Heart Sounds] : normal S1 and S2 [Murmurs] : no murmurs present [Respiration, Rhythm And Depth] : normal respiratory rhythm and effort [Exaggerated Use Of Accessory Muscles For Inspiration] : no accessory muscle use [Nail Clubbing] : no clubbing of the fingernails [Cyanosis, Localized] : no localized cyanosis [] : no rash [No Focal Deficits] : no focal deficits [Oriented To Time, Place, And Person] : oriented to person, place, and time [Impaired Insight] : insight and judgment were intact [Affect] : the affect was normal [Memory Recent] : recent memory was not impaired [FreeTextEntry1] : in wheelchair [FreeTextEntry2] : no calf tenderness

## 2019-01-18 NOTE — CONSULT LETTER
[Dear  ___] : Dear  [unfilled], [Consult Letter:] : I had the pleasure of evaluating your patient, [unfilled]. [Please see my note below.] : Please see my note below. [Consult Closing:] : Thank you very much for allowing me to participate in the care of this patient.  If you have any questions, please do not hesitate to contact me. [Sincerely,] : Sincerely, [DrDave  ___] : Dr. STONER [DrDave ___] : Dr. STONER [Owen William DO, [facility]] : Owen William DO, [facility] [Director, Respiratory Care] : Director, Respiratory Care [[facility]] : [facility] [FreeTextEntry3] : Owen William DO [facility]\par Pulmonary Critical Care\par Director Pulmonary Division\par Medical Director Respiratory Therapy\par [facility]\par \par

## 2019-01-21 ENCOUNTER — INPATIENT (INPATIENT)
Facility: HOSPITAL | Age: 84
LOS: 6 days | Discharge: HOME | DRG: 190 | End: 2019-01-28
Attending: INTERNAL MEDICINE | Admitting: HOSPITALIST
Payer: MEDICARE

## 2019-01-21 VITALS
HEIGHT: 65 IN | OXYGEN SATURATION: 99 % | DIASTOLIC BLOOD PRESSURE: 78 MMHG | TEMPERATURE: 98 F | RESPIRATION RATE: 22 BRPM | SYSTOLIC BLOOD PRESSURE: 159 MMHG | WEIGHT: 229.94 LBS | HEART RATE: 63 BPM

## 2019-01-21 DIAGNOSIS — Z98.890 OTHER SPECIFIED POSTPROCEDURAL STATES: Chronic | ICD-10-CM

## 2019-01-21 DIAGNOSIS — G56.00 CARPAL TUNNEL SYNDROME, UNSPECIFIED UPPER LIMB: Chronic | ICD-10-CM

## 2019-01-21 DIAGNOSIS — Z98.89 OTHER SPECIFIED POSTPROCEDURAL STATES: Chronic | ICD-10-CM

## 2019-01-21 DIAGNOSIS — D12.6 BENIGN NEOPLASM OF COLON, UNSPECIFIED: Chronic | ICD-10-CM

## 2019-01-21 DIAGNOSIS — Z98.41 CATARACT EXTRACTION STATUS, RIGHT EYE: Chronic | ICD-10-CM

## 2019-01-21 DIAGNOSIS — Z90.710 ACQUIRED ABSENCE OF BOTH CERVIX AND UTERUS: Chronic | ICD-10-CM

## 2019-01-21 DIAGNOSIS — Z96.651 PRESENCE OF RIGHT ARTIFICIAL KNEE JOINT: Chronic | ICD-10-CM

## 2019-01-21 DIAGNOSIS — Z95.0 PRESENCE OF CARDIAC PACEMAKER: Chronic | ICD-10-CM

## 2019-01-21 DIAGNOSIS — J18.1 LOBAR PNEUMONIA, UNSPECIFIED ORGANISM: ICD-10-CM

## 2019-01-21 DIAGNOSIS — Z95.828 PRESENCE OF OTHER VASCULAR IMPLANTS AND GRAFTS: Chronic | ICD-10-CM

## 2019-01-21 DIAGNOSIS — Z98.42 CATARACT EXTRACTION STATUS, LEFT EYE: Chronic | ICD-10-CM

## 2019-01-21 DIAGNOSIS — K64.8 OTHER HEMORRHOIDS: Chronic | ICD-10-CM

## 2019-01-21 DIAGNOSIS — H02.409 UNSPECIFIED PTOSIS OF UNSPECIFIED EYELID: Chronic | ICD-10-CM

## 2019-01-21 DIAGNOSIS — M75.121 COMPLETE ROTATOR CUFF TEAR OR RUPTURE OF RIGHT SHOULDER, NOT SPECIFIED AS TRAUMATIC: Chronic | ICD-10-CM

## 2019-01-21 DIAGNOSIS — H02.403 UNSPECIFIED PTOSIS OF BILATERAL EYELIDS: Chronic | ICD-10-CM

## 2019-01-21 DIAGNOSIS — Z90.49 ACQUIRED ABSENCE OF OTHER SPECIFIED PARTS OF DIGESTIVE TRACT: Chronic | ICD-10-CM

## 2019-01-21 LAB
ALBUMIN SERPL ELPH-MCNC: 4.2 G/DL — SIGNIFICANT CHANGE UP (ref 3.3–5.2)
ALP SERPL-CCNC: 46 U/L — SIGNIFICANT CHANGE UP (ref 40–120)
ALT FLD-CCNC: 11 U/L — SIGNIFICANT CHANGE UP
ANION GAP SERPL CALC-SCNC: 12 MMOL/L — SIGNIFICANT CHANGE UP (ref 5–17)
APTT BLD: 37 SEC — HIGH (ref 27.5–36.3)
AST SERPL-CCNC: 19 U/L — SIGNIFICANT CHANGE UP
BASE EXCESS BLDA CALC-SCNC: 3.7 MMOL/L — HIGH (ref -2–2)
BILIRUB SERPL-MCNC: 0.7 MG/DL — SIGNIFICANT CHANGE UP (ref 0.4–2)
BLOOD GAS COMMENTS ARTERIAL: SIGNIFICANT CHANGE UP
BUN SERPL-MCNC: 22 MG/DL — HIGH (ref 8–20)
CALCIUM SERPL-MCNC: 9.7 MG/DL — SIGNIFICANT CHANGE UP (ref 8.6–10.2)
CHLORIDE SERPL-SCNC: 96 MMOL/L — LOW (ref 98–107)
CO2 SERPL-SCNC: 28 MMOL/L — SIGNIFICANT CHANGE UP (ref 22–29)
CREAT SERPL-MCNC: 0.76 MG/DL — SIGNIFICANT CHANGE UP (ref 0.5–1.3)
EOSINOPHIL # BLD AUTO: 0 K/UL — SIGNIFICANT CHANGE UP (ref 0–0.5)
EOSINOPHIL NFR BLD AUTO: 0 % — SIGNIFICANT CHANGE UP (ref 0–6)
GAS PNL BLDA: SIGNIFICANT CHANGE UP
GLUCOSE SERPL-MCNC: 117 MG/DL — HIGH (ref 70–115)
HCO3 BLDA-SCNC: 28 MMOL/L — HIGH (ref 20–26)
HCT VFR BLD CALC: 36.2 % — LOW (ref 37–47)
HGB BLD-MCNC: 11.4 G/DL — LOW (ref 12–16)
HOROWITZ INDEX BLDA+IHG-RTO: SIGNIFICANT CHANGE UP
INR BLD: 2.79 RATIO — HIGH (ref 0.88–1.16)
LACTATE BLDV-MCNC: 1.7 MMOL/L — SIGNIFICANT CHANGE UP (ref 0.5–2)
LYMPHOCYTES # BLD AUTO: 0.5 K/UL — LOW (ref 1–4.8)
LYMPHOCYTES # BLD AUTO: 14.5 % — LOW (ref 20–55)
MCHC RBC-ENTMCNC: 31.5 G/DL — LOW (ref 32–36)
MCHC RBC-ENTMCNC: 32.3 PG — HIGH (ref 27–31)
MCV RBC AUTO: 102.5 FL — HIGH (ref 81–99)
MONOCYTES # BLD AUTO: 0.2 K/UL — SIGNIFICANT CHANGE UP (ref 0–0.8)
MONOCYTES NFR BLD AUTO: 4.5 % — SIGNIFICANT CHANGE UP (ref 3–10)
NEUTROPHILS # BLD AUTO: 2.9 K/UL — SIGNIFICANT CHANGE UP (ref 1.8–8)
NEUTROPHILS NFR BLD AUTO: 80.4 % — HIGH (ref 37–73)
NT-PROBNP SERPL-SCNC: 2711 PG/ML — HIGH (ref 0–300)
PCO2 BLDA: 40 MMHG — SIGNIFICANT CHANGE UP (ref 35–45)
PH BLDA: 7.46 — HIGH (ref 7.35–7.45)
PLATELET # BLD AUTO: 176 K/UL — SIGNIFICANT CHANGE UP (ref 150–400)
PO2 BLDA: 120 MMHG — HIGH (ref 83–108)
POTASSIUM SERPL-MCNC: 4.5 MMOL/L — SIGNIFICANT CHANGE UP (ref 3.5–5.3)
POTASSIUM SERPL-SCNC: 4.5 MMOL/L — SIGNIFICANT CHANGE UP (ref 3.5–5.3)
PROT SERPL-MCNC: 7.4 G/DL — SIGNIFICANT CHANGE UP (ref 6.6–8.7)
PROTHROM AB SERPL-ACNC: 33.1 SEC — HIGH (ref 10–12.9)
RAPID RVP RESULT: DETECTED
RBC # BLD: 3.53 M/UL — LOW (ref 4.4–5.2)
RBC # FLD: 14.7 % — SIGNIFICANT CHANGE UP (ref 11–15.6)
RSV RNA SPEC QL NAA+PROBE: DETECTED
SAO2 % BLDA: 100 % — HIGH (ref 95–99)
SODIUM SERPL-SCNC: 136 MMOL/L — SIGNIFICANT CHANGE UP (ref 135–145)
WBC # BLD: 3.6 K/UL — LOW (ref 4.8–10.8)
WBC # FLD AUTO: 3.6 K/UL — LOW (ref 4.8–10.8)

## 2019-01-21 PROCEDURE — 93010 ELECTROCARDIOGRAM REPORT: CPT

## 2019-01-21 PROCEDURE — 99285 EMERGENCY DEPT VISIT HI MDM: CPT

## 2019-01-21 PROCEDURE — 99223 1ST HOSP IP/OBS HIGH 75: CPT

## 2019-01-21 PROCEDURE — 71045 X-RAY EXAM CHEST 1 VIEW: CPT | Mod: 26

## 2019-01-21 RX ORDER — MAGNESIUM SULFATE 500 MG/ML
2 VIAL (ML) INJECTION ONCE
Qty: 0 | Refills: 0 | Status: COMPLETED | OUTPATIENT
Start: 2019-01-21 | End: 2019-01-21

## 2019-01-21 RX ORDER — IPRATROPIUM/ALBUTEROL SULFATE 18-103MCG
3 AEROSOL WITH ADAPTER (GRAM) INHALATION ONCE
Qty: 0 | Refills: 0 | Status: COMPLETED | OUTPATIENT
Start: 2019-01-21 | End: 2019-01-21

## 2019-01-21 RX ORDER — IPRATROPIUM/ALBUTEROL SULFATE 18-103MCG
3 AEROSOL WITH ADAPTER (GRAM) INHALATION EVERY 6 HOURS
Qty: 0 | Refills: 0 | Status: DISCONTINUED | OUTPATIENT
Start: 2019-01-21 | End: 2019-01-28

## 2019-01-21 RX ORDER — AZITHROMYCIN 500 MG/1
500 TABLET, FILM COATED ORAL DAILY
Qty: 0 | Refills: 0 | Status: COMPLETED | OUTPATIENT
Start: 2019-01-21 | End: 2019-01-24

## 2019-01-21 RX ADMIN — Medication 50 GRAM(S): at 19:26

## 2019-01-21 RX ADMIN — Medication 125 MILLIGRAM(S): at 19:26

## 2019-01-21 RX ADMIN — Medication 3 MILLILITER(S): at 21:02

## 2019-01-21 RX ADMIN — Medication 3 MILLILITER(S): at 19:26

## 2019-01-21 RX ADMIN — Medication 2 GRAM(S): at 21:03

## 2019-01-21 RX ADMIN — Medication 3 MILLILITER(S): at 19:00

## 2019-01-21 NOTE — H&P ADULT - HISTORY OF PRESENT ILLNESS
86 y/o female with PMH of asthma, COPD ( on home O2 2L),  afib s/p ablation on coumadin, came to the ED complaining of cough and shortness of breath. Patient said the cough has been going on for almost 1 month now, productive with white-yellowish sputum. She said she saw her pulmonologist recently and was treated with antibiotics but her symptoms persisted. Patient said her difficulty breathing worsen yesterday, any minimal exertion resulted in shortness of breath. She usually use her O2 at night but yesterday she used it all day and used her nebulizer with no relief. She said she was also recently started on Prednisone 10mg. She noted chest tightness with the cough. She has no fever, chills, palpittion, nausea/vomiting, abdominal pain, change in bowel/urinary habit, recent travel, calf pain.

## 2019-01-21 NOTE — ED PROVIDER NOTE - OBJECTIVE STATEMENT
86 y/o F with h/o asthma, copd on home o2, knee replacement, ppm, s/p av node ablation for afib on coumadin, greenfiled ivc filter + p/w persistent cough with progressive sob ad need for increased o2 Supplemental at home, sent by pmd to r/o pna. pt was recently started on 10 mg prednisone, no fever, chils, chest pain, sob on exertion. No nausea, vomiting, diarrhea, syncope, recent fall or trauma 84 y/o F with h/o asthma, copd on home o2, knee replacement, ppm, s/p av node ablation for afib on coumadin, amaya ivc filter + p/w persistent cough with progressive sob ad need for increased o2 Supplemental at home, sent by pmd to r/o pna. pt was recently started on 10 mg prednisone, no fever, chils, chest pain, sob on exertion. No nausea, vomiting, diarrhea, syncope, recent fall or trauma

## 2019-01-21 NOTE — ED STATDOCS - PROGRESS NOTE DETAILS
Rossi Wilcox for Dr Paresh De Jesus : Pt is an 84 y/o F, with PMHx asthma, COPD, bronchitis, on O2 at home prn, presenting to the ED with URI sxs for the last one month. Pt reports productive cough with yellow sputum production. Was seen at initial onset of sxs and has since completed two rounds of OP abx and steroids without relief in sxs. Saw her pulmonologist 4 days ago and advised to come to the ED today if sxs did not improve. + wheezing and SOB. Cough worse at night. Pt states she has been having to use O2 more frequently. Will send to main.   Ebarb-PMD

## 2019-01-21 NOTE — H&P ADULT - ASSESSMENT
COPD exacerbation   Admit to  bed               Supportive   DVT prophylaxis: on Warfarin   Diet: 86 y/o female with PMH of asthma, COPD ( on home O2 2L),  afib s/p ablation on coumadin, came to the ED complaining of cough and shortness of breath.     COPD/Asthma exacerbation   Admit to  bed   Albuterol and steroid given in the ED   Will continue Duoneb standing   Methylprednisolone 60mg q8h   Azithromycin 500mg PO x 3days   O2 via NC 2L   Pulmonary consult (Dr. William)   Continue Benzonate 100mg tid for cough     PE/DVT   S/p IVC filter     Afib   S/p ablation   Continue Coumadin 4mg HS     Anemia   Continue Ferrous SO4 325mg daily     CHF  Continue Lasix 40mg daily     HTN   Atenolol 25mg     GERD  PPI 40mg     Supportive   DVT prophylaxis: on Warfarin   Diet: DASH 86 y/o female with PMH of asthma, COPD ( on home O2 2L),  afib s/p ablation on coumadin, came to the ED complaining of cough and shortness of breath.     COPD/Asthma exacerbation   Admit to  bed   Albuterol and steroid given in the ED   Will continue Duoneb standing   Methylprednisolone 60mg q8h   Azithromycin 500mg PO x 3days   O2 via NC 2L   Pulmonary consult (Dr. William)   Continue Benzonate 100mg tid for cough     PE/DVT   S/p IVC filter     Afib   S/p ablation   Continue Coumadin 4mg HS     Anemia   Continue Ferrous SO4 325mg daily     CHF  Continue Lasix 40mg daily     HTN   Atenolol 25mg     Depression   Continue Fluoxetine 20mg     GERD  PPI 40mg     Supportive   DVT prophylaxis: on Warfarin   Diet: DASH

## 2019-01-21 NOTE — ED PROVIDER NOTE - CARE PLAN
Principal Discharge DX:	Pneumonia of right lower lobe due to infectious organism  Secondary Diagnosis:	COPD exacerbation

## 2019-01-21 NOTE — H&P ADULT - FAMILY HISTORY
Family history of coronary artery disease     No pertinent family history in first degree relatives Father  Still living? No  Family history of coronary artery disease, Age at diagnosis: Age Unknown

## 2019-01-21 NOTE — ED ADULT NURSE NOTE - OBJECTIVE STATEMENT
pt ref in for eval of nonproductive wet cough for over a month and other URi symptoms. a+ox3, bilateral diminished breath sounds R>L, chronic home 2 l nc oxygen wearer. abd soft nontender, no pedal edema. denies fever and chills.

## 2019-01-21 NOTE — ED PROVIDER NOTE - MEDICAL DECISION MAKING DETAILS
pt has rt lower lobe pna on cxr, plan to admit , check labs, given lveaquin, magnesium, steroids, will check ce to r/o heart strain given ppm and h/o copd

## 2019-01-21 NOTE — ED ADULT TRIAGE NOTE - CHIEF COMPLAINT QUOTE
URI and cough x 1 month. has COPD, chronic oxygen PRN. yellow productive cough. Sent to check for PN. recent chest x-ray clear.

## 2019-01-22 LAB
ANION GAP SERPL CALC-SCNC: 10 MMOL/L — SIGNIFICANT CHANGE UP (ref 5–17)
BUN SERPL-MCNC: 20 MG/DL — SIGNIFICANT CHANGE UP (ref 8–20)
CALCIUM SERPL-MCNC: 9.2 MG/DL — SIGNIFICANT CHANGE UP (ref 8.6–10.2)
CHLORIDE SERPL-SCNC: 103 MMOL/L — SIGNIFICANT CHANGE UP (ref 98–107)
CK SERPL-CCNC: 52 U/L — SIGNIFICANT CHANGE UP (ref 25–170)
CO2 SERPL-SCNC: 26 MMOL/L — SIGNIFICANT CHANGE UP (ref 22–29)
CREAT SERPL-MCNC: 0.73 MG/DL — SIGNIFICANT CHANGE UP (ref 0.5–1.3)
GLUCOSE SERPL-MCNC: 149 MG/DL — HIGH (ref 70–115)
HCT VFR BLD CALC: 35.7 % — LOW (ref 37–47)
HGB BLD-MCNC: 11.4 G/DL — LOW (ref 12–16)
INR BLD: 2.7 RATIO — HIGH (ref 0.88–1.16)
MCHC RBC-ENTMCNC: 31.9 G/DL — LOW (ref 32–36)
MCHC RBC-ENTMCNC: 32.8 PG — HIGH (ref 27–31)
MCV RBC AUTO: 102.6 FL — HIGH (ref 81–99)
PLATELET # BLD AUTO: 166 K/UL — SIGNIFICANT CHANGE UP (ref 150–400)
POTASSIUM SERPL-MCNC: 4.9 MMOL/L — SIGNIFICANT CHANGE UP (ref 3.5–5.3)
POTASSIUM SERPL-SCNC: 4.9 MMOL/L — SIGNIFICANT CHANGE UP (ref 3.5–5.3)
PROTHROM AB SERPL-ACNC: 32 SEC — HIGH (ref 10–12.9)
RBC # BLD: 3.48 M/UL — LOW (ref 4.4–5.2)
RBC # FLD: 14.8 % — SIGNIFICANT CHANGE UP (ref 11–15.6)
SODIUM SERPL-SCNC: 139 MMOL/L — SIGNIFICANT CHANGE UP (ref 135–145)
TROPONIN T SERPL-MCNC: <0.01 NG/ML — SIGNIFICANT CHANGE UP (ref 0–0.06)
WBC # BLD: 1.9 K/UL — LOW (ref 4.8–10.8)
WBC # FLD AUTO: 1.9 K/UL — LOW (ref 4.8–10.8)

## 2019-01-22 PROCEDURE — 99222 1ST HOSP IP/OBS MODERATE 55: CPT

## 2019-01-22 PROCEDURE — 99232 SBSQ HOSP IP/OBS MODERATE 35: CPT

## 2019-01-22 RX ORDER — ACETAMINOPHEN 500 MG
650 TABLET ORAL EVERY 6 HOURS
Qty: 0 | Refills: 0 | Status: DISCONTINUED | OUTPATIENT
Start: 2019-01-22 | End: 2019-01-28

## 2019-01-22 RX ORDER — WARFARIN SODIUM 2.5 MG/1
2 TABLET ORAL ONCE
Qty: 0 | Refills: 0 | Status: COMPLETED | OUTPATIENT
Start: 2019-01-22 | End: 2019-01-22

## 2019-01-22 RX ORDER — FLUOXETINE HCL 10 MG
20 CAPSULE ORAL DAILY
Qty: 0 | Refills: 0 | Status: DISCONTINUED | OUTPATIENT
Start: 2019-01-22 | End: 2019-01-28

## 2019-01-22 RX ORDER — AMLODIPINE BESYLATE 2.5 MG/1
5 TABLET ORAL DAILY
Qty: 0 | Refills: 0 | Status: DISCONTINUED | OUTPATIENT
Start: 2019-01-22 | End: 2019-01-28

## 2019-01-22 RX ORDER — WARFARIN SODIUM 2.5 MG/1
3 TABLET ORAL ONCE
Qty: 0 | Refills: 0 | Status: DISCONTINUED | OUTPATIENT
Start: 2019-01-22 | End: 2019-01-22

## 2019-01-22 RX ORDER — FERROUS SULFATE 325(65) MG
325 TABLET ORAL DAILY
Qty: 0 | Refills: 0 | Status: DISCONTINUED | OUTPATIENT
Start: 2019-01-22 | End: 2019-01-28

## 2019-01-22 RX ORDER — GABAPENTIN 400 MG/1
1 CAPSULE ORAL
Qty: 0 | Refills: 0 | COMMUNITY

## 2019-01-22 RX ORDER — ATORVASTATIN CALCIUM 80 MG/1
20 TABLET, FILM COATED ORAL AT BEDTIME
Qty: 0 | Refills: 0 | Status: DISCONTINUED | OUTPATIENT
Start: 2019-01-22 | End: 2019-01-28

## 2019-01-22 RX ORDER — ATENOLOL 25 MG/1
25 TABLET ORAL DAILY
Qty: 0 | Refills: 0 | Status: DISCONTINUED | OUTPATIENT
Start: 2019-01-22 | End: 2019-01-28

## 2019-01-22 RX ORDER — PREGABALIN 225 MG/1
2500 CAPSULE ORAL
Qty: 0 | Refills: 0 | COMMUNITY

## 2019-01-22 RX ORDER — SUCRALFATE 1 G
1 TABLET ORAL
Qty: 0 | Refills: 0 | COMMUNITY

## 2019-01-22 RX ORDER — PANTOPRAZOLE SODIUM 20 MG/1
40 TABLET, DELAYED RELEASE ORAL
Qty: 0 | Refills: 0 | Status: DISCONTINUED | OUTPATIENT
Start: 2019-01-22 | End: 2019-01-28

## 2019-01-22 RX ORDER — FUROSEMIDE 40 MG
40 TABLET ORAL DAILY
Qty: 0 | Refills: 0 | Status: DISCONTINUED | OUTPATIENT
Start: 2019-01-22 | End: 2019-01-28

## 2019-01-22 RX ORDER — OXYBUTYNIN CHLORIDE 5 MG
5 TABLET ORAL DAILY
Qty: 0 | Refills: 0 | Status: DISCONTINUED | OUTPATIENT
Start: 2019-01-22 | End: 2019-01-28

## 2019-01-22 RX ADMIN — Medication 40 MILLIGRAM(S): at 12:46

## 2019-01-22 RX ADMIN — Medication 3 MILLILITER(S): at 20:16

## 2019-01-22 RX ADMIN — Medication 3 MILLILITER(S): at 14:28

## 2019-01-22 RX ADMIN — Medication 5 MILLIGRAM(S): at 10:12

## 2019-01-22 RX ADMIN — PANTOPRAZOLE SODIUM 40 MILLIGRAM(S): 20 TABLET, DELAYED RELEASE ORAL at 10:12

## 2019-01-22 RX ADMIN — Medication 20 MILLIGRAM(S): at 10:12

## 2019-01-22 RX ADMIN — Medication 40 MILLIGRAM(S): at 05:19

## 2019-01-22 RX ADMIN — AMLODIPINE BESYLATE 5 MILLIGRAM(S): 2.5 TABLET ORAL at 10:12

## 2019-01-22 RX ADMIN — ATORVASTATIN CALCIUM 20 MILLIGRAM(S): 80 TABLET, FILM COATED ORAL at 21:29

## 2019-01-22 RX ADMIN — WARFARIN SODIUM 2 MILLIGRAM(S): 2.5 TABLET ORAL at 22:50

## 2019-01-22 RX ADMIN — Medication 100 MILLIGRAM(S): at 12:46

## 2019-01-22 RX ADMIN — ATENOLOL 25 MILLIGRAM(S): 25 TABLET ORAL at 05:19

## 2019-01-22 RX ADMIN — Medication 3 MILLILITER(S): at 04:30

## 2019-01-22 RX ADMIN — Medication 100 MILLIGRAM(S): at 21:29

## 2019-01-22 RX ADMIN — Medication 3 MILLILITER(S): at 09:21

## 2019-01-22 RX ADMIN — Medication 100 MILLIGRAM(S): at 05:18

## 2019-01-22 RX ADMIN — Medication 650 MILLIGRAM(S): at 22:10

## 2019-01-22 RX ADMIN — Medication 325 MILLIGRAM(S): at 10:12

## 2019-01-22 RX ADMIN — Medication 40 MILLIGRAM(S): at 21:29

## 2019-01-22 RX ADMIN — Medication 60 MILLIGRAM(S): at 05:19

## 2019-01-22 RX ADMIN — Medication 650 MILLIGRAM(S): at 21:30

## 2019-01-22 RX ADMIN — AZITHROMYCIN 500 MILLIGRAM(S): 500 TABLET, FILM COATED ORAL at 10:12

## 2019-01-22 NOTE — CONSULT NOTE ADULT - SUBJECTIVE AND OBJECTIVE BOX
PULMONARY CONSULT NOTE      YOSELYN PAULSONREY-[id number]    Patient is a 85y old  Female who presents with a chief complaint of COPD exacerbation (21 Jan 2019 23:50)      HISTORY OF PRESENT ILLNESS:  84 y/o female with PMH of asthma, COPD ( on home O2 2L),  afib s/p ablation on coumadin, came to the ED complaining of cough and shortness of breath. Patient said the cough has been going on for almost 1 month now, productive with white-yellowish sputum. She said she saw her pulmonologist recently and was treated with antibiotics but her symptoms persisted. Patient said her difficulty breathing worsen yesterday, any minimal exertion resulted in shortness of breath. She usually use her O2 at night but yesterday she used it all day and used her nebulizer with no relief. She said she was also recently started on Prednisone 10mg. She noted chest tightness with the cough. She has no fever, chills, palpittion, nausea/vomiting, abdominal pain, change in bowel/urinary habit, recent travel, calf pain.     Saw Dr William on 1/18 with productive cough  Gold's II COPD on DuoNeb and Budesonide; Recent pred taper  Failed Doxy  Afib  Falls  Thoughts about Watchman procedure (Left atrial appendage ablation)  Untreated JUAN - dentures (so oral appliance would be difficult)  02 for sleep   Got flu and pneumococcal vaccine  Fluticasone and spiriva in the past    MEDICATIONS  (STANDING):  ALBUTerol/ipratropium for Nebulization 3 milliLiter(s) Nebulizer every 6 hours  ATENolol  Tablet 25 milliGRAM(s) Oral daily  atorvastatin 20 milliGRAM(s) Oral at bedtime  azithromycin   Tablet 500 milliGRAM(s) Oral daily  benzonatate 100 milliGRAM(s) Oral three times a day  ferrous    sulfate 325 milliGRAM(s) Oral daily  FLUoxetine 20 milliGRAM(s) Oral daily  furosemide    Tablet 40 milliGRAM(s) Oral daily  methylPREDNISolone sodium succinate Injectable 60 milliGRAM(s) IV Push every 8 hours  oxybutynin 5 milliGRAM(s) Oral daily  pantoprazole    Tablet 40 milliGRAM(s) Oral before breakfast  warfarin 3 milliGRAM(s) Oral once      MEDICATIONS  (PRN):  acetaminophen   Tablet .. 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  CHF (congestive heart failure)  GERD (gastroesophageal reflux disease)  COPD (chronic obstructive pulmonary disease)  HTN (hypertension)  Bleeding internal hemorrhoids  Pacemaker  Atrial fibrillation  Fall  TIA (transient ischemic attack): 2000  Status post Mohs surgery for basal cell carcinoma: left knee  Cardiac pacemaker  Presence of inferior vena cava filter  H/O atrioventricular eva ablation  Ptosis, both eyelids  Internal hemorrhoid: banded and cauterized  History of total knee arthroplasty, right  History of lumbar laminectomy: L3-4  History of cholecystectomy  H/O repair of right rotator cuff  Rectal adenoma: s/p excision 2005  S/P IVC filter: placed 1/9  Acute carpal tunnel syndrome: with surgery  H/O cardiac catheterization: 2005 &amp; 2007- no stents  Acquired ptosis of eyelid: 2014  S/P cataract surgery, left: June 2010  S/P cataract surgery, right: May 2010  S/P knee replacement, right: 2007  H/O laminectomy: 3/4 lumbar    2002  Complete tear of rotator cuff, right: 1998  S/P cholecystectomy: 1991  H/O total hysterectomy: 1971      FAMILY HISTORY:  Family history of coronary artery disease (Father)      SOCIAL HISTORY  Smoking History:     REVIEW OF SYSTEMS:    CONSTITUTIONAL:  No fevers, chills, sweats    HEENT:  Eyes:  No diplopia or blurred vision. ENT:  No earache, sore throat or runny nose.    CARDIOVASCULAR:  No pressure, squeezing, tightness, or heaviness about the chest; no palpitations.    RESPIRATORY:  No  PND or orthopnea. Mild SOBOE    GASTROINTESTINAL:  No abdominal pain, nausea, vomiting or diarrhea.    GENITOURINARY:  No dysuria, frequency or urgency.    NEUROLOGIC:  No paresthesias, fasciculations, seizures or weakness.    PSYCHIATRIC:  No disorder of thought or mood.    Vital Signs Last 24 Hrs  T(C): 36.6 (22 Jan 2019 07:32), Max: 36.8 (21 Jan 2019 16:55)  T(F): 97.8 (22 Jan 2019 07:32), Max: 98.2 (21 Jan 2019 16:55)  HR: 62 (22 Jan 2019 07:32) (61 - 82)  BP: 181/94 (22 Jan 2019 07:32) (135/70 - 181/94)  BP(mean): --  RR: 20 (22 Jan 2019 07:32) (20 - 22)  SpO2: 98% (22 Jan 2019 07:32) (95% - 99%)    PHYSICAL EXAMINATION:    GENERAL: The patient is a well-developed, well-nourished _____in no apparent distress.     HEENT: Head is normocephalic and atraumatic. Extraocular muscles are intact. Mucous membranes are moist.     NECK: Supple.     LUNGS: Diminished to auscultation without wheezing, rales, Scattered  rhonchi. Respirations unlabored    HEART: Regular rate and rhythm without murmur.    ABDOMEN: Soft, nontender, and nondistended.  No hepatosplenomegaly is noted.    EXTREMITIES: Without any cyanosis, clubbing, rash, lesions or edema.    NEUROLOGIC: Grossly intact.      LABS:                        11.4   1.9   )-----------( 166      ( 22 Jan 2019 07:34 )             35.7     01-22    139  |  103  |  20.0  ----------------------------<  149<H>  4.9   |  26.0  |  0.73    Ca    9.2      22 Jan 2019 07:34    TPro  7.4  /  Alb  4.2  /  TBili  0.7  /  DBili  x   /  AST  19  /  ALT  11  /  AlkPhos  46  01-21    PT/INR - ( 22 Jan 2019 07:34 )   PT: 32.0 sec;   INR: 2.70 ratio         PTT - ( 21 Jan 2019 19:31 )  PTT:37.0 sec    ABG - ( 21 Jan 2019 18:58 )  pH, Arterial: 7.46  pH, Blood: x     /  pCO2: 40    /  pO2: 120   / HCO3: 28    / Base Excess: 3.7   /  SaO2: 100               CARDIAC MARKERS ( 22 Jan 2019 00:14 )  x     / <0.01 ng/mL / 52 U/L / x     / x      CARDIAC MARKERS ( 21 Jan 2019 19:29 )  x     / <0.01 ng/mL / 54 U/L / x     / x            Serum Pro-Brain Natriuretic Peptide: 2711 pg/mL (01-21-19 @ 19:31)          MICROBIOLOGY: RVP pos    RADIOLOGY & ADDITIONAL STUDIES:    CXR on 1/21/19    IMPRESSION:   RIGHT lower lobe medial basilar airspace   consolidation/bronchiectasis..          CITLALLI CRISTOBAL M.D., ATTENDING RADIOLOGIST  This document has been electronically signed. Jan22 2019  6:31AM

## 2019-01-22 NOTE — PROGRESS NOTE ADULT - ASSESSMENT
> COPD/Asthma exacerbation - Continue Steroids, nebulizers, Azithromycin.  Symptoms improving.  Taper Solumedrol.  > PE/DVT S/p IVC filter, on anticoagulation.  > Chronic Afib S/p ablation - rate stable.  Adjust Coumadin dosing as pt on Azithromycin.  > Anemia Continue Ferrous SO4 325mg daily   > Chronic CHF Continue Lasix 40mg daily.  Outpatient cardiology followup for EF.  > HTN Atenolol 25mg   > Depression Continue Fluoxetine 20mg   > GERD PPI 40mg   DVT Proph - on anticoagulation.

## 2019-01-22 NOTE — PROGRESS NOTE ADULT - SUBJECTIVE AND OBJECTIVE BOX
Patient: YOSELYN PAULSON [id number] 85y Female                           Internal Medicine Hospitalist Progress Note    Initial HPI:  84 y/o female with PMH of asthma, COPD ( on home O2 2L),  afib s/p ablation on coumadin, came to the ED complaining of cough and shortness of breath. Patient said the cough has been going on for almost 1 month now, productive with white-yellowish sputum. She said she saw her pulmonologist recently and was treated with antibiotics but her symptoms persisted. Patient said her difficulty breathing worsen yesterday, any minimal exertion resulted in shortness of breath. She usually use her O2 at night but yesterday she used it all day and used her nebulizer with no relief. She said she was also recently started on Prednisone 10mg. She noted chest tightness with the cough. She has no fever, chills, palpittion, nausea/vomiting, abdominal pain, change in bowel/urinary habit, recent travel, calf pain. (21 Jan 2019 23:50)    Interval History:  Reports feeling better today.  Still with intermittent cough with yellow sputum.  No fevers.  At baseline on O2 at home.  No additional complaints.      ____________________PHYSICAL EXAM:  Vitals reviewed as indicated below  GENERAL:  NAD Alert and Oriented x 3   HEENT: NCAT  CARDIOVASCULAR:  S1, S2  LUNGS: coarse BS b/l  ABDOMEN:  soft, (-) tenderness, (-) distension, (+) bowel sounds, (-) guarding, (-) rebound (-) rigidity  EXTREMITIES:  no cyanosis / clubbing / edema.   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  CHF (congestive heart failure)  GERD (gastroesophageal reflux disease)  COPD (chronic obstructive pulmonary disease)  HTN (hypertension)  Bleeding internal hemorrhoids  Pacemaker  Atrial fibrillation  Fall  TIA (transient ischemic attack): 2000  Status post Mohs surgery for basal cell carcinoma: left knee  Cardiac pacemaker  Presence of inferior vena cava filter  H/O atrioventricular eva ablation  Ptosis, both eyelids  Internal hemorrhoid: banded and cauterized  History of total knee arthroplasty, right  History of lumbar laminectomy: L3-4  History of cholecystectomy  H/O repair of right rotator cuff  Rectal adenoma: s/p excision 2005  S/P IVC filter: placed 1/9  Acute carpal tunnel syndrome: with surgery  H/O cardiac catheterization: 2005 &amp; 2007- no stents  Acquired ptosis of eyelid: 2014  S/P cataract surgery, left: June 2010  S/P cataract surgery, right: May 2010  S/P knee replacement, right: 2007  H/O laminectomy: 3/4 lumbar    2002  Complete tear of rotator cuff, right: 1998  S/P cholecystectomy: 1991  H/O total hysterectomy: 1971        VITALS:  Vital Signs Last 24 Hrs  T(C): 36.6 (22 Jan 2019 07:32), Max: 36.8 (21 Jan 2019 16:55)  T(F): 97.8 (22 Jan 2019 07:32), Max: 98.2 (21 Jan 2019 16:55)  HR: 71 (22 Jan 2019 09:23) (61 - 82)  BP: 181/94 (22 Jan 2019 07:32) (135/70 - 181/94)  BP(mean): --  RR: 20 (22 Jan 2019 07:32) (20 - 22)  SpO2: 94% (22 Jan 2019 09:23) (94% - 99%) Daily Height in cm: 165.1 (21 Jan 2019 16:55)    Daily   CAPILLARY BLOOD GLUCOSE        I&O's Summary        LABS:                        11.4   1.9   )-----------( 166      ( 22 Jan 2019 07:34 )             35.7     01-22    139  |  103  |  20.0  ----------------------------<  149<H>  4.9   |  26.0  |  0.73    Ca    9.2      22 Jan 2019 07:34    TPro  7.4  /  Alb  4.2  /  TBili  0.7  /  DBili  x   /  AST  19  /  ALT  11  /  AlkPhos  46  01-21    PT/INR - ( 22 Jan 2019 07:34 )   PT: 32.0 sec;   INR: 2.70 ratio         PTT - ( 21 Jan 2019 19:31 )  PTT:37.0 sec  LIVER FUNCTIONS - ( 21 Jan 2019 19:29 )  Alb: 4.2 g/dL / Pro: 7.4 g/dL / ALK PHOS: 46 U/L / ALT: 11 U/L / AST: 19 U/L / GGT: x             CARDIAC MARKERS ( 22 Jan 2019 00:14 )  x     / <0.01 ng/mL / 52 U/L / x     / x      CARDIAC MARKERS ( 21 Jan 2019 19:29 )  x     / <0.01 ng/mL / 54 U/L / x     / x              MEDICATIONS:  MEDICATIONS  (STANDING):  ALBUTerol/ipratropium for Nebulization 3 milliLiter(s) Nebulizer every 6 hours  amLODIPine   Tablet 5 milliGRAM(s) Oral daily  ATENolol  Tablet 25 milliGRAM(s) Oral daily  atorvastatin 20 milliGRAM(s) Oral at bedtime  azithromycin   Tablet 500 milliGRAM(s) Oral daily  benzonatate 100 milliGRAM(s) Oral three times a day  ferrous    sulfate 325 milliGRAM(s) Oral daily  FLUoxetine 20 milliGRAM(s) Oral daily  furosemide    Tablet 40 milliGRAM(s) Oral daily  methylPREDNISolone sodium succinate Injectable 40 milliGRAM(s) IV Push every 8 hours  oxybutynin 5 milliGRAM(s) Oral daily  pantoprazole    Tablet 40 milliGRAM(s) Oral before breakfast  warfarin 3 milliGRAM(s) Oral once    MEDICATIONS  (PRN):  acetaminophen   Tablet .. 650 milliGRAM(s) Oral every 6 hours PRN Temp greater or equal to 38C (100.4F), Moderate Pain (4 - 6)

## 2019-01-22 NOTE — CONSULT NOTE ADULT - ASSESSMENT
abdominal pain
Assess    AECOPD  Post viral pneumonia  Untreated JUAN      Rec    Neb  Steroid taper  Abx  02  OP FU with Dr William

## 2019-01-23 LAB
APTT BLD: 38.4 SEC — HIGH (ref 27.5–36.3)
INR BLD: 3.26 RATIO — HIGH (ref 0.88–1.16)
PROTHROM AB SERPL-ACNC: 38.9 SEC — HIGH (ref 10–12.9)

## 2019-01-23 PROCEDURE — 99233 SBSQ HOSP IP/OBS HIGH 50: CPT

## 2019-01-23 PROCEDURE — 99232 SBSQ HOSP IP/OBS MODERATE 35: CPT

## 2019-01-23 RX ORDER — FERROUS SULFATE 325(65) MG
325 TABLET ORAL
Qty: 0 | Refills: 0 | COMMUNITY

## 2019-01-23 RX ORDER — BUDESONIDE, MICRONIZED 100 %
1 POWDER (GRAM) MISCELLANEOUS
Qty: 0 | Refills: 0 | COMMUNITY

## 2019-01-23 RX ORDER — SACCHAROMYCES BOULARDII 250 MG
250 POWDER IN PACKET (EA) ORAL
Qty: 0 | Refills: 0 | Status: DISCONTINUED | OUTPATIENT
Start: 2019-01-23 | End: 2019-01-24

## 2019-01-23 RX ORDER — ERGOCALCIFEROL 1.25 MG/1
2000 CAPSULE ORAL
Qty: 0 | Refills: 0 | COMMUNITY

## 2019-01-23 RX ORDER — ATORVASTATIN CALCIUM 80 MG/1
20 TABLET, FILM COATED ORAL
Qty: 0 | Refills: 0 | COMMUNITY

## 2019-01-23 RX ADMIN — Medication 20 MILLIGRAM(S): at 11:39

## 2019-01-23 RX ADMIN — ATORVASTATIN CALCIUM 20 MILLIGRAM(S): 80 TABLET, FILM COATED ORAL at 22:36

## 2019-01-23 RX ADMIN — AMLODIPINE BESYLATE 5 MILLIGRAM(S): 2.5 TABLET ORAL at 05:56

## 2019-01-23 RX ADMIN — Medication 5 MILLIGRAM(S): at 11:39

## 2019-01-23 RX ADMIN — Medication 3 MILLILITER(S): at 04:19

## 2019-01-23 RX ADMIN — Medication 3 MILLILITER(S): at 15:50

## 2019-01-23 RX ADMIN — Medication 40 MILLIGRAM(S): at 05:57

## 2019-01-23 RX ADMIN — Medication 40 MILLIGRAM(S): at 17:26

## 2019-01-23 RX ADMIN — AZITHROMYCIN 500 MILLIGRAM(S): 500 TABLET, FILM COATED ORAL at 11:39

## 2019-01-23 RX ADMIN — Medication 3 MILLILITER(S): at 19:58

## 2019-01-23 RX ADMIN — Medication 40 MILLIGRAM(S): at 05:56

## 2019-01-23 RX ADMIN — ATENOLOL 25 MILLIGRAM(S): 25 TABLET ORAL at 05:57

## 2019-01-23 RX ADMIN — Medication 325 MILLIGRAM(S): at 11:39

## 2019-01-23 RX ADMIN — PANTOPRAZOLE SODIUM 40 MILLIGRAM(S): 20 TABLET, DELAYED RELEASE ORAL at 05:57

## 2019-01-23 RX ADMIN — Medication 100 MILLIGRAM(S): at 17:26

## 2019-01-23 RX ADMIN — Medication 250 MILLIGRAM(S): at 17:26

## 2019-01-23 RX ADMIN — Medication 3 MILLILITER(S): at 07:42

## 2019-01-23 NOTE — PROGRESS NOTE ADULT - ASSESSMENT
> COPD/Asthma exacerbation - Continue Steroids, nebulizers, Azithromycin.  Symptoms improving.  Taper Solumedrol.  Pulmonary input appreciated.  OOB with PT.    > PE/DVT S/p IVC filter, on anticoagulation.  > Chronic Afib S/p ablation - rate stable.  Hold Coumadin today as INR increasing.    > Anemia Continue Ferrous SO4 325mg daily   > Chronic CHF Continue Lasix 40mg daily.  Outpatient cardiology followup for EF.  > HTN Atenolol 25mg   > Depression Continue Fluoxetine 20mg   > GERD PPI 40mg   DVT Proph - on anticoagulation.

## 2019-01-23 NOTE — PHYSICAL THERAPY INITIAL EVALUATION ADULT - IMPAIRMENTS FOUND, PT EVAL
gait, locomotion, and balance/muscle strength/anthropometric characteristics/aerobic capacity/endurance/gross motor

## 2019-01-23 NOTE — PROGRESS NOTE ADULT - SUBJECTIVE AND OBJECTIVE BOX
PULMONARY PROGRESS NOTE      [id number]    Patient is a 85y old  Female who presents with a chief complaint of COPD exacerbation (22 Jan 2019 10:34)      INTERVAL HPI/OVERNIGHT EVENTS:  Feels better  Decreased cough  Decreased shortness of breath  Able to get some sleep for first time in days    MEDICATIONS  (STANDING):  ALBUTerol/ipratropium for Nebulization 3 milliLiter(s) Nebulizer every 6 hours  amLODIPine   Tablet 5 milliGRAM(s) Oral daily  ATENolol  Tablet 25 milliGRAM(s) Oral daily  atorvastatin 20 milliGRAM(s) Oral at bedtime  azithromycin   Tablet 500 milliGRAM(s) Oral daily  benzonatate 100 milliGRAM(s) Oral three times a day  ferrous    sulfate 325 milliGRAM(s) Oral daily  FLUoxetine 20 milliGRAM(s) Oral daily  furosemide    Tablet 40 milliGRAM(s) Oral daily  methylPREDNISolone sodium succinate Injectable 40 milliGRAM(s) IV Push every 12 hours  oxybutynin 5 milliGRAM(s) Oral daily  pantoprazole    Tablet 40 milliGRAM(s) Oral before breakfast      MEDICATIONS  (PRN):  acetaminophen   Tablet .. 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  CHF (congestive heart failure)  GERD (gastroesophageal reflux disease)  COPD (chronic obstructive pulmonary disease)  HTN (hypertension)  Bleeding internal hemorrhoids  Pacemaker  Atrial fibrillation  Fall  TIA (transient ischemic attack): 2000  Status post Mohs surgery for basal cell carcinoma: left knee  Cardiac pacemaker  Presence of inferior vena cava filter  H/O atrioventricular eva ablation  Ptosis, both eyelids  Internal hemorrhoid: banded and cauterized  History of total knee arthroplasty, right  History of lumbar laminectomy: L3-4  History of cholecystectomy  H/O repair of right rotator cuff  Rectal adenoma: s/p excision 2005  S/P IVC filter: placed 1/9  Acute carpal tunnel syndrome: with surgery  H/O cardiac catheterization: 2005 &amp; 2007- no stents  Acquired ptosis of eyelid: 2014  S/P cataract surgery, left: June 2010  S/P cataract surgery, right: May 2010  S/P knee replacement, right: 2007  H/O laminectomy: 3/4 lumbar    2002  Complete tear of rotator cuff, right: 1998  S/P cholecystectomy: 1991  H/O total hysterectomy: 1971      SOCIAL HISTORY  Smoking History:Former       REVIEW OF SYSTEMS:    CONSTITUTIONAL:  No distress    HEENT:  Eyes:  No diplopia or blurred vision. ENT:  No earache, sore throat or runny nose.    CARDIOVASCULAR:  No pressure, squeezing, tightness, heaviness or aching about the chest; no palpitations.    RESPIRATORY: Some nonproductive cough; per HPI    GASTROINTESTINAL:  No nausea, vomiting or diarrhea.    GENITOURINARY:  No dysuria, frequency or urgency.    MUSCULOSKELETAL:  No joint pain    SKIN:  No new lesions.    NEUROLOGIC:  No paresthesias, fasciculations, seizures or weakness.    PSYCHIATRIC:  No disorder of thought or mood.    ENDOCRINE:  No heat or cold intolerance, polyuria or polydipsia.    HEMATOLOGICAL:  No easy bruising or bleeding.     Vital Signs Last 24 Hrs  T(C): 36.9 (23 Jan 2019 07:35), Max: 36.9 (23 Jan 2019 07:35)  T(F): 98.4 (23 Jan 2019 07:35), Max: 98.4 (23 Jan 2019 07:35)  HR: 62 (23 Jan 2019 07:42) (62 - 76)  BP: 160/78 (23 Jan 2019 07:35) (116/71 - 160/78)  BP(mean): --  RR: 18 (23 Jan 2019 07:35) (16 - 20)  SpO2: 98% (23 Jan 2019 07:42) (94% - 98%)    PHYSICAL EXAMINATION:    GENERAL: The patient is awake and alert in no apparent distress.     HEENT: Head is normocephalic and atraumatic. Extraocular muscles are intact. Mucous membranes are moist.    NECK: Supple.    LUNGS: Bilateral scattered areas of expiratory rhonchi but good air entry and fairly good E/I    HEART: Regular rate and rhythm without murmur.    ABDOMEN: Soft, nontender, and nondistended.      EXTREMITIES: Without any cyanosis, clubbing, rash, lesions or edema.    NEUROLOGIC: Grossly intact.    SKIN: No ulceration or induration present.      LABS:                        11.4   1.9   )-----------( 166      ( 22 Jan 2019 07:34 )             35.7     01-22    139  |  103  |  20.0  ----------------------------<  149<H>  4.9   |  26.0  |  0.73    Ca    9.2      22 Jan 2019 07:34    TPro  7.4  /  Alb  4.2  /  TBili  0.7  /  DBili  x   /  AST  19  /  ALT  11  /  AlkPhos  46  01-21    PT/INR - ( 23 Jan 2019 06:51 )   PT: 38.9 sec;   INR: 3.26 ratio         PTT - ( 23 Jan 2019 06:51 )  PTT:38.4 sec    ABG - ( 21 Jan 2019 18:58 )  pH, Arterial: 7.46  pH, Blood: x     /  pCO2: 40    /  pO2: 120   / HCO3: 28    / Base Excess: 3.7   /  SaO2: 100               CARDIAC MARKERS ( 22 Jan 2019 00:14 )  x     / <0.01 ng/mL / 52 U/L / x     / x      CARDIAC MARKERS ( 21 Jan 2019 19:29 )  x     / <0.01 ng/mL / 54 U/L / x     / x            Serum Pro-Brain Natriuretic Peptide: 2711 pg/mL (01-21-19 @ 19:31)          MICROBIOLOGY:    RADIOLOGY & ADDITIONAL STUDIES:

## 2019-01-23 NOTE — PROGRESS NOTE ADULT - ASSESSMENT
Exacerbation of COPD improved  Cough decreased   Dyspnea improved    Plan:  1.Have decreased solumedrol to q 12  2.If continues to improved>change to PO prednisone tomorrow and consider d/c with prednisone taper from 60 mg  3.F/u with  post d/c  4.Continue nebs  5.Titrate O2

## 2019-01-23 NOTE — PHARMACOTHERAPY INTERVENTION NOTE - COMMENTS
Outpatient medication reconciliation completed and updated
Pt on warfarin 4mg daily at home, but currently on azithromycin antibiotic therapy that causes drug interaction. Recommend 50% warfarin dose decrease.
Azithromycin PO 500mg x 3days thru 1/24 (started 1/22)

## 2019-01-24 LAB
APTT BLD: 33.9 SEC — SIGNIFICANT CHANGE UP (ref 27.5–36.3)
INR BLD: 2.71 RATIO — HIGH (ref 0.88–1.16)
PROTHROM AB SERPL-ACNC: 32.1 SEC — HIGH (ref 10–12.9)

## 2019-01-24 PROCEDURE — 99232 SBSQ HOSP IP/OBS MODERATE 35: CPT

## 2019-01-24 PROCEDURE — 99233 SBSQ HOSP IP/OBS HIGH 50: CPT

## 2019-01-24 RX ORDER — FAMOTIDINE 10 MG/ML
40 INJECTION INTRAVENOUS AT BEDTIME
Qty: 0 | Refills: 0 | Status: DISCONTINUED | OUTPATIENT
Start: 2019-01-24 | End: 2019-01-28

## 2019-01-24 RX ADMIN — Medication 3 MILLILITER(S): at 14:30

## 2019-01-24 RX ADMIN — Medication 40 MILLIGRAM(S): at 05:51

## 2019-01-24 RX ADMIN — Medication 5 MILLIGRAM(S): at 09:56

## 2019-01-24 RX ADMIN — PANTOPRAZOLE SODIUM 40 MILLIGRAM(S): 20 TABLET, DELAYED RELEASE ORAL at 08:29

## 2019-01-24 RX ADMIN — AMLODIPINE BESYLATE 5 MILLIGRAM(S): 2.5 TABLET ORAL at 05:51

## 2019-01-24 RX ADMIN — Medication 40 MILLIGRAM(S): at 17:43

## 2019-01-24 RX ADMIN — Medication 100 MILLIGRAM(S): at 15:26

## 2019-01-24 RX ADMIN — AZITHROMYCIN 500 MILLIGRAM(S): 500 TABLET, FILM COATED ORAL at 08:30

## 2019-01-24 RX ADMIN — Medication 3 MILLILITER(S): at 09:32

## 2019-01-24 RX ADMIN — Medication 100 MILLIGRAM(S): at 02:35

## 2019-01-24 RX ADMIN — ATORVASTATIN CALCIUM 20 MILLIGRAM(S): 80 TABLET, FILM COATED ORAL at 21:43

## 2019-01-24 RX ADMIN — Medication 3 MILLILITER(S): at 19:52

## 2019-01-24 RX ADMIN — Medication 100 MILLIGRAM(S): at 09:55

## 2019-01-24 RX ADMIN — Medication 250 MILLIGRAM(S): at 05:51

## 2019-01-24 RX ADMIN — Medication 20 MILLIGRAM(S): at 09:56

## 2019-01-24 RX ADMIN — Medication 325 MILLIGRAM(S): at 09:56

## 2019-01-24 RX ADMIN — Medication 100 MILLIGRAM(S): at 21:43

## 2019-01-24 RX ADMIN — FAMOTIDINE 40 MILLIGRAM(S): 10 INJECTION INTRAVENOUS at 21:43

## 2019-01-24 RX ADMIN — Medication 3 MILLILITER(S): at 03:13

## 2019-01-24 RX ADMIN — ATENOLOL 25 MILLIGRAM(S): 25 TABLET ORAL at 05:51

## 2019-01-24 NOTE — PROGRESS NOTE ADULT - ASSESSMENT
COPD exacerbation  Process may be exacerbated by uncontrolled GERD based on history    Plan:  1.Continue current steroids  2.Nebs  3.Antitussives  4.Add famotidine to pantoprazole  5.Should consider GI input in view of dysphagia.

## 2019-01-24 NOTE — PROGRESS NOTE ADULT - SUBJECTIVE AND OBJECTIVE BOX
PULMONARY PROGRESS NOTE      [id number]    Patient is a 85y old  Female who presents with a chief complaint of COPD exacerbation (23 Jan 2019 10:13)      INTERVAL HPI/OVERNIGHT EVENTS:  Still with cough  No dyspnea between cough  Had episode where "food got stuck">induced vomiting and relieved symptoms  Has h/o GERD>had been on omeprazole, zantac and carafate which she reduced recently>currently only on pantoprazole    MEDICATIONS  (STANDING):  ALBUTerol/ipratropium for Nebulization 3 milliLiter(s) Nebulizer every 6 hours  amLODIPine   Tablet 5 milliGRAM(s) Oral daily  ATENolol  Tablet 25 milliGRAM(s) Oral daily  atorvastatin 20 milliGRAM(s) Oral at bedtime  benzonatate 100 milliGRAM(s) Oral three times a day  famotidine    Tablet 40 milliGRAM(s) Oral at bedtime  ferrous    sulfate 325 milliGRAM(s) Oral daily  FLUoxetine 20 milliGRAM(s) Oral daily  furosemide    Tablet 40 milliGRAM(s) Oral daily  methylPREDNISolone sodium succinate Injectable 40 milliGRAM(s) IV Push every 12 hours  oxybutynin 5 milliGRAM(s) Oral daily  pantoprazole    Tablet 40 milliGRAM(s) Oral before breakfast  saccharomyces boulardii 250 milliGRAM(s) Oral two times a day      MEDICATIONS  (PRN):  acetaminophen   Tablet .. 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  CHF (congestive heart failure)  GERD (gastroesophageal reflux disease)  COPD (chronic obstructive pulmonary disease)  HTN (hypertension)  Bleeding internal hemorrhoids  Pacemaker  Atrial fibrillation  Fall  TIA (transient ischemic attack): 2000  Status post Mohs surgery for basal cell carcinoma: left knee  Cardiac pacemaker  Presence of inferior vena cava filter  H/O atrioventricular eva ablation  Ptosis, both eyelids  Internal hemorrhoid: banded and cauterized  History of total knee arthroplasty, right  History of lumbar laminectomy: L3-4  History of cholecystectomy  H/O repair of right rotator cuff  Rectal adenoma: s/p excision 2005  S/P IVC filter: placed 1/9  Acute carpal tunnel syndrome: with surgery  H/O cardiac catheterization: 2005 &amp; 2007- no stents  Acquired ptosis of eyelid: 2014  S/P cataract surgery, left: June 2010  S/P cataract surgery, right: May 2010  S/P knee replacement, right: 2007  H/O laminectomy: 3/4 lumbar    2002  Complete tear of rotator cuff, right: 1998  S/P cholecystectomy: 1991  H/O total hysterectomy: 1971      SOCIAL HISTORY  Smoking History:       REVIEW OF SYSTEMS:    CONSTITUTIONAL:  No distress    HEENT:  Eyes:  No diplopia or blurred vision. ENT:  No earache, sore throat or runny nose.    CARDIOVASCULAR:  No pressure, squeezing, tightness, heaviness or aching about the chest; no palpitations.    RESPIRATORY:  Per HPI    GASTROINTESTINAL: Per HPI    GENITOURINARY:  No dysuria, frequency or urgency.    MUSCULOSKELETAL:  No joint pain    SKIN:  No new lesions.    NEUROLOGIC:  No paresthesias, fasciculations, seizures or weakness.    PSYCHIATRIC:  No disorder of thought or mood.    ENDOCRINE:  No heat or cold intolerance, polyuria or polydipsia.    HEMATOLOGICAL:  No easy bruising or bleeding.     Vital Signs Last 24 Hrs  T(C): 36.8 (24 Jan 2019 07:32), Max: 36.9 (23 Jan 2019 19:43)  T(F): 98.2 (24 Jan 2019 07:32), Max: 98.4 (23 Jan 2019 19:43)  HR: 64 (24 Jan 2019 07:32) (60 - 72)  BP: 142/77 (24 Jan 2019 07:32) (119/62 - 142/77)  BP(mean): --  RR: 18 (24 Jan 2019 07:32) (18 - 18)  SpO2: 96% (24 Jan 2019 07:32) (95% - 99%)    PHYSICAL EXAMINATION:    GENERAL: The patient is awake and alert in no apparent distress.     HEENT: Head is normocephalic and atraumatic. Extraocular muscles are intact. Mucous membranes are moist.    NECK: Supple.    LUNGS: Largely clear, scattered rhonchi    HEART: Regular rate and rhythm without murmur.    ABDOMEN: Soft, nontender, and nondistended.      EXTREMITIES: Without any cyanosis, clubbing, rash, lesions or edema.    NEUROLOGIC: Grossly intact.    SKIN: No ulceration or induration present.      LABS:          PT/INR - ( 24 Jan 2019 07:44 )   PT: 32.1 sec;   INR: 2.71 ratio         PTT - ( 24 Jan 2019 07:44 )  PTT:33.9 sec            Serum Pro-Brain Natriuretic Peptide: 2711 pg/mL (01-21-19 @ 19:31)          MICROBIOLOGY:    RADIOLOGY & ADDITIONAL STUDIES:

## 2019-01-24 NOTE — PROGRESS NOTE ADULT - SUBJECTIVE AND OBJECTIVE BOX
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  CHF (congestive heart failure)  GERD (gastroesophageal reflux disease)  COPD (chronic obstructive pulmonary disease)  HTN (hypertension)  Bleeding internal hemorrhoids  Pacemaker  Atrial fibrillation  Fall  TIA (transient ischemic attack): 2000  Status post Mohs surgery for basal cell carcinoma: left knee  Cardiac pacemaker  Presence of inferior vena cava filter  H/O atrioventricular eva ablation  Ptosis, both eyelids  Internal hemorrhoid: banded and cauterized  History of total knee arthroplasty, right  History of lumbar laminectomy: L3-4  History of cholecystectomy  H/O repair of right rotator cuff  Rectal adenoma: s/p excision 2005  S/P IVC filter: placed 1/9  Acute carpal tunnel syndrome: with surgery  H/O cardiac catheterization: 2005 &amp; 2007- no stents  Acquired ptosis of eyelid: 2014  S/P cataract surgery, left: June 2010  S/P cataract surgery, right: May 2010  S/P knee replacement, right: 2007  H/O laminectomy: 3/4 lumbar    2002  Complete tear of rotator cuff, right: 1998  S/P cholecystectomy: 1991  H/O total hysterectomy: 1971      HPI/OVERNIGHT EVENTS: Patient with loud wheeze    MEDICATIONS  (STANDING):  ALBUTerol/ipratropium for Nebulization 3 milliLiter(s) Nebulizer every 6 hours  amLODIPine   Tablet 5 milliGRAM(s) Oral daily  ATENolol  Tablet 25 milliGRAM(s) Oral daily  atorvastatin 20 milliGRAM(s) Oral at bedtime  benzonatate 100 milliGRAM(s) Oral three times a day  famotidine    Tablet 40 milliGRAM(s) Oral at bedtime  ferrous    sulfate 325 milliGRAM(s) Oral daily  FLUoxetine 20 milliGRAM(s) Oral daily  furosemide    Tablet 40 milliGRAM(s) Oral daily  oxybutynin 5 milliGRAM(s) Oral daily  pantoprazole    Tablet 40 milliGRAM(s) Oral before breakfast  predniSONE   Tablet 40 milliGRAM(s) Oral daily      Vital Signs Last 24 Hrs  T(C): 36.7 (25 Jan 2019 10:48), Max: 36.8 (24 Jan 2019 16:24)  T(F): 98.1 (25 Jan 2019 10:48), Max: 98.3 (24 Jan 2019 16:24)  HR: 63 (25 Jan 2019 11:59) (60 - 68)  BP: 163/79 (25 Jan 2019 10:48) (128/71 - 163/79)  BP(mean): --  RR: 20 (25 Jan 2019 10:48) (18 - 20)  SpO2: 98% (25 Jan 2019 13:19) (92% - 98%)    PHYSICAL EXAM:  HEENT: PERRLA, EOMI, Normal Hearing  Neck: No LAD, No JVD  Back: No CVA tenderness  Respiratory: CTAB Cardiovascular: S1 and S2, RRR, no M/G/R  Gastrointestinal: BS+, soft, NT/ND  Extremities: No peripheral edema  Vascular: 2+ peripheral pulses  Neurological: A/O x 3, no focal deficits        CAPILLARY BLOOD GLUCOSE    LABS:          PT/INR - ( 25 Jan 2019 06:34 )   PT: 37.9 sec;   INR: 3.18 ratio         PTT - ( 25 Jan 2019 06:34 )  PTT:36.6 sec      RADIOLOGY & ADDITIONAL TESTS:

## 2019-01-25 DIAGNOSIS — I50.32 CHRONIC DIASTOLIC (CONGESTIVE) HEART FAILURE: ICD-10-CM

## 2019-01-25 DIAGNOSIS — I48.91 UNSPECIFIED ATRIAL FIBRILLATION: ICD-10-CM

## 2019-01-25 DIAGNOSIS — D64.9 ANEMIA, UNSPECIFIED: ICD-10-CM

## 2019-01-25 DIAGNOSIS — J44.1 CHRONIC OBSTRUCTIVE PULMONARY DISEASE WITH (ACUTE) EXACERBATION: ICD-10-CM

## 2019-01-25 DIAGNOSIS — J18.1 LOBAR PNEUMONIA, UNSPECIFIED ORGANISM: ICD-10-CM

## 2019-01-25 LAB
APTT BLD: 36.6 SEC — HIGH (ref 27.5–36.3)
INR BLD: 3.18 RATIO — HIGH (ref 0.88–1.16)
PROTHROM AB SERPL-ACNC: 37.9 SEC — HIGH (ref 10–12.9)

## 2019-01-25 PROCEDURE — 99233 SBSQ HOSP IP/OBS HIGH 50: CPT

## 2019-01-25 RX ADMIN — AMLODIPINE BESYLATE 5 MILLIGRAM(S): 2.5 TABLET ORAL at 05:37

## 2019-01-25 RX ADMIN — Medication 5 MILLIGRAM(S): at 13:04

## 2019-01-25 RX ADMIN — Medication 40 MILLIGRAM(S): at 12:39

## 2019-01-25 RX ADMIN — Medication 20 MILLIGRAM(S): at 12:39

## 2019-01-25 RX ADMIN — Medication 40 MILLIGRAM(S): at 05:37

## 2019-01-25 RX ADMIN — Medication 3 MILLILITER(S): at 15:20

## 2019-01-25 RX ADMIN — PANTOPRAZOLE SODIUM 40 MILLIGRAM(S): 20 TABLET, DELAYED RELEASE ORAL at 05:38

## 2019-01-25 RX ADMIN — Medication 3 MILLILITER(S): at 10:19

## 2019-01-25 RX ADMIN — Medication 100 MILLIGRAM(S): at 12:39

## 2019-01-25 RX ADMIN — Medication 3 MILLILITER(S): at 20:36

## 2019-01-25 RX ADMIN — ATORVASTATIN CALCIUM 20 MILLIGRAM(S): 80 TABLET, FILM COATED ORAL at 21:36

## 2019-01-25 RX ADMIN — Medication 3 MILLILITER(S): at 03:06

## 2019-01-25 RX ADMIN — Medication 100 MILLIGRAM(S): at 05:37

## 2019-01-25 RX ADMIN — FAMOTIDINE 40 MILLIGRAM(S): 10 INJECTION INTRAVENOUS at 21:37

## 2019-01-25 RX ADMIN — Medication 40 MILLIGRAM(S): at 05:38

## 2019-01-25 RX ADMIN — ATENOLOL 25 MILLIGRAM(S): 25 TABLET ORAL at 05:37

## 2019-01-25 RX ADMIN — Medication 325 MILLIGRAM(S): at 12:39

## 2019-01-25 RX ADMIN — Medication 100 MILLIGRAM(S): at 21:36

## 2019-01-25 NOTE — PROGRESS NOTE ADULT - ASSESSMENT
Imp--COPD exac due to RSV.  Clinically improving  Bronchiectasis.  Plan--change to po prednisone  Ambulate pt  poss d/c in AM.

## 2019-01-25 NOTE — PROGRESS NOTE ADULT - SUBJECTIVE AND OBJECTIVE BOX
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  CHF (congestive heart failure)  GERD (gastroesophageal reflux disease)  COPD (chronic obstructive pulmonary disease)  HTN (hypertension)  Bleeding internal hemorrhoids  Pacemaker  Atrial fibrillation  Fall  TIA (transient ischemic attack): 2000  Status post Mohs surgery for basal cell carcinoma: left knee  Cardiac pacemaker  Presence of inferior vena cava filter  H/O atrioventricular eva ablation  Ptosis, both eyelids  Internal hemorrhoid: banded and cauterized  History of total knee arthroplasty, right  History of lumbar laminectomy: L3-4  History of cholecystectomy  H/O repair of right rotator cuff  Rectal adenoma: s/p excision 2005  S/P IVC filter: placed 1/9  Acute carpal tunnel syndrome: with surgery  H/O cardiac catheterization: 2005 &amp; 2007- no stents  Acquired ptosis of eyelid: 2014  S/P cataract surgery, left: June 2010  S/P cataract surgery, right: May 2010  S/P knee replacement, right: 2007  H/O laminectomy: 3/4 lumbar    2002  Complete tear of rotator cuff, right: 1998  S/P cholecystectomy: 1991  H/O total hysterectomy: 1971      HPI/OVERNIGHT EVENTS:    MEDICATIONS  (STANDING):  ALBUTerol/ipratropium for Nebulization 3 milliLiter(s) Nebulizer every 6 hours  amLODIPine   Tablet 5 milliGRAM(s) Oral daily  ATENolol  Tablet 25 milliGRAM(s) Oral daily  atorvastatin 20 milliGRAM(s) Oral at bedtime  benzonatate 100 milliGRAM(s) Oral three times a day  famotidine    Tablet 40 milliGRAM(s) Oral at bedtime  ferrous    sulfate 325 milliGRAM(s) Oral daily  FLUoxetine 20 milliGRAM(s) Oral daily  furosemide    Tablet 40 milliGRAM(s) Oral daily  oxybutynin 5 milliGRAM(s) Oral daily  pantoprazole    Tablet 40 milliGRAM(s) Oral before breakfast  predniSONE   Tablet 40 milliGRAM(s) Oral daily      Vital Signs Last 24 Hrs  T(C): 36.7 (25 Jan 2019 10:48), Max: 36.8 (24 Jan 2019 16:24)  T(F): 98.1 (25 Jan 2019 10:48), Max: 98.3 (24 Jan 2019 16:24)  HR: 63 (25 Jan 2019 11:59) (60 - 68)  BP: 163/79 (25 Jan 2019 10:48) (128/71 - 163/79)  BP(mean): --  RR: 20 (25 Jan 2019 10:48) (18 - 20)  SpO2: 98% (25 Jan 2019 13:19) (92% - 98%)    PHYSICAL EXAM:  HEENT: PERRLA, EOMI, Normal Hearing  Neck: No LAD, No JVD  Back: No CVA tenderness  Respiratory: CTAB Cardiovascular: S1 and S2, RRR, no M/G/R  Gastrointestinal: BS+, soft, NT/ND  Extremities: No peripheral edema  Vascular: 2+ peripheral pulses  Neurological: A/O x 3, no focal deficits  Patient continues to cough        CAPILLARY BLOOD GLUCOSE    LABS:          PT/INR - ( 25 Jan 2019 06:34 )   PT: 37.9 sec;   INR: 3.18 ratio         PTT - ( 25 Jan 2019 06:34 )  PTT:36.6 sec      RADIOLOGY & ADDITIONAL TESTS:

## 2019-01-25 NOTE — PROGRESS NOTE ADULT - SUBJECTIVE AND OBJECTIVE BOX
PULMONARY PROGRESS NOTE      [id number]    Patient is a 85y old  Female who presents with a chief complaint of COPD exacerbation (24 Jan 2019 08:47)      INTERVAL HPI/OVERNIGHT EVENTS:Ambulating without SOB.  Wet cough, nonproductive as yet.    MEDICATIONS  (STANDING):  ALBUTerol/ipratropium for Nebulization 3 milliLiter(s) Nebulizer every 6 hours  amLODIPine   Tablet 5 milliGRAM(s) Oral daily  ATENolol  Tablet 25 milliGRAM(s) Oral daily  atorvastatin 20 milliGRAM(s) Oral at bedtime  benzonatate 100 milliGRAM(s) Oral three times a day  famotidine    Tablet 40 milliGRAM(s) Oral at bedtime  ferrous    sulfate 325 milliGRAM(s) Oral daily  FLUoxetine 20 milliGRAM(s) Oral daily  furosemide    Tablet 40 milliGRAM(s) Oral daily  methylPREDNISolone sodium succinate Injectable 40 milliGRAM(s) IV Push every 12 hours  oxybutynin 5 milliGRAM(s) Oral daily  pantoprazole    Tablet 40 milliGRAM(s) Oral before breakfast      MEDICATIONS  (PRN):  acetaminophen   Tablet .. 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  CHF (congestive heart failure)  GERD (gastroesophageal reflux disease)  COPD (chronic obstructive pulmonary disease)  HTN (hypertension)  Bleeding internal hemorrhoids  Pacemaker  Atrial fibrillation  Fall  TIA (transient ischemic attack): 2000  Status post Mohs surgery for basal cell carcinoma: left knee  Cardiac pacemaker  Presence of inferior vena cava filter  H/O atrioventricular eva ablation  Ptosis, both eyelids  Internal hemorrhoid: banded and cauterized  History of total knee arthroplasty, right  History of lumbar laminectomy: L3-4  History of cholecystectomy  H/O repair of right rotator cuff  Rectal adenoma: s/p excision 2005  S/P IVC filter: placed 1/9  Acute carpal tunnel syndrome: with surgery  H/O cardiac catheterization: 2005 &amp; 2007- no stents  Acquired ptosis of eyelid: 2014  S/P cataract surgery, left: June 2010  S/P cataract surgery, right: May 2010  S/P knee replacement, right: 2007  H/O laminectomy: 3/4 lumbar    2002  Complete tear of rotator cuff, right: 1998  S/P cholecystectomy: 1991  H/O total hysterectomy: 1971      SOCIAL HISTORY  Smoking History:       REVIEW OF SYSTEMS:    CONSTITUTIONAL:  No distress    HEENT:  Eyes:  No diplopia or blurred vision. ENT:  No earache, sore throat or runny nose.    CARDIOVASCULAR:  No pressure, squeezing, tightness, heaviness or aching about the chest; no palpitations.    RESPIRATORY:  per hpi    GASTROINTESTINAL:  No nausea, vomiting or diarrhea.    GENITOURINARY:  No dysuria, frequency or urgency.    MUSCULOSKELETAL:  No joint pain    SKIN:  No new lesions.    NEUROLOGIC:  No paresthesias, fasciculations, seizures or weakness.    PSYCHIATRIC:  No disorder of thought or mood.    ENDOCRINE:  No heat or cold intolerance, polyuria or polydipsia.    HEMATOLOGICAL:  No easy bruising or bleeding.     Vital Signs Last 24 Hrs  T(C): 36.7 (25 Jan 2019 10:48), Max: 36.8 (24 Jan 2019 16:24)  T(F): 98.1 (25 Jan 2019 10:48), Max: 98.3 (24 Jan 2019 16:24)  HR: 66 (25 Jan 2019 10:48) (59 - 68)  BP: 163/79 (25 Jan 2019 10:48) (111/71 - 163/79)  BP(mean): --  RR: 20 (25 Jan 2019 10:48) (18 - 20)  SpO2: 97% (25 Jan 2019 09:45) (92% - 98%)    PHYSICAL EXAMINATION:    GENERAL: The patient is awake and alert in no apparent distress.     HEENT: Head is normocephalic and atraumatic. Extraocular muscles are intact. Mucous membranes are moist.    NECK: Supple.    LUNGS:Good air movement.  scattered rhonchi  HEART: Regular rate and rhythm without murmur.    ABDOMEN: Soft, nontender, and nondistended.      EXTREMITIES: Without any cyanosis, clubbing, rash, lesions or edema.    NEUROLOGIC: Grossly intact.    SKIN: No ulceration or induration present.      LABS:          PT/INR - ( 25 Jan 2019 06:34 )   PT: 37.9 sec;   INR: 3.18 ratio         PTT - ( 25 Jan 2019 06:34 )  PTT:36.6 sec                    MICROBIOLOGY:    RADIOLOGY & ADDITIONAL STUDIES:< from: Xray Chest 1 View- PORTABLE-Urgent (01.21.19 @ 18:28) >  FINDINGS:   The lungs  show a RIGHT infrahilar airspace consolidation with the   dilated bronchi concerning for bronchiectasis. RIGHT upper lobe clear.   LEFT lung base shows vertically oriented linear opacity of indeterminate   etiology either vascular or focal atelectasis.    The  heart is enlarged in transverse diameter. No hilar mass. Trachea   midline.       . Right atrium and biventricular cardiac wire leads in place.           Visualized osseous structures are intact.        IMPRESSION:   RIGHT lower lobe medial basilar airspace   consolidation/bronchiectasis..            < end of copied text >  Rapid Respiratory Viral Panel (01.21.19 @ 19:47)    Rapid RVP Result: Detected: This Respiratory Panel uses polymerase chain reaction (PCR) to detect for  adenovirus; coronavirus (HKU1, NL63, 229E, OC43); human metapneumovirus  (hMPV); human enterovirus/rhinovirus (Entero/RV); influenza A; influenza  A/H1; influenza A/H3; influenza A/H1-2009; influenza B; parainfluenza  viruses 1, 2, 3, 4; respiratory syncytial virus; Mycoplasma pneumoniae;  and Chlamydophila pneumoniae.

## 2019-01-25 NOTE — PROGRESS NOTE ADULT - ASSESSMENT
85 year old female copd, continues to wheeze and short of breath. On 1/25 she has serious cough with productive sputum. 85 year old female copd, continues to wheeze and short of breath. On 1/25 she has serious cough with productive sputum. Atrial fibrillation hold coumadin tonight as INR is greater than 3.

## 2019-01-26 LAB
ANION GAP SERPL CALC-SCNC: 8 MMOL/L — SIGNIFICANT CHANGE UP (ref 5–17)
APTT BLD: 36 SEC — SIGNIFICANT CHANGE UP (ref 27.5–36.3)
BUN SERPL-MCNC: 33 MG/DL — HIGH (ref 8–20)
CALCIUM SERPL-MCNC: 9.6 MG/DL — SIGNIFICANT CHANGE UP (ref 8.6–10.2)
CHLORIDE SERPL-SCNC: 95 MMOL/L — LOW (ref 98–107)
CO2 SERPL-SCNC: 35 MMOL/L — HIGH (ref 22–29)
CREAT SERPL-MCNC: 0.8 MG/DL — SIGNIFICANT CHANGE UP (ref 0.5–1.3)
CULTURE RESULTS: SIGNIFICANT CHANGE UP
CULTURE RESULTS: SIGNIFICANT CHANGE UP
GLUCOSE SERPL-MCNC: 107 MG/DL — SIGNIFICANT CHANGE UP (ref 70–115)
HCT VFR BLD CALC: 41 % — SIGNIFICANT CHANGE UP (ref 37–47)
HGB BLD-MCNC: 13 G/DL — SIGNIFICANT CHANGE UP (ref 12–16)
INR BLD: 3.29 RATIO — HIGH (ref 0.88–1.16)
MAGNESIUM SERPL-MCNC: 2 MG/DL — SIGNIFICANT CHANGE UP (ref 1.6–2.6)
MCHC RBC-ENTMCNC: 31.7 G/DL — LOW (ref 32–36)
MCHC RBC-ENTMCNC: 32.9 PG — HIGH (ref 27–31)
MCV RBC AUTO: 103.8 FL — HIGH (ref 81–99)
PHOSPHATE SERPL-MCNC: 3.4 MG/DL — SIGNIFICANT CHANGE UP (ref 2.4–4.7)
PLATELET # BLD AUTO: 209 K/UL — SIGNIFICANT CHANGE UP (ref 150–400)
POTASSIUM SERPL-MCNC: 4.3 MMOL/L — SIGNIFICANT CHANGE UP (ref 3.5–5.3)
POTASSIUM SERPL-SCNC: 4.3 MMOL/L — SIGNIFICANT CHANGE UP (ref 3.5–5.3)
PROTHROM AB SERPL-ACNC: 39.2 SEC — HIGH (ref 10–12.9)
RBC # BLD: 3.95 M/UL — LOW (ref 4.4–5.2)
RBC # FLD: 14.6 % — SIGNIFICANT CHANGE UP (ref 11–15.6)
SODIUM SERPL-SCNC: 138 MMOL/L — SIGNIFICANT CHANGE UP (ref 135–145)
SPECIMEN SOURCE: SIGNIFICANT CHANGE UP
SPECIMEN SOURCE: SIGNIFICANT CHANGE UP
WBC # BLD: 5.8 K/UL — SIGNIFICANT CHANGE UP (ref 4.8–10.8)
WBC # FLD AUTO: 5.8 K/UL — SIGNIFICANT CHANGE UP (ref 4.8–10.8)

## 2019-01-26 PROCEDURE — 99232 SBSQ HOSP IP/OBS MODERATE 35: CPT

## 2019-01-26 PROCEDURE — 93306 TTE W/DOPPLER COMPLETE: CPT | Mod: 26

## 2019-01-26 RX ORDER — LISINOPRIL 2.5 MG/1
2.5 TABLET ORAL DAILY
Qty: 0 | Refills: 0 | Status: DISCONTINUED | OUTPATIENT
Start: 2019-01-26 | End: 2019-01-28

## 2019-01-26 RX ADMIN — Medication 100 MILLIGRAM(S): at 22:33

## 2019-01-26 RX ADMIN — Medication 3 MILLILITER(S): at 09:07

## 2019-01-26 RX ADMIN — Medication 20 MILLIGRAM(S): at 11:58

## 2019-01-26 RX ADMIN — Medication 100 MILLIGRAM(S): at 05:47

## 2019-01-26 RX ADMIN — Medication 40 MILLIGRAM(S): at 05:47

## 2019-01-26 RX ADMIN — Medication 3 MILLILITER(S): at 21:34

## 2019-01-26 RX ADMIN — ATORVASTATIN CALCIUM 20 MILLIGRAM(S): 80 TABLET, FILM COATED ORAL at 22:32

## 2019-01-26 RX ADMIN — ATENOLOL 25 MILLIGRAM(S): 25 TABLET ORAL at 05:47

## 2019-01-26 RX ADMIN — Medication 3 MILLILITER(S): at 15:29

## 2019-01-26 RX ADMIN — AMLODIPINE BESYLATE 5 MILLIGRAM(S): 2.5 TABLET ORAL at 05:47

## 2019-01-26 RX ADMIN — PANTOPRAZOLE SODIUM 40 MILLIGRAM(S): 20 TABLET, DELAYED RELEASE ORAL at 05:48

## 2019-01-26 RX ADMIN — Medication 3 MILLILITER(S): at 03:50

## 2019-01-26 RX ADMIN — Medication 5 MILLIGRAM(S): at 11:58

## 2019-01-26 RX ADMIN — FAMOTIDINE 40 MILLIGRAM(S): 10 INJECTION INTRAVENOUS at 22:33

## 2019-01-26 RX ADMIN — Medication 100 MILLIGRAM(S): at 11:59

## 2019-01-26 RX ADMIN — Medication 325 MILLIGRAM(S): at 11:58

## 2019-01-26 NOTE — PROGRESS NOTE ADULT - SUBJECTIVE AND OBJECTIVE BOX
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  CHF (congestive heart failure)  GERD (gastroesophageal reflux disease)  COPD (chronic obstructive pulmonary disease)  HTN (hypertension)  Bleeding internal hemorrhoids  Pacemaker  Atrial fibrillation  Fall  TIA (transient ischemic attack): 2000  Status post Mohs surgery for basal cell carcinoma: left knee  Cardiac pacemaker  Presence of inferior vena cava filter  H/O atrioventricular eva ablation  Ptosis, both eyelids  Internal hemorrhoid: banded and cauterized  History of total knee arthroplasty, right  History of lumbar laminectomy: L3-4  History of cholecystectomy  H/O repair of right rotator cuff  Rectal adenoma: s/p excision 2005  S/P IVC filter: placed 1/9  Acute carpal tunnel syndrome: with surgery  H/O cardiac catheterization: 2005 &amp; 2007- no stents  Acquired ptosis of eyelid: 2014  S/P cataract surgery, left: June 2010  S/P cataract surgery, right: May 2010  S/P knee replacement, right: 2007  H/O laminectomy: 3/4 lumbar    2002  Complete tear of rotator cuff, right: 1998  S/P cholecystectomy: 1991  H/O total hysterectomy: 1971      HPI/OVERNIGHT EVENTS: Patient is less short of breath than yesterday.    MEDICATIONS  (STANDING):  ALBUTerol/ipratropium for Nebulization 3 milliLiter(s) Nebulizer every 6 hours  amLODIPine   Tablet 5 milliGRAM(s) Oral daily  ATENolol  Tablet 25 milliGRAM(s) Oral daily  atorvastatin 20 milliGRAM(s) Oral at bedtime  benzonatate 100 milliGRAM(s) Oral three times a day  famotidine    Tablet 40 milliGRAM(s) Oral at bedtime  ferrous    sulfate 325 milliGRAM(s) Oral daily  FLUoxetine 20 milliGRAM(s) Oral daily  furosemide    Tablet 40 milliGRAM(s) Oral daily  lisinopril 2.5 milliGRAM(s) Oral daily  oxybutynin 5 milliGRAM(s) Oral daily  pantoprazole    Tablet 40 milliGRAM(s) Oral before breakfast  predniSONE   Tablet 40 milliGRAM(s) Oral daily      Vital Signs Last 24 Hrs  T(C): 36.6 (26 Jan 2019 12:09), Max: 36.6 (25 Jan 2019 17:10)  T(F): 97.9 (26 Jan 2019 12:09), Max: 97.9 (26 Jan 2019 12:09)  HR: 60 (26 Jan 2019 15:31) (60 - 65)  BP: 140/73 (26 Jan 2019 12:09) (117/64 - 146/76)  BP(mean): --  RR: 18 (26 Jan 2019 12:09) (18 - 18)  SpO2: 98% (26 Jan 2019 15:31) (94% - 98%)    PHYSICAL EXAM:  HEENT: PERRLA, EOMI, Normal Hearing  Neck: No LAD, No JVD  Back: No CVA tenderness  Respiratory: CTAB Cardiovascular: S1 and S2, RRR, no M/G/R  Gastrointestinal: BS+, soft, NT/ND  Extremities: No peripheral edema  Vascular: 2+ peripheral pulses  Neurological: A/O x 3, no focal deficits        CAPILLARY BLOOD GLUCOSE    LABS:                        13.0   5.8   )-----------( 209      ( 26 Jan 2019 09:25 )             41.0     01-26    138  |  95<L>  |  33.0<H>  ----------------------------<  107  4.3   |  35.0<H>  |  0.80    Ca    9.6      26 Jan 2019 09:25  Phos  3.4     01-26  Mg     2.0     01-26      PT/INR - ( 26 Jan 2019 09:25 )   PT: 39.2 sec;   INR: 3.29 ratio         PTT - ( 26 Jan 2019 09:25 )  PTT:36.0 sec      RADIOLOGY & ADDITIONAL TESTS:

## 2019-01-26 NOTE — PROGRESS NOTE ADULT - ASSESSMENT
85 year old female copd, continues to wheeze and short of breath. On 1/25 she has serious cough with productive sputum. Atrial fibrillation hold coumadin tonight as INR is greater than 3. Prepare for discharge.

## 2019-01-27 ENCOUNTER — TRANSCRIPTION ENCOUNTER (OUTPATIENT)
Age: 84
End: 2019-01-27

## 2019-01-27 LAB
INR BLD: 2.84 RATIO — HIGH (ref 0.88–1.16)
PROTHROM AB SERPL-ACNC: 33.7 SEC — HIGH (ref 10–12.9)

## 2019-01-27 PROCEDURE — 99232 SBSQ HOSP IP/OBS MODERATE 35: CPT

## 2019-01-27 RX ORDER — RANITIDINE HYDROCHLORIDE 150 MG/1
150 TABLET, FILM COATED ORAL
Qty: 0 | Refills: 0 | COMMUNITY

## 2019-01-27 RX ORDER — IPRATROPIUM/ALBUTEROL SULFATE 18-103MCG
3 AEROSOL WITH ADAPTER (GRAM) INHALATION
Qty: 0 | Refills: 0 | DISCHARGE
Start: 2019-01-27

## 2019-01-27 RX ORDER — ATORVASTATIN CALCIUM 80 MG/1
1 TABLET, FILM COATED ORAL
Qty: 0 | Refills: 0 | DISCHARGE
Start: 2019-01-27

## 2019-01-27 RX ORDER — FAMOTIDINE 10 MG/ML
1 INJECTION INTRAVENOUS
Qty: 0 | Refills: 0 | DISCHARGE
Start: 2019-01-27

## 2019-01-27 RX ORDER — SUCRALFATE 1 G
10 TABLET ORAL
Qty: 0 | Refills: 0 | COMMUNITY

## 2019-01-27 RX ORDER — FUROSEMIDE 40 MG
1 TABLET ORAL
Qty: 0 | Refills: 0 | DISCHARGE
Start: 2019-01-27

## 2019-01-27 RX ORDER — LISINOPRIL 2.5 MG/1
1 TABLET ORAL
Qty: 0 | Refills: 0 | COMMUNITY
Start: 2019-01-27

## 2019-01-27 RX ORDER — FLUOXETINE HCL 10 MG
1 CAPSULE ORAL
Qty: 0 | Refills: 0 | DISCHARGE
Start: 2019-01-27

## 2019-01-27 RX ORDER — FERROUS SULFATE 325(65) MG
1 TABLET ORAL
Qty: 0 | Refills: 0 | DISCHARGE
Start: 2019-01-27

## 2019-01-27 RX ORDER — OXYBUTYNIN CHLORIDE 5 MG
1 TABLET ORAL
Qty: 0 | Refills: 0 | DISCHARGE
Start: 2019-01-27

## 2019-01-27 RX ORDER — AMLODIPINE BESYLATE 2.5 MG/1
1 TABLET ORAL
Qty: 0 | Refills: 0 | DISCHARGE
Start: 2019-01-27

## 2019-01-27 RX ORDER — ATENOLOL 25 MG/1
1 TABLET ORAL
Qty: 0 | Refills: 0 | DISCHARGE
Start: 2019-01-27

## 2019-01-27 RX ORDER — GUAIFENESIN/DEXTROMETHORPHAN 600MG-30MG
5 TABLET, EXTENDED RELEASE 12 HR ORAL
Qty: 0 | Refills: 0 | DISCHARGE
Start: 2019-01-27

## 2019-01-27 RX ORDER — WARFARIN SODIUM 2.5 MG/1
3 TABLET ORAL ONCE
Qty: 0 | Refills: 0 | Status: COMPLETED | OUTPATIENT
Start: 2019-01-27 | End: 2019-01-27

## 2019-01-27 RX ADMIN — Medication 100 MILLIGRAM(S): at 05:18

## 2019-01-27 RX ADMIN — PANTOPRAZOLE SODIUM 40 MILLIGRAM(S): 20 TABLET, DELAYED RELEASE ORAL at 05:18

## 2019-01-27 RX ADMIN — Medication 100 MILLIGRAM(S): at 22:28

## 2019-01-27 RX ADMIN — ATENOLOL 25 MILLIGRAM(S): 25 TABLET ORAL at 05:18

## 2019-01-27 RX ADMIN — AMLODIPINE BESYLATE 5 MILLIGRAM(S): 2.5 TABLET ORAL at 05:17

## 2019-01-27 RX ADMIN — FAMOTIDINE 40 MILLIGRAM(S): 10 INJECTION INTRAVENOUS at 22:27

## 2019-01-27 RX ADMIN — Medication 20 MILLIGRAM(S): at 12:08

## 2019-01-27 RX ADMIN — Medication 3 MILLILITER(S): at 20:40

## 2019-01-27 RX ADMIN — Medication 5 MILLIGRAM(S): at 15:03

## 2019-01-27 RX ADMIN — Medication 3 MILLILITER(S): at 10:28

## 2019-01-27 RX ADMIN — LISINOPRIL 2.5 MILLIGRAM(S): 2.5 TABLET ORAL at 05:18

## 2019-01-27 RX ADMIN — Medication 40 MILLIGRAM(S): at 05:17

## 2019-01-27 RX ADMIN — Medication 100 MILLIGRAM(S): at 12:08

## 2019-01-27 RX ADMIN — Medication 3 MILLILITER(S): at 16:03

## 2019-01-27 RX ADMIN — Medication 40 MILLIGRAM(S): at 05:18

## 2019-01-27 RX ADMIN — ATORVASTATIN CALCIUM 20 MILLIGRAM(S): 80 TABLET, FILM COATED ORAL at 22:28

## 2019-01-27 RX ADMIN — WARFARIN SODIUM 3 MILLIGRAM(S): 2.5 TABLET ORAL at 22:28

## 2019-01-27 RX ADMIN — Medication 325 MILLIGRAM(S): at 12:08

## 2019-01-27 RX ADMIN — Medication 3 MILLILITER(S): at 03:48

## 2019-01-27 NOTE — PROGRESS NOTE ADULT - SUBJECTIVE AND OBJECTIVE BOX
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  CHF (congestive heart failure)  GERD (gastroesophageal reflux disease)  COPD (chronic obstructive pulmonary disease)  HTN (hypertension)  Bleeding internal hemorrhoids  Pacemaker  Atrial fibrillation  Fall  TIA (transient ischemic attack): 2000  Status post Mohs surgery for basal cell carcinoma: left knee  Cardiac pacemaker  Presence of inferior vena cava filter  H/O atrioventricular eva ablation  Ptosis, both eyelids  Internal hemorrhoid: banded and cauterized  History of total knee arthroplasty, right  History of lumbar laminectomy: L3-4  History of cholecystectomy  H/O repair of right rotator cuff  Rectal adenoma: s/p excision 2005  S/P IVC filter: placed 1/9  Acute carpal tunnel syndrome: with surgery  H/O cardiac catheterization: 2005 &amp; 2007- no stents  Acquired ptosis of eyelid: 2014  S/P cataract surgery, left: June 2010  S/P cataract surgery, right: May 2010  S/P knee replacement, right: 2007  H/O laminectomy: 3/4 lumbar    2002  Complete tear of rotator cuff, right: 1998  S/P cholecystectomy: 1991  H/O total hysterectomy: 1971      HPI/OVERNIGHT EVENTS: Patient is still short of breath.    MEDICATIONS  (STANDING):  ALBUTerol/ipratropium for Nebulization 3 milliLiter(s) Nebulizer every 6 hours  amLODIPine   Tablet 5 milliGRAM(s) Oral daily  ATENolol  Tablet 25 milliGRAM(s) Oral daily  atorvastatin 20 milliGRAM(s) Oral at bedtime  benzonatate 100 milliGRAM(s) Oral three times a day  famotidine    Tablet 40 milliGRAM(s) Oral at bedtime  ferrous    sulfate 325 milliGRAM(s) Oral daily  FLUoxetine 20 milliGRAM(s) Oral daily  furosemide    Tablet 40 milliGRAM(s) Oral daily  lisinopril 2.5 milliGRAM(s) Oral daily  oxybutynin 5 milliGRAM(s) Oral daily  pantoprazole    Tablet 40 milliGRAM(s) Oral before breakfast  predniSONE   Tablet 40 milliGRAM(s) Oral daily  warfarin 3 milliGRAM(s) Oral once      Vital Signs Last 24 Hrs  T(C): 36.7 (27 Jan 2019 15:45), Max: 36.8 (26 Jan 2019 22:29)  T(F): 98.1 (27 Jan 2019 15:45), Max: 98.3 (26 Jan 2019 22:29)  HR: 64 (27 Jan 2019 15:45) (62 - 66)  BP: 104/58 (27 Jan 2019 15:45) (104/58 - 139/73)  BP(mean): --  RR: 18 (27 Jan 2019 15:45) (18 - 18)  SpO2: 98% (27 Jan 2019 16:29) (92% - 99%)    PHYSICAL EXAM:  HEENT: PERRLA, EOMI, Normal Hearing  Neck: No LAD, No JVD  Back: No CVA tenderness  Respiratory: CTAB Cardiovascular: S1 and S2, RRR, no M/G/R  Gastrointestinal: BS+, soft, NT/ND  Extremities: No peripheral edema  Vascular: 2+ peripheral pulses  Neurological: A/O x 3, no focal deficits        CAPILLARY BLOOD GLUCOSE    LABS:                        13.0   5.8   )-----------( 209      ( 26 Jan 2019 09:25 )             41.0     01-26    138  |  95<L>  |  33.0<H>  ----------------------------<  107  4.3   |  35.0<H>  |  0.80    Ca    9.6      26 Jan 2019 09:25  Phos  3.4     01-26  Mg     2.0     01-26      PT/INR - ( 27 Jan 2019 07:33 )   PT: 33.7 sec;   INR: 2.84 ratio         PTT - ( 26 Jan 2019 09:25 )  PTT:36.0 sec      RADIOLOGY & ADDITIONAL TESTS:

## 2019-01-27 NOTE — DISCHARGE NOTE ADULT - CARE PROVIDER_API CALL
Satinder Tracy), Internal Medicine; Pulmonary Disease  [place] [address]  Phone: [phone]  Fax: [phone] Satinder Tracy), Internal Medicine; Pulmonary Disease  Pulmonary Medicine at [address]  [place] [address]  Phone: [phone]  Fax: [phone]    Israel Martinez), Family Medicine  [address] [place] [address]  Phone: [phone]  Fax: [phone]

## 2019-01-27 NOTE — DISCHARGE NOTE ADULT - MEDICATION SUMMARY - MEDICATIONS TO TAKE
I will START or STAY ON the medications listed below when I get home from the hospital:    budesonide 0.5 mg/2 mL inhalation suspension  -- 2 milliliter(s) inhaled 2 times a day, As Needed  -- Indication: For COPD exacerbation    predniSONE 5 mg oral tablet  -- 6 tabs a day for 3 days  5 tabs a day for 3 days  4 tabs a day for 3 days  3 tabs a day for 3 days  2 tabs a day for 3 days  1 tab a day for 3 days    -- Indication: For COPD exacerbation    lisinopril 2.5 mg oral tablet  -- 1 tab(s) by mouth once a day  -- Indication: For HTN    warfarin 4 mg oral tablet  -- 1 tab(s) by mouth once a day  -- Indication: For A fib    gabapentin 300 mg oral capsule  -- 2 cap(s) by mouth 2 times a day  -- Indication: For neuropathy    FLUoxetine 20 mg oral capsule  -- 1 cap(s) by mouth once a day  -- Indication: For depression    atorvastatin 20 mg oral tablet  -- 1 tab(s) by mouth once a day (at bedtime)  -- Indication: For hyperlipidemia    atenolol 25 mg oral tablet  -- 1 tab(s) by mouth once a day  -- Indication: For htn    Spiriva 18 mcg inhalation capsule  -- 1 cap(s) inhaled once a day   -- Check with your doctor before becoming pregnant.  For inhalation only.  It is very important that you take or use this exactly as directed.  Do not skip doses or discontinue unless directed by your doctor.  Obtain medical advice before taking any non-prescription drugs as some may affect the action of this medication.    -- Indication: For COPD exacerbation    ipratropium-albuterol 0.5 mg-2.5 mg/3 mLinhalation solution  -- 3 milliliter(s) inhaled every 6 hours  -- Indication: For COPD exacerbation    amLODIPine 5 mg oral tablet  -- 1 tab(s) by mouth once a day  -- Indication: For htn    furosemide 40 mg oral tablet  -- 1 tab(s) by mouth once a day  -- Indication: For Chf    famotidine 40 mg oral tablet  -- 1 tab(s) by mouth once a day (at bedtime)  -- Indication: For Gerd    ferrous sulfate 325 mg (65 mg elemental iron) oral tablet  -- 1 tab(s) by mouth once a day  -- Indication: For Anemia, unspecified type    fluticasone 50 mcg/inh nasal spray  -- 1 spray(s) into nose once a day  -- Indication: For ASthma    omeprazole 20 mg oral delayed release capsule  -- 1 cap(s) by mouth once a day  -- Indication: For GERD    guaifenesin-dextromethorphan 100 mg-10 mg/5 mL oral liquid  -- 5 milliliter(s) by mouth every 6 hours, As needed, Cough  -- Indication: For COugh    oxybutynin 5 mg oral tablet  -- 1 tab(s) by mouth once a day  -- Indication: For Polyuria

## 2019-01-27 NOTE — DISCHARGE NOTE ADULT - CARE PLAN
Principal Discharge DX:	Centrilobular emphysema  Goal:	Stable respiration  Secondary Diagnosis:	Chronic atrial fibrillation  Secondary Diagnosis:	Chronic diastolic congestive heart failure Principal Discharge DX:	Centrilobular emphysema  Goal:	Stable respiration  Assessment and plan of treatment:	Continue inhalers and prednisone taper  Secondary Diagnosis:	Chronic atrial fibrillation  Goal:	Continue coumadin  Assessment and plan of treatment:	Monitor INR with Dr Martinez  Secondary Diagnosis:	Chronic diastolic congestive heart failure  Secondary Diagnosis:	Pneumonia of right lower lobe due to infectious organism  Goal:	Resolved

## 2019-01-27 NOTE — DISCHARGE NOTE ADULT - MEDICATION SUMMARY - MEDICATIONS TO STOP TAKING
I will STOP taking the medications listed below when I get home from the hospital:    furosemide  -- 20 milligram(s) by mouth once a day

## 2019-01-27 NOTE — PROGRESS NOTE ADULT - ASSESSMENT
85 year old female copd, continues to wheeze and short of breath. On 1/25 she has serious cough with productive sputum. Atrial fibrillation resume coumadin as INR is less than 3. Prepare for discharge.  Patient suffers from moderate to advanced copd. Her exacerbation is resolving and she may return home on 1/28.

## 2019-01-27 NOTE — DISCHARGE NOTE ADULT - PLAN OF CARE
Stable respiration Continue inhalers and prednisone taper Continue coumadin Monitor INR with Dr Martinez Resolved

## 2019-01-27 NOTE — DISCHARGE NOTE ADULT - HOSPITAL COURSE
85 year old female copd, continues to wheeze and short of breath. On 1/25 she has serious cough with productive sputum. Atrial fibrillation resume coumadin as INR is less than 3.    Patient suffers from moderate to advanced copd. Her exacerbation is resolving and she may return home. I spoke to her at length regarding her lung disease, and risk of osteoporosis. I advised her to follow up with Dr Martinez for INR monitoring and fall precaution.     Problem/Plan - 1:  ·  Problem: Pneumonia of right lower lobe due to infectious organism.  Plan: Patient completed cycle of antibiotics. Follow up with Dr Martinez.      Problem/Plan - 2:  ·  Problem: COPD exacerbation.  Plan: Taper steroids as tolerated. Prednisone taper prescribed.      Problem/Plan - 3:  ·  Problem: Anemia, unspecified type.  Plan: Monitor hgb.      Problem/Plan - 4:  ·  Problem: Chronic diastolic congestive heart failure.  Plan: Stable add lisinopril 2.5.      Problem/Plan - 5:  ·  Problem: Atrial fibrillation.  Plan: Resume coumadin    Target INR 2-3  Monitor INR closely.     Attending Attestation:   I was physically present for the key portions of the evaluation and management (E/M) service provided.  I agree with the above history, physical, and plan which I have reviewed and edited where appropriate.     48 minutes spent on total encounter; more than 50% of the visit was spent counseling and/or coordinating care by the attending physician.

## 2019-01-27 NOTE — DISCHARGE NOTE ADULT - PATIENT PORTAL LINK FT
You can access the [facility], offered by [facility], by registering with the following website: [email or web address]

## 2019-01-27 NOTE — DISCHARGE NOTE ADULT - CARE PROVIDERS DIRECT ADDRESSES
,[email or web address],DirectAddress_Unknown

## 2019-01-27 NOTE — DISCHARGE NOTE ADULT - SECONDARY DIAGNOSIS.
Chronic atrial fibrillation Chronic diastolic congestive heart failure Pneumonia of right lower lobe due to infectious organism

## 2019-01-28 ENCOUNTER — INBOUND DOCUMENT (OUTPATIENT)
Age: 84
End: 2019-01-28

## 2019-01-28 VITALS
DIASTOLIC BLOOD PRESSURE: 58 MMHG | TEMPERATURE: 98 F | HEART RATE: 63 BPM | SYSTOLIC BLOOD PRESSURE: 104 MMHG | RESPIRATION RATE: 18 BRPM

## 2019-01-28 LAB
ANION GAP SERPL CALC-SCNC: 7 MMOL/L — SIGNIFICANT CHANGE UP (ref 5–17)
BUN SERPL-MCNC: 31 MG/DL — HIGH (ref 8–20)
CALCIUM SERPL-MCNC: 9.6 MG/DL — SIGNIFICANT CHANGE UP (ref 8.6–10.2)
CHLORIDE SERPL-SCNC: 97 MMOL/L — LOW (ref 98–107)
CO2 SERPL-SCNC: 39 MMOL/L — HIGH (ref 22–29)
CREAT SERPL-MCNC: 0.92 MG/DL — SIGNIFICANT CHANGE UP (ref 0.5–1.3)
GLUCOSE SERPL-MCNC: 96 MG/DL — SIGNIFICANT CHANGE UP (ref 70–115)
HCT VFR BLD CALC: 41 % — SIGNIFICANT CHANGE UP (ref 37–47)
HGB BLD-MCNC: 12.9 G/DL — SIGNIFICANT CHANGE UP (ref 12–16)
INR BLD: 2.11 RATIO — HIGH (ref 0.88–1.16)
MAGNESIUM SERPL-MCNC: 1.9 MG/DL — SIGNIFICANT CHANGE UP (ref 1.6–2.6)
MCHC RBC-ENTMCNC: 31.5 G/DL — LOW (ref 32–36)
MCHC RBC-ENTMCNC: 32.5 PG — HIGH (ref 27–31)
MCV RBC AUTO: 103.3 FL — HIGH (ref 81–99)
PHOSPHATE SERPL-MCNC: 3.2 MG/DL — SIGNIFICANT CHANGE UP (ref 2.4–4.7)
PLATELET # BLD AUTO: 177 K/UL — SIGNIFICANT CHANGE UP (ref 150–400)
POTASSIUM SERPL-MCNC: 4.7 MMOL/L — SIGNIFICANT CHANGE UP (ref 3.5–5.3)
POTASSIUM SERPL-SCNC: 4.7 MMOL/L — SIGNIFICANT CHANGE UP (ref 3.5–5.3)
PROTHROM AB SERPL-ACNC: 24.8 SEC — HIGH (ref 10–12.9)
RBC # BLD: 3.97 M/UL — LOW (ref 4.4–5.2)
RBC # FLD: 14.5 % — SIGNIFICANT CHANGE UP (ref 11–15.6)
SODIUM SERPL-SCNC: 143 MMOL/L — SIGNIFICANT CHANGE UP (ref 135–145)
WBC # BLD: 9 K/UL — SIGNIFICANT CHANGE UP (ref 4.8–10.8)
WBC # FLD AUTO: 9 K/UL — SIGNIFICANT CHANGE UP (ref 4.8–10.8)

## 2019-01-28 PROCEDURE — 97116 GAIT TRAINING THERAPY: CPT

## 2019-01-28 PROCEDURE — 96365 THER/PROPH/DIAG IV INF INIT: CPT

## 2019-01-28 PROCEDURE — 71045 X-RAY EXAM CHEST 1 VIEW: CPT

## 2019-01-28 PROCEDURE — 87040 BLOOD CULTURE FOR BACTERIA: CPT

## 2019-01-28 PROCEDURE — 84100 ASSAY OF PHOSPHORUS: CPT

## 2019-01-28 PROCEDURE — 83880 ASSAY OF NATRIURETIC PEPTIDE: CPT

## 2019-01-28 PROCEDURE — 80048 BASIC METABOLIC PNL TOTAL CA: CPT

## 2019-01-28 PROCEDURE — 82550 ASSAY OF CK (CPK): CPT

## 2019-01-28 PROCEDURE — 87633 RESP VIRUS 12-25 TARGETS: CPT

## 2019-01-28 PROCEDURE — 99239 HOSP IP/OBS DSCHRG MGMT >30: CPT

## 2019-01-28 PROCEDURE — 83735 ASSAY OF MAGNESIUM: CPT

## 2019-01-28 PROCEDURE — 36600 WITHDRAWAL OF ARTERIAL BLOOD: CPT

## 2019-01-28 PROCEDURE — 87798 DETECT AGENT NOS DNA AMP: CPT

## 2019-01-28 PROCEDURE — 87486 CHLMYD PNEUM DNA AMP PROBE: CPT

## 2019-01-28 PROCEDURE — 94640 AIRWAY INHALATION TREATMENT: CPT

## 2019-01-28 PROCEDURE — 97163 PT EVAL HIGH COMPLEX 45 MIN: CPT

## 2019-01-28 PROCEDURE — 85730 THROMBOPLASTIN TIME PARTIAL: CPT

## 2019-01-28 PROCEDURE — 96375 TX/PRO/DX INJ NEW DRUG ADDON: CPT

## 2019-01-28 PROCEDURE — 93306 TTE W/DOPPLER COMPLETE: CPT

## 2019-01-28 PROCEDURE — 82803 BLOOD GASES ANY COMBINATION: CPT

## 2019-01-28 PROCEDURE — 96366 THER/PROPH/DIAG IV INF ADDON: CPT

## 2019-01-28 PROCEDURE — 85027 COMPLETE CBC AUTOMATED: CPT

## 2019-01-28 PROCEDURE — 84484 ASSAY OF TROPONIN QUANT: CPT

## 2019-01-28 PROCEDURE — 99285 EMERGENCY DEPT VISIT HI MDM: CPT | Mod: 25

## 2019-01-28 PROCEDURE — 36415 COLL VENOUS BLD VENIPUNCTURE: CPT

## 2019-01-28 PROCEDURE — 85610 PROTHROMBIN TIME: CPT

## 2019-01-28 PROCEDURE — 80053 COMPREHEN METABOLIC PANEL: CPT

## 2019-01-28 PROCEDURE — 94760 N-INVAS EAR/PLS OXIMETRY 1: CPT

## 2019-01-28 PROCEDURE — 97110 THERAPEUTIC EXERCISES: CPT

## 2019-01-28 PROCEDURE — 93005 ELECTROCARDIOGRAM TRACING: CPT

## 2019-01-28 PROCEDURE — 83605 ASSAY OF LACTIC ACID: CPT

## 2019-01-28 PROCEDURE — 87581 M.PNEUMON DNA AMP PROBE: CPT

## 2019-01-28 RX ORDER — FLUOXETINE HCL 10 MG
1 CAPSULE ORAL
Qty: 0 | Refills: 0 | COMMUNITY

## 2019-01-28 RX ORDER — LISINOPRIL 2.5 MG/1
1 TABLET ORAL
Qty: 30 | Refills: 0
Start: 2019-01-28 | End: 2019-02-26

## 2019-01-28 RX ORDER — TIOTROPIUM BROMIDE 18 UG/1
1 CAPSULE ORAL; RESPIRATORY (INHALATION)
Qty: 30 | Refills: 0
Start: 2019-01-28 | End: 2019-02-26

## 2019-01-28 RX ORDER — ATENOLOL 25 MG/1
1 TABLET ORAL
Qty: 0 | Refills: 0 | COMMUNITY

## 2019-01-28 RX ORDER — FUROSEMIDE 40 MG
20 TABLET ORAL
Qty: 0 | Refills: 0 | COMMUNITY

## 2019-01-28 RX ORDER — OXYBUTYNIN CHLORIDE 5 MG
1 TABLET ORAL
Qty: 0 | Refills: 0 | COMMUNITY

## 2019-01-28 RX ORDER — ATORVASTATIN CALCIUM 80 MG/1
1 TABLET, FILM COATED ORAL
Qty: 0 | Refills: 0 | COMMUNITY

## 2019-01-28 RX ADMIN — Medication 3 MILLILITER(S): at 03:04

## 2019-01-28 RX ADMIN — ATENOLOL 25 MILLIGRAM(S): 25 TABLET ORAL at 05:24

## 2019-01-28 RX ADMIN — PANTOPRAZOLE SODIUM 40 MILLIGRAM(S): 20 TABLET, DELAYED RELEASE ORAL at 05:24

## 2019-01-28 RX ADMIN — AMLODIPINE BESYLATE 5 MILLIGRAM(S): 2.5 TABLET ORAL at 05:25

## 2019-01-28 RX ADMIN — Medication 40 MILLIGRAM(S): at 05:25

## 2019-01-28 RX ADMIN — Medication 3 MILLILITER(S): at 10:21

## 2019-01-28 RX ADMIN — LISINOPRIL 2.5 MILLIGRAM(S): 2.5 TABLET ORAL at 05:24

## 2019-01-28 RX ADMIN — Medication 100 MILLIGRAM(S): at 05:25

## 2019-01-28 RX ADMIN — Medication 40 MILLIGRAM(S): at 05:24

## 2019-01-28 NOTE — PROGRESS NOTE ADULT - PROBLEM SELECTOR PLAN 5
Hold coumadin on 1/25 secondary to elevated INR and repeat INR is am.  Monitor INR closely
Resume coumadin    Target INR 2-3  Monitor INR closely
Resume coumadin on 1/27 secondary to  INR less than 3 and repeat INR is am.  Monitor INR closely
Hold coumadin on 1/25 secondary to elevated INR and repeat INR is am.

## 2019-01-28 NOTE — PROGRESS NOTE ADULT - SUBJECTIVE AND OBJECTIVE BOX
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  CHF (congestive heart failure)  GERD (gastroesophageal reflux disease)  COPD (chronic obstructive pulmonary disease)  HTN (hypertension)  Bleeding internal hemorrhoids  Pacemaker  Atrial fibrillation  Fall  TIA (transient ischemic attack): 2000  Status post Mohs surgery for basal cell carcinoma: left knee  Cardiac pacemaker  Presence of inferior vena cava filter  H/O atrioventricular eva ablation  Ptosis, both eyelids  Internal hemorrhoid: banded and cauterized  History of total knee arthroplasty, right  History of lumbar laminectomy: L3-4  History of cholecystectomy  H/O repair of right rotator cuff  Rectal adenoma: s/p excision 2005  S/P IVC filter: placed 1/9  Acute carpal tunnel syndrome: with surgery  H/O cardiac catheterization: 2005 &amp; 2007- no stents  Acquired ptosis of eyelid: 2014  S/P cataract surgery, left: June 2010  S/P cataract surgery, right: May 2010  S/P knee replacement, right: 2007  H/O laminectomy: 3/4 lumbar    2002  Complete tear of rotator cuff, right: 1998  S/P cholecystectomy: 1991  H/O total hysterectomy: 1971      HPI/OVERNIGHT EVENTS: Patient in no distress    MEDICATIONS  (STANDING):  ALBUTerol/ipratropium for Nebulization 3 milliLiter(s) Nebulizer every 6 hours  amLODIPine   Tablet 5 milliGRAM(s) Oral daily  ATENolol  Tablet 25 milliGRAM(s) Oral daily  atorvastatin 20 milliGRAM(s) Oral at bedtime  benzonatate 100 milliGRAM(s) Oral three times a day  famotidine    Tablet 40 milliGRAM(s) Oral at bedtime  ferrous    sulfate 325 milliGRAM(s) Oral daily  FLUoxetine 20 milliGRAM(s) Oral daily  furosemide    Tablet 40 milliGRAM(s) Oral daily  lisinopril 2.5 milliGRAM(s) Oral daily  oxybutynin 5 milliGRAM(s) Oral daily  pantoprazole    Tablet 40 milliGRAM(s) Oral before breakfast  predniSONE   Tablet 40 milliGRAM(s) Oral daily      Vital Signs Last 24 Hrs  T(C): 36.8 (28 Jan 2019 05:21), Max: 36.8 (27 Jan 2019 16:29)  T(F): 98.3 (28 Jan 2019 05:21), Max: 98.3 (28 Jan 2019 05:21)  HR: 73 (28 Jan 2019 10:24) (61 - 92)  BP: 111/74 (28 Jan 2019 05:21) (104/58 - 131/75)  BP(mean): --  RR: 19 (28 Jan 2019 05:21) (18 - 19)  SpO2: 97% (28 Jan 2019 10:24) (97% - 98%)    PHYSICAL EXAM:  HEENT: PERRLA, EOMI, Normal Hearing  Neck: No LAD, No JVD  Back: No CVA tenderness  Respiratory: CTAB Cardiovascular: S1 and S2, RRR, no M/G/R  Gastrointestinal: BS+, soft, NT/ND  Extremities: No peripheral edema  Vascular: 2+ peripheral pulses  Neurological: A/O x 3, no focal deficits        CAPILLARY BLOOD GLUCOSE    LABS:                        12.9   9.0   )-----------( 177      ( 28 Jan 2019 06:29 )             41.0     01-28    143  |  97<L>  |  31.0<H>  ----------------------------<  96  4.7   |  39.0<H>  |  0.92    Ca    9.6      28 Jan 2019 06:29  Phos  3.2     01-28  Mg     1.9     01-28      PT/INR - ( 28 Jan 2019 06:29 )   PT: 24.8 sec;   INR: 2.11 ratio               RADIOLOGY & ADDITIONAL TESTS:

## 2019-01-28 NOTE — PROGRESS NOTE ADULT - REASON FOR ADMISSION
COPD exacerbation

## 2019-01-28 NOTE — PROGRESS NOTE ADULT - ASSESSMENT
85 year old female copd, continues to wheeze and short of breath. On 1/25 she has serious cough with productive sputum. Atrial fibrillation resume coumadin as INR is less than 3.    Patient suffers from moderate to advanced copd. Her exacerbation is resolving and she may return home. I spoke to her at length regarding her lung disease, and risk of osteoporosis. I advised her to follow up with Dr Martinez for INR monitoring and fall precaution.

## 2019-01-30 ENCOUNTER — APPOINTMENT (OUTPATIENT)
Dept: ORTHOPEDIC SURGERY | Facility: CLINIC | Age: 84
End: 2019-01-30

## 2019-02-04 ENCOUNTER — RX RENEWAL (OUTPATIENT)
Age: 84
End: 2019-02-04

## 2019-02-05 ENCOUNTER — OTHER (OUTPATIENT)
Age: 84
End: 2019-02-05

## 2019-02-06 ENCOUNTER — APPOINTMENT (OUTPATIENT)
Dept: PULMONOLOGY | Facility: CLINIC | Age: 84
End: 2019-02-06
Payer: MEDICARE

## 2019-02-06 VITALS
SYSTOLIC BLOOD PRESSURE: 120 MMHG | DIASTOLIC BLOOD PRESSURE: 72 MMHG | OXYGEN SATURATION: 97 % | HEART RATE: 59 BPM | WEIGHT: 225 LBS | BODY MASS INDEX: 34.21 KG/M2

## 2019-02-06 DIAGNOSIS — B97.4 RESPIRATORY SYNCYTIAL VIRUS AS THE CAUSE OF DISEASES CLASSIFIED ELSEWHERE: ICD-10-CM

## 2019-02-06 PROCEDURE — 99495 TRANSJ CARE MGMT MOD F2F 14D: CPT

## 2019-02-06 RX ORDER — BUDESONIDE 0.25 MG/2ML
0.25 INHALANT ORAL 3 TIMES DAILY
Qty: 3 | Refills: 3 | Status: DISCONTINUED | COMMUNITY
Start: 2017-06-02 | End: 2019-02-06

## 2019-02-06 RX ORDER — CEFUROXIME AXETIL 500 MG/1
500 TABLET ORAL
Qty: 10 | Refills: 1 | Status: DISCONTINUED | COMMUNITY
Start: 2019-01-18 | End: 2019-02-06

## 2019-02-06 RX ORDER — RANITIDINE 150 MG/1
150 TABLET ORAL
Refills: 0 | Status: DISCONTINUED | COMMUNITY
End: 2019-02-06

## 2019-02-06 RX ORDER — PREDNISONE 10 MG/1
10 TABLET ORAL
Qty: 30 | Refills: 6 | Status: DISCONTINUED | COMMUNITY
Start: 2019-01-18 | End: 2019-02-06

## 2019-02-06 RX ORDER — FUROSEMIDE 40 MG/1
40 TABLET ORAL
Refills: 0 | Status: DISCONTINUED | COMMUNITY
End: 2019-02-06

## 2019-02-06 NOTE — CONSULT LETTER
[Dear  ___] : Dear  [unfilled], [Consult Letter:] : I had the pleasure of evaluating your patient, [unfilled]. [Please see my note below.] : Please see my note below. [Consult Closing:] : Thank you very much for allowing me to participate in the care of this patient.  If you have any questions, please do not hesitate to contact me. [Sincerely,] : Sincerely, [DrDave  ___] : Dr. STONER [DrDave ___] : Dr. STONER [[facility], [facility]] : [facility], [facility] [Director, Respiratory Care] : Director, Respiratory Care [[facility]] : [facility] [FreeTextEntry3] : [facility]\par Pul[facility] Critical Care\par Director Pulmonary Division\par Medical Director Respiratory Therapy\par [facility]\par \par

## 2019-02-06 NOTE — HISTORY OF PRESENT ILLNESS
[FreeTextEntry1] : [facility] 1/21-1/28\par pna/RSV, elevated BNP\par cough and dyspnea improving\par finishing  pred taper\par home neb\par no fever, chill, chest pain\par using 02 nocturnally\par remains on coumadin and IVC filter, also atrial fib\par had flu vaccine\par

## 2019-02-06 NOTE — DISCUSSION/SUMMARY
[FreeTextEntry1] :  COPD with asthmatic component Gold II , post hospitalization for RSV, bronchospasm, ? pna\par continue  Duo nebs 3-4 x daily, budesonide bid\par now sig improved, concomitant mod AS, diastolic dysfunction\par lung exam without bronchospasm, normal sp02\par Hx falls, has IVC filter, remains on Coumadin for atrial fibrillation\par ? watchman candidate she will follow with cardiology\par refuses cpap, has dentures cant use appliance, use 02 2 liters nocturnal\par vaccinations this fall, 6 weeks  or sooner if needed with martin\par will repeat CXR \par

## 2019-03-29 NOTE — ED PROVIDER NOTE - CARDIAC, MLM
----- Message from Aimee Oliveira NP sent at 3/29/2019  1:21 PM CDT -----  CBC normal. No signs of anemia or other abnormalities.   Normal rate, regular rhythm.   +2 femoral pulses, +2 peripheral pulses, + 2radial pulses. cap refill less than 2 seconds Normal rate, regular rhythm.   +2 femoral pulses, +2 central pulses, +2 peripheral pulses, + 2radial pulses. cap refill less than 2 seconds

## 2019-04-05 ENCOUNTER — APPOINTMENT (OUTPATIENT)
Dept: PULMONOLOGY | Facility: CLINIC | Age: 84
End: 2019-04-05

## 2019-04-19 ENCOUNTER — APPOINTMENT (OUTPATIENT)
Dept: PULMONOLOGY | Facility: CLINIC | Age: 84
End: 2019-04-19
Payer: MEDICARE

## 2019-04-19 VITALS — OXYGEN SATURATION: 97 % | SYSTOLIC BLOOD PRESSURE: 124 MMHG | DIASTOLIC BLOOD PRESSURE: 80 MMHG | HEART RATE: 63 BPM

## 2019-04-19 PROCEDURE — 99214 OFFICE O/P EST MOD 30 MIN: CPT

## 2019-04-19 NOTE — CONSULT LETTER
[Dear  ___] : Dear  [unfilled], [Consult Letter:] : I had the pleasure of evaluating your patient, [unfilled]. [Please see my note below.] : Please see my note below. [Sincerely,] : Sincerely, [Consult Closing:] : Thank you very much for allowing me to participate in the care of this patient.  If you have any questions, please do not hesitate to contact me. [DrDave  ___] : Dr. STONER [DrDave ___] : Dr. STONER [facility] : [facility] [Director, Respiratory Care] : Director, Respiratory Care [[facility]] : [facility] [FreeTextEntry3] : [facility]\par Pulmonary Critical Care\par Director Pulmonary Division\par Medical Director Respiratory Therapy\par [facility]\par \par

## 2019-04-19 NOTE — DISCUSSION/SUMMARY
[FreeTextEntry1] :  COPD with asthmatic component Gold II , \par at baseline, cxr 3/19 no infiltrate \par continue duo neb bid, budesonide daily\par current exam without bronchospasm, normal sp02\par concomitant mod AS, diastolic dysfunction\par Hx falls, has IVC filter, remains on Coumadin for atrial fibrillation\par ? watchman candidate she will follow with cardiology\par refuses cpap, has dentures cant use appliance, use 02 2 liters nocturnal\par  12 weeks  or sooner if needed with martin\par

## 2019-04-19 NOTE — REVIEW OF SYSTEMS
[Nasal Congestion] : nasal congestion [Arthralgias] : arthralgias [As Noted in HPI] : as noted in HPI [Negative] : Psychiatric [FreeTextEntry9] : followed by Dr Rolle, has PPM

## 2019-04-19 NOTE — PHYSICAL EXAM
[General Appearance - Well Developed] : well developed [Normal Appearance] : normal appearance [General Appearance - Well Nourished] : well nourished [Well Groomed] : well groomed [No Deformities] : no deformities [Neck Appearance] : the appearance of the neck was normal [Heart Rate And Rhythm] : heart rate and rhythm were normal [Heart Sounds] : normal S1 and S2 [Murmurs] : no murmurs present [Exaggerated Use Of Accessory Muscles For Inspiration] : no accessory muscle use [Respiration, Rhythm And Depth] : normal respiratory rhythm and effort [Cyanosis, Localized] : no localized cyanosis [Nail Clubbing] : no clubbing of the fingernails [] : no rash [Oriented To Time, Place, And Person] : oriented to person, place, and time [No Focal Deficits] : no focal deficits [Impaired Insight] : insight and judgment were intact [Affect] : the affect was normal [Memory Recent] : recent memory was not impaired [FreeTextEntry1] : in wheelchair [FreeTextEntry2] : no calf tenderness

## 2019-04-19 NOTE — HISTORY OF PRESENT ILLNESS
[FreeTextEntry1] : at baseline, no new pulmonary complaints\par duo nebs and budesonide\par no fever, chill, chest pain\par remains on coumadin and IVC filter, also atrial fib\par \par

## 2019-04-19 NOTE — PROCEDURE
[FreeTextEntry1] : hospital records, CXR, labs , notes reviewed\par \par CXR 3/19 mild cardiomegaly, no infiltarte

## 2019-05-03 NOTE — H&P PST ADULT - HEIGHT IN INCHES
82M hx of PD pw near fall. patient was at home with son with whom he lives. son notes that patient goes through bouts where he "locks up" and cannot move. this happened yesterday at the base of the stairs and thus the patient's spouse was unable to take her stair lift downstairs, effectively trapping her upstairs. patient notes that he is just old. son wants him admitted to rehab or nursing home. no ha, vision loss, numbness, weakness, cp, sob, nausea, vomiting, fever, rash, bleeding, dysuria. he is more tremulous because he did not take his medication  	Fh and Sh not otherwise contributory 82M hx of PD pw near fall. patient was at home with son with whom he lives. son notes that patient goes through bouts where he "locks up" and cannot move. this happened yesterday at the base of the stairs and thus the patient's spouse was unable to take her stair lift downstairs, effectively trapping her upstairs. Son wants him admitted to rehab or nursing home. no ha, vision loss, numbness, weakness, cp, sob, nausea, vomiting, fever, rash, bleeding, dysuria. he is more tremulous because he did not take his medication  	Fh and Sh not otherwise contributory  Denies symptoms presently 82M hx of PD pw near fall. patient was at home with son with whom he lives. son notes that patient goes through bouts where he "locks up" and cannot move. this happened yesterday at the base of the stairs and thus the patient's spouse was unable to take her stair lift downstairs, effectively trapping her upstairs. Son wants him admitted to rehab or nursing home. no ha, vision loss, numbness, weakness, cp, sob, nausea, vomiting, fever, rash, bleeding, dysuria. he is more tremulous because he did not take his medication 8

## 2019-07-22 ENCOUNTER — APPOINTMENT (OUTPATIENT)
Dept: PULMONOLOGY | Facility: CLINIC | Age: 84
End: 2019-07-22
Payer: MEDICARE

## 2019-07-22 VITALS — OXYGEN SATURATION: 95 % | HEART RATE: 66 BPM

## 2019-07-22 VITALS — WEIGHT: 226 LBS | BODY MASS INDEX: 36.32 KG/M2 | HEIGHT: 66 IN

## 2019-07-22 PROCEDURE — 94010 BREATHING CAPACITY TEST: CPT

## 2019-07-22 PROCEDURE — 99214 OFFICE O/P EST MOD 30 MIN: CPT | Mod: 25

## 2019-07-22 NOTE — DISCUSSION/SUMMARY
[FreeTextEntry1] :  COPD with asthmatic component Gold II , \par at baseline, cxr 3/19 no infiltrate \par continue duo neb prn, mild restriction on spirometry- stable\par current exam without bronchospasm, normal sp02\par concomitant mod AS, diastolic dysfunction\par Hx falls, has IVC filter, remains on Coumadin for atrial fibrillation\par ? watchman candidate she will follow with cardiology\par refuses cpap, has dentures cant use appliance, use 02 2 liters nocturnal\par GI follow up planned\par 5-6  months   or sooner if needed \par

## 2019-07-22 NOTE — PHYSICAL EXAM
[General Appearance - Well Developed] : well developed [Normal Appearance] : normal appearance [Well Groomed] : well groomed [General Appearance - Well Nourished] : well nourished [Neck Appearance] : the appearance of the neck was normal [No Deformities] : no deformities [Heart Rate And Rhythm] : heart rate and rhythm were normal [Heart Sounds] : normal S1 and S2 [Murmurs] : no murmurs present [Respiration, Rhythm And Depth] : normal respiratory rhythm and effort [Exaggerated Use Of Accessory Muscles For Inspiration] : no accessory muscle use [Nail Clubbing] : no clubbing of the fingernails [Cyanosis, Localized] : no localized cyanosis [No Focal Deficits] : no focal deficits [] : no rash [Oriented To Time, Place, And Person] : oriented to person, place, and time [Impaired Insight] : insight and judgment were intact [Affect] : the affect was normal [Memory Recent] : recent memory was not impaired [FreeTextEntry1] : in wheelchair [FreeTextEntry2] : no calf tenderness

## 2019-07-22 NOTE — CONSULT LETTER
[Dear  ___] : Dear  [unfilled], [Please see my note below.] : Please see my note below. [Consult Letter:] : I had the pleasure of evaluating your patient, [unfilled]. [Consult Closing:] : Thank you very much for allowing me to participate in the care of this patient.  If you have any questions, please do not hesitate to contact me. [Sincerely,] : Sincerely, [DrDave  ___] : Dr. STONER [DrDave ___] : Dr. STONER [[facility], [facility]] : [facility], [facility] [Director, Respiratory Care] : Director, Respiratory Care [[facility]] : [facility] [FreeTextEntry3] : [facility] DO [facility]\par Pulmonary Critical Care\par Director Pulmonary Division\par Medical Director Respiratory Therapy\par [facility]\par \par

## 2019-07-22 NOTE — HISTORY OF PRESENT ILLNESS
[FreeTextEntry1] : at baseline, no new pulmonary complaints\par duo nebs and budesonide, using prn\par no fever, chill, chest pain\par remains on coumadin and IVC filter, also atrial fib\par \par

## 2019-10-30 NOTE — PATIENT PROFILE ADULT - DO YOU FEEL LIKE HURTING YOURSELF OR OTHERS?
Spoke with patient and after verifying name and date of birth of patient gave patient test results and any instructions in note per provider. Patient stated understanding. Patient agrees to starting crestor. Prescription pended to Dr Alonzo Everett per her lab note for Crestor 10 mg, 1 tab by mouth nightly, #30, 5RF. no

## 2019-11-12 NOTE — ED PROVIDER NOTE - DIAGNOSIS COUNSELING, MDM
What Stage Is The Melanoma?: Stage 0 What Is The Reason For Today's Visit?: History of Melanoma Year Excised?: 2011 conducted a detailed discussion... I had a detailed discussion with the patient and/or guardian regarding the historical points, exam findings, and any diagnostic results supporting the discharge/admit diagnosis.

## 2019-11-22 ENCOUNTER — APPOINTMENT (OUTPATIENT)
Dept: ORTHOPEDIC SURGERY | Facility: CLINIC | Age: 84
End: 2019-11-22
Payer: MEDICARE

## 2019-11-22 VITALS
BODY MASS INDEX: 36.1 KG/M2 | HEART RATE: 63 BPM | HEIGHT: 67 IN | DIASTOLIC BLOOD PRESSURE: 65 MMHG | WEIGHT: 230 LBS | SYSTOLIC BLOOD PRESSURE: 112 MMHG

## 2019-11-22 DIAGNOSIS — G89.29 PAIN IN LEFT SHOULDER: ICD-10-CM

## 2019-11-22 DIAGNOSIS — M65.20 CALCIFIC TENDINITIS, UNSPECIFIED SITE: ICD-10-CM

## 2019-11-22 DIAGNOSIS — M25.512 PAIN IN LEFT SHOULDER: ICD-10-CM

## 2019-11-22 PROCEDURE — 73030 X-RAY EXAM OF SHOULDER: CPT | Mod: LT

## 2019-11-22 PROCEDURE — 99214 OFFICE O/P EST MOD 30 MIN: CPT

## 2019-11-22 NOTE — PHYSICAL EXAM
[de-identified] : Physical Exam:\par General: Well appearing, no acute distress\par Neurologic: A&Ox3, No focal deficits\par Head: NCAT without abrasions, lacerations, or ecchymosis to head, face, or scalp\par Eyes: No scleral icterus, no gross abnormalities\par Respiratory: Equal chest wall expansion bilaterally, no accessory muscle use\par Lymphatic: No lymphadenopathy palpated\par Skin: Warm and dry\par Psychiatric: Normal mood and affect\par \par Left Shoulder\par ·	Inspection/Palpation: no tenderness, swelling or deformities\par ·	Range of Motion: no crepitus with ROM; Active ; ER at side 15; IR to L1; Passive ; ER at side 15; IR to L1\par ·	Strength: forward elevation in scapular plane [4/5], internal rotation [4/5], external rotation [4/5], adduction [4/5] and abduction [4/5]\par ·	Stability: no joint instability on provocative testing\par ·	Tests: Bustillos test positive, Neer positive, positive drop arm test secondary to pain, bear hug test positive, Napolean sign negative, cross arm adduction negative, lift off sign positive, hornblowers sign negative, speeds test negative, Yergason's test negative, no bicipital groove tenderness, Haddad's Active Compression test negative, whipple test POS, bicep's load II test negative\par \par Right Shoulder\par ·	Inspection/Palpation: no tenderness, swelling or deformities\par ·	Range of Motion: no crepitus with ROM; Active ; ER at side 25; IR to L4; Passive ; ER at side 35; IR to L4\par ·	Strength: forward elevation in scapular plane [4/5], internal rotation [4/5], external rotation [4/5], adduction [4/5] and abduction [4/5]\par ·	Stability: no joint instability on provocative testing\par ·	Tests: Bustillos test positive, Neer positive, positive drop arm test secondary to pain, bear hug test positive, Napolean sign negative, cross arm adduction negative, lift off sign positive, hornblowers sign negative, speeds test negative, Yergason's test negative, no bicipital groove tenderness, Haddad's Active Compression test negative, whipple test POS, bicep's load II test negative [de-identified] : 4 views of the left shoulder were ordered and taken in the office and demonstrate high riding humeral head, degenerative joint disease of the shoulder with joint space narrowing, osteophyte formation, and subchondral sclerosis.

## 2019-11-22 NOTE — HISTORY OF PRESENT ILLNESS
[Worsening] : worsening [5] : an average pain level of 5/10 [3] : a minimum pain level of 3/10 [6] : a maximum pain level of 6/10 [Intermit.] : ~He/She~ states the symptoms seem to be intermittent [Lifting] : worsened by lifting [de-identified] : Ms. PAULSON is a pleasant 86 year old female who presents with left shoulder pain for 10 years. Over the last 2 months she feels her pain to be worsening. Her decrease in ROM has been over the last 10 years per patient and has not changed acutely. She denies numbness/tingling. She admits to history of tearing injury without MRI 10 years ago upon pulling herself up that caused significant pain to her shoulder and bruising to shoulder, down her left arm and into her chest.

## 2019-11-22 NOTE — CONSULT LETTER
[Dear  ___] : Dear  [unfilled], [Consult Letter:] : I had the pleasure of evaluating your patient, [unfilled]. [( Thank you for referring [unfilled] for consultation for _____ )] : Thank you for referring [unfilled] for consultation for [unfilled] [Please see my note below.] : Please see my note below. [Consult Closing:] : Thank you very much for allowing me to participate in the care of this patient.  If you have any questions, please do not hesitate to contact me. [Sincerely,] : Sincerely, [FreeTextEntry2] : LULI LEMUS\par  [FreeTextEntry3] : Freeman Bond DO.\par Sports Medicine \par \par Orthopaedic Surgery\par \par [facility] [place] @ [facility] \par \par [address] \par [address]\[place] [address]\par \par [address] \[address] \[place] [address]\par \par Tel [phone]\par Fax [phone]\par \par For same day and next day orthopedic appointments contact:\par [email or web address] |[phone])\par Appointments available nights and weekends!  \par \par [facility] Physician [facility] [place]\par Visit us at [email or web address]

## 2019-11-22 NOTE — DISCUSSION/SUMMARY
[de-identified] : Ms. PAULSON is a pleasant 86 year old female who presents with left shoulder pain for 10 years. Over the last 2 months she feels her pain to be worsening. Her decrease in ROM has been over the last 10 years per patient and has not changed acutely. She denies numbness/tingling. She admits to history of tearing injury without MRI 10 years ago upon pulling herself up that caused significant pain to her shoulder and bruising to shoulder, down her left arm and into her chest. She denies PT or injection for this issue or prior treatment. Pt on Coumadin, denies history of diabetes but admits to history of poor LE vasculature and current leg ulcers that she is being treated for with wound checks and anti-biotics.\par \par 4 views of the left shoulder were ordered and taken in the office today. They were discussed with the patient and demonstrate high riding humeral head, degenerative joint disease of the shoulder with joint space narrowing, osteophyte formation, and subchondral sclerosis.\par \par Based off of pts current radiographs, current clinical exam and symptoms, I believe her primary complaint to be an acute on chronic issue. Her acute issue to be calcific tendonitis with chronic issue of osteoarthritis and old rotator cuff tear based on the injury history and high-riding humeral head on radiographs. Due to pts current poor healing ulcers we will await the resolution of these and an okay from her primary care doctor regarding us giving her a cortisone injection for calcific tendonitis found on her radiographs. For now pt may start formal PT but should stop it if it caused her more pain until seeing us again for clinical reassessment. She agrees with the above plan and all questions were answered.\par \par \par

## 2020-01-24 ENCOUNTER — APPOINTMENT (OUTPATIENT)
Dept: ORTHOPEDIC SURGERY | Facility: CLINIC | Age: 85
End: 2020-01-24

## 2020-04-26 ENCOUNTER — EMERGENCY (EMERGENCY)
Facility: HOSPITAL | Age: 85
LOS: 1 days | Discharge: HOME | End: 2020-04-26
Attending: EMERGENCY MEDICINE
Payer: MEDICARE

## 2020-04-26 ENCOUNTER — INPATIENT (INPATIENT)
Facility: HOSPITAL | Age: 85
LOS: 13 days | Discharge: HOME | DRG: 291 | End: 2020-05-10
Attending: STUDENT IN AN ORGANIZED HEALTH CARE EDUCATION/TRAINING PROGRAM | Admitting: INTERNAL MEDICINE
Payer: MEDICARE

## 2020-04-26 VITALS
HEART RATE: 69 BPM | HEIGHT: 66 IN | RESPIRATION RATE: 34 BRPM | DIASTOLIC BLOOD PRESSURE: 69 MMHG | OXYGEN SATURATION: 92 % | TEMPERATURE: 99 F | WEIGHT: 229.94 LBS | SYSTOLIC BLOOD PRESSURE: 142 MMHG

## 2020-04-26 VITALS
WEIGHT: 160.06 LBS | OXYGEN SATURATION: 100 % | RESPIRATION RATE: 20 BRPM | TEMPERATURE: 102 F | DIASTOLIC BLOOD PRESSURE: 58 MMHG | HEART RATE: 61 BPM | SYSTOLIC BLOOD PRESSURE: 121 MMHG | HEIGHT: 66 IN

## 2020-04-26 VITALS — OXYGEN SATURATION: 94 %

## 2020-04-26 DIAGNOSIS — H02.403 UNSPECIFIED PTOSIS OF BILATERAL EYELIDS: Chronic | ICD-10-CM

## 2020-04-26 DIAGNOSIS — Z96.651 PRESENCE OF RIGHT ARTIFICIAL KNEE JOINT: Chronic | ICD-10-CM

## 2020-04-26 DIAGNOSIS — Z98.89 OTHER SPECIFIED POSTPROCEDURAL STATES: Chronic | ICD-10-CM

## 2020-04-26 DIAGNOSIS — Z95.828 PRESENCE OF OTHER VASCULAR IMPLANTS AND GRAFTS: Chronic | ICD-10-CM

## 2020-04-26 DIAGNOSIS — Z90.710 ACQUIRED ABSENCE OF BOTH CERVIX AND UTERUS: Chronic | ICD-10-CM

## 2020-04-26 DIAGNOSIS — G56.00 CARPAL TUNNEL SYNDROME, UNSPECIFIED UPPER LIMB: Chronic | ICD-10-CM

## 2020-04-26 DIAGNOSIS — D12.6 BENIGN NEOPLASM OF COLON, UNSPECIFIED: Chronic | ICD-10-CM

## 2020-04-26 DIAGNOSIS — M75.121 COMPLETE ROTATOR CUFF TEAR OR RUPTURE OF RIGHT SHOULDER, NOT SPECIFIED AS TRAUMATIC: Chronic | ICD-10-CM

## 2020-04-26 DIAGNOSIS — Z98.890 OTHER SPECIFIED POSTPROCEDURAL STATES: Chronic | ICD-10-CM

## 2020-04-26 DIAGNOSIS — Z90.49 ACQUIRED ABSENCE OF OTHER SPECIFIED PARTS OF DIGESTIVE TRACT: Chronic | ICD-10-CM

## 2020-04-26 DIAGNOSIS — K64.8 OTHER HEMORRHOIDS: Chronic | ICD-10-CM

## 2020-04-26 DIAGNOSIS — Z95.0 PRESENCE OF CARDIAC PACEMAKER: Chronic | ICD-10-CM

## 2020-04-26 DIAGNOSIS — Z98.42 CATARACT EXTRACTION STATUS, LEFT EYE: Chronic | ICD-10-CM

## 2020-04-26 DIAGNOSIS — H02.409 UNSPECIFIED PTOSIS OF UNSPECIFIED EYELID: Chronic | ICD-10-CM

## 2020-04-26 DIAGNOSIS — Z98.41 CATARACT EXTRACTION STATUS, RIGHT EYE: Chronic | ICD-10-CM

## 2020-04-26 LAB
ALBUMIN SERPL ELPH-MCNC: 3.9 G/DL — SIGNIFICANT CHANGE UP (ref 3.3–5.2)
ALP SERPL-CCNC: 49 U/L — SIGNIFICANT CHANGE UP (ref 40–120)
ALT FLD-CCNC: 28 U/L — SIGNIFICANT CHANGE UP
ANION GAP SERPL CALC-SCNC: 14 MMOL/L — SIGNIFICANT CHANGE UP (ref 5–17)
APTT BLD: 41.9 SEC — HIGH (ref 27.5–36.3)
APTT BLD: 51.9 SEC — HIGH (ref 27.5–36.3)
AST SERPL-CCNC: 38 U/L — HIGH
BASOPHILS # BLD AUTO: 0 K/UL — SIGNIFICANT CHANGE UP (ref 0–0.2)
BASOPHILS NFR BLD AUTO: 0 % — SIGNIFICANT CHANGE UP (ref 0–2)
BILIRUB SERPL-MCNC: 1.7 MG/DL — SIGNIFICANT CHANGE UP (ref 0.4–2)
BUN SERPL-MCNC: 33 MG/DL — HIGH (ref 8–20)
CALCIUM SERPL-MCNC: 9.8 MG/DL — SIGNIFICANT CHANGE UP (ref 8.6–10.2)
CHLORIDE SERPL-SCNC: 91 MMOL/L — LOW (ref 98–107)
CK SERPL-CCNC: 84 U/L — SIGNIFICANT CHANGE UP (ref 25–170)
CO2 SERPL-SCNC: 27 MMOL/L — SIGNIFICANT CHANGE UP (ref 22–29)
CREAT SERPL-MCNC: 1.07 MG/DL — SIGNIFICANT CHANGE UP (ref 0.5–1.3)
CRP SERPL-MCNC: 13.8 MG/DL — HIGH (ref 0–0.4)
D DIMER BLD IA.RAPID-MCNC: 275 NG/ML DDU — HIGH
EOSINOPHIL # BLD AUTO: 0 K/UL — SIGNIFICANT CHANGE UP (ref 0–0.5)
EOSINOPHIL NFR BLD AUTO: 0 % — SIGNIFICANT CHANGE UP (ref 0–6)
FERRITIN SERPL-MCNC: 383 NG/ML — HIGH (ref 15–150)
GLUCOSE SERPL-MCNC: 143 MG/DL — HIGH (ref 70–99)
HCT VFR BLD CALC: 43.3 % — SIGNIFICANT CHANGE UP (ref 34.5–45)
HCT VFR BLD CALC: 43.5 % — SIGNIFICANT CHANGE UP (ref 34.5–45)
HGB BLD-MCNC: 14.2 G/DL — SIGNIFICANT CHANGE UP (ref 11.5–15.5)
HGB BLD-MCNC: 14.6 G/DL — SIGNIFICANT CHANGE UP (ref 11.5–15.5)
INR BLD: 3.14 RATIO — HIGH (ref 0.88–1.16)
INR BLD: 3.48 RATIO — HIGH (ref 0.88–1.16)
LYMPHOCYTES # BLD AUTO: 0 % — LOW (ref 13–44)
LYMPHOCYTES # BLD AUTO: 0 K/UL — LOW (ref 1–3.3)
MCHC RBC-ENTMCNC: 31 PG — SIGNIFICANT CHANGE UP (ref 27–34)
MCHC RBC-ENTMCNC: 31.6 PG — SIGNIFICANT CHANGE UP (ref 27–34)
MCHC RBC-ENTMCNC: 32.8 GM/DL — SIGNIFICANT CHANGE UP (ref 32–36)
MCHC RBC-ENTMCNC: 33.6 GM/DL — SIGNIFICANT CHANGE UP (ref 32–36)
MCV RBC AUTO: 94.2 FL — SIGNIFICANT CHANGE UP (ref 80–100)
MCV RBC AUTO: 94.5 FL — SIGNIFICANT CHANGE UP (ref 80–100)
MONOCYTES # BLD AUTO: 0.57 K/UL — SIGNIFICANT CHANGE UP (ref 0–0.9)
MONOCYTES NFR BLD AUTO: 1.7 % — LOW (ref 2–14)
NEUTROPHILS # BLD AUTO: 32.36 K/UL — HIGH (ref 1.8–7.4)
NEUTROPHILS NFR BLD AUTO: 94.8 % — HIGH (ref 43–77)
NT-PROBNP SERPL-SCNC: 6471 PG/ML — HIGH (ref 0–300)
NT-PROBNP SERPL-SCNC: 8131 PG/ML — HIGH (ref 0–300)
PLATELET # BLD AUTO: 284 K/UL — SIGNIFICANT CHANGE UP (ref 150–400)
PLATELET # BLD AUTO: 295 K/UL — SIGNIFICANT CHANGE UP (ref 150–400)
POTASSIUM SERPL-MCNC: 4.3 MMOL/L — SIGNIFICANT CHANGE UP (ref 3.5–5.3)
POTASSIUM SERPL-SCNC: 4.3 MMOL/L — SIGNIFICANT CHANGE UP (ref 3.5–5.3)
PROCALCITONIN SERPL-MCNC: 1.74 NG/ML — HIGH (ref 0.02–0.1)
PROT SERPL-MCNC: 6.7 G/DL — SIGNIFICANT CHANGE UP (ref 6.6–8.7)
PROTHROM AB SERPL-ACNC: 36.8 SEC — HIGH (ref 10–12.9)
PROTHROM AB SERPL-ACNC: 40.9 SEC — HIGH (ref 10–12.9)
RBC # BLD: 4.58 M/UL — SIGNIFICANT CHANGE UP (ref 3.8–5.2)
RBC # BLD: 4.62 M/UL — SIGNIFICANT CHANGE UP (ref 3.8–5.2)
RBC # FLD: 14.7 % — HIGH (ref 10.3–14.5)
RBC # FLD: 14.7 % — HIGH (ref 10.3–14.5)
SARS-COV-2 RNA SPEC QL NAA+PROBE: SIGNIFICANT CHANGE UP
SODIUM SERPL-SCNC: 132 MMOL/L — LOW (ref 135–145)
TROPONIN T SERPL-MCNC: 0.02 NG/ML — SIGNIFICANT CHANGE UP (ref 0–0.06)
WBC # BLD: 30.03 K/UL — HIGH (ref 3.8–10.5)
WBC # BLD: 33.53 K/UL — HIGH (ref 3.8–10.5)
WBC # FLD AUTO: 30.03 K/UL — HIGH (ref 3.8–10.5)
WBC # FLD AUTO: 33.53 K/UL — HIGH (ref 3.8–10.5)

## 2020-04-26 PROCEDURE — 71045 X-RAY EXAM CHEST 1 VIEW: CPT | Mod: 26,77

## 2020-04-26 PROCEDURE — 71045 X-RAY EXAM CHEST 1 VIEW: CPT | Mod: 26

## 2020-04-26 PROCEDURE — 99285 EMERGENCY DEPT VISIT HI MDM: CPT

## 2020-04-26 PROCEDURE — 93010 ELECTROCARDIOGRAM REPORT: CPT

## 2020-04-26 PROCEDURE — 99291 CRITICAL CARE FIRST HOUR: CPT | Mod: CS

## 2020-04-26 RX ORDER — CEFTRIAXONE 500 MG/1
1000 INJECTION, POWDER, FOR SOLUTION INTRAMUSCULAR; INTRAVENOUS ONCE
Refills: 0 | Status: COMPLETED | OUTPATIENT
Start: 2020-04-26 | End: 2020-04-26

## 2020-04-26 RX ORDER — ONDANSETRON 8 MG/1
4 TABLET, FILM COATED ORAL ONCE
Refills: 0 | Status: COMPLETED | OUTPATIENT
Start: 2020-04-26 | End: 2020-04-26

## 2020-04-26 RX ORDER — AZITHROMYCIN 500 MG/1
500 TABLET, FILM COATED ORAL ONCE
Refills: 0 | Status: COMPLETED | OUTPATIENT
Start: 2020-04-26 | End: 2020-04-26

## 2020-04-26 RX ORDER — ALBUTEROL 90 UG/1
2 AEROSOL, METERED ORAL EVERY 4 HOURS
Refills: 0 | Status: DISCONTINUED | OUTPATIENT
Start: 2020-04-26 | End: 2020-05-01

## 2020-04-26 RX ORDER — FUROSEMIDE 40 MG
40 TABLET ORAL ONCE
Refills: 0 | Status: COMPLETED | OUTPATIENT
Start: 2020-04-26 | End: 2020-04-26

## 2020-04-26 RX ORDER — ACETAMINOPHEN 500 MG
975 TABLET ORAL ONCE
Refills: 0 | Status: COMPLETED | OUTPATIENT
Start: 2020-04-26 | End: 2020-04-26

## 2020-04-26 RX ADMIN — Medication 975 MILLIGRAM(S): at 14:19

## 2020-04-26 RX ADMIN — Medication 40 MILLIGRAM(S): at 23:23

## 2020-04-26 NOTE — ED PROVIDER NOTE - CONSTITUTIONAL, MLM
normal... Awake, alert, oriented to person, place, time/situation and in moderate respiratory distress

## 2020-04-26 NOTE — ED PROVIDER NOTE - EKG ADDITIONAL INFORMATION FREE TEXT
Patient with low grade temp c/o right sided chest pain.  CXR shows lobar PNA Abx given.  Patient mildly hypoxic on room air but significant improvement on minimal nasal cannula.  Patient has oxygen at home.  She was given a pulse oximetry machine and the daughter was updated with plan.  Patient to continue abx, follow-up with PMD and to return if breathing worsens or if hypoxia is not improved with oxygen.

## 2020-04-26 NOTE — ED PROVIDER NOTE - CLINICAL SUMMARY MEDICAL DECISION MAKING FREE TEXT BOX
Pt with recent dx of RUL pneumonia, presenting now with acute CHF sx. WIll diurese with IV Lasix, dose of IV abx as pt has not taken her prescribed abx, and admit for further management.

## 2020-04-26 NOTE — ED ADULT NURSE NOTE - NSIMPLEMENTINTERV_GEN_ALL_ED
Implemented All Fall with Harm Risk Interventions:  [place] to call system. Call bell, personal items and telephone within reach. Instruct patient to call for assistance. Room bathroom lighting operational. Non-slip footwear when patient is off stretcher. Physically safe environment: no spills, clutter or unnecessary equipment. Stretcher in lowest position, wheels locked, appropriate side rails in place. Provide visual cue, wrist band, yellow gown, etc. Monitor gait and stability. Monitor for mental status changes and reorient to person, place, and time. Review medications for side effects contributing to fall risk. Reinforce activity limits and safety measures with patient and family. Provide visual clues: red socks.

## 2020-04-26 NOTE — ED PROVIDER NOTE - CARE PLAN
Principal Discharge DX:	PNA (pneumonia)  Secondary Diagnosis:	Chest pain  Secondary Diagnosis:	Suspected COVID-19 virus infection

## 2020-04-26 NOTE — ED ADULT TRIAGE NOTE - CHIEF COMPLAINT QUOTE
worsening SOB, dx from [facility] this am dx with PNA, -COVID, +rales unable to speak full sentences MD Modi at bedside for evaluation pt denies pain directly to CC.

## 2020-04-26 NOTE — ED PROVIDER NOTE - FAMILY DETAILS FREE TEXT FOR MDM ADDL HISTORY OBTAINED FROM QUESTION
Patient's daughter was called she states that patient is prescribed to use oxygen at night.  She has not been using it at night until she became sick.  Daughter also gave it to her today during the day because patient SOB.  Daughter reports patient had vomiting last night.  They spoke to her PMD office via telemedicine today and was told that the patient may have PNA.

## 2020-04-26 NOTE — ED PROVIDER NOTE - OBJECTIVE STATEMENT
This patient is an 86 year old woman with multiple co-morbidities including A-fib (On coumadin), CHF, COPD, PE with IVC filter and PVD who presents to the ER c/o felling unwell x 2 days.  Patient reports rhinorrhea, right sided chest pain, SOB, nausea, vomiting, fever, and back pain.  She denies recent travel.  Patient lives with her daughter and 2 grandchildren and reports no sick contacts at home.  No known contacts with covid positive persons.  Patient states that she has not been able to eat for the past day and she has not taken any medication for fever.

## 2020-04-26 NOTE — ED PROVIDER NOTE - PATIENT PORTAL LINK FT
You can access the FollowMyHealth Patient Portal offered by [facility] by registering at the following website: [email or web address]. By joining [facility]’s FollowMyHealth portal, you will also be able to view your health information using other applications (apps) compatible with our system.

## 2020-04-26 NOTE — ED PROVIDER NOTE - CARE PLAN
Principal Discharge DX:	Acute systolic congestive heart failure  Secondary Diagnosis:	Pneumonia of right upper lobe due to infectious organism  Secondary Diagnosis:	Hypoxia

## 2020-04-26 NOTE — ED ADULT TRIAGE NOTE - CHIEF COMPLAINT QUOTE
pt c/o right sided chest discomfort and SOB. pt on 2LNC at home, increased to 4LNC. pt denies any sick exposure. no further complaints at this time

## 2020-04-26 NOTE — ED PROVIDER NOTE - OBJECTIVE STATEMENT
An 86 year old female pt presents to the ED c/o SOB, difficulty breathing. Pt was seen in the ED this afternoon secondary to fatigue, difficulty breathing, weakness, lethargy x 2 days. Pt was screen for COVID which came back negative and had labs [place] [address], xray positive for RUL pneumonia. She was sent home with rx for PO abx. Tonight, pt was given chicken soup by granddaughter and shortly after began to develop worsening difficulty breathing and SOB, and was brought into the ED by EMS on 4 l O2. She denies any nausea or vomiting, CP. No further complaints at this time.

## 2020-04-27 DIAGNOSIS — I50.21 ACUTE SYSTOLIC (CONGESTIVE) HEART FAILURE: ICD-10-CM

## 2020-04-27 LAB
ALBUMIN SERPL ELPH-MCNC: 3.8 G/DL — SIGNIFICANT CHANGE UP (ref 3.3–5.2)
ALP SERPL-CCNC: 52 U/L — SIGNIFICANT CHANGE UP (ref 40–120)
ALT FLD-CCNC: 29 U/L — SIGNIFICANT CHANGE UP
ANION GAP SERPL CALC-SCNC: 14 MMOL/L — SIGNIFICANT CHANGE UP (ref 5–17)
ANION GAP SERPL CALC-SCNC: 15 MMOL/L — SIGNIFICANT CHANGE UP (ref 5–17)
APTT BLD: 51.3 SEC — HIGH (ref 27.5–36.3)
AST SERPL-CCNC: 36 U/L — HIGH
BILIRUB SERPL-MCNC: 2.1 MG/DL — HIGH (ref 0.4–2)
BUN SERPL-MCNC: 35 MG/DL — HIGH (ref 8–20)
BUN SERPL-MCNC: 40 MG/DL — HIGH (ref 8–20)
CALCIUM SERPL-MCNC: 9.3 MG/DL — SIGNIFICANT CHANGE UP (ref 8.6–10.2)
CALCIUM SERPL-MCNC: 9.4 MG/DL — SIGNIFICANT CHANGE UP (ref 8.6–10.2)
CHLORIDE SERPL-SCNC: 94 MMOL/L — LOW (ref 98–107)
CHLORIDE SERPL-SCNC: 94 MMOL/L — LOW (ref 98–107)
CO2 SERPL-SCNC: 25 MMOL/L — SIGNIFICANT CHANGE UP (ref 22–29)
CO2 SERPL-SCNC: 26 MMOL/L — SIGNIFICANT CHANGE UP (ref 22–29)
CREAT SERPL-MCNC: 1.06 MG/DL — SIGNIFICANT CHANGE UP (ref 0.5–1.3)
CREAT SERPL-MCNC: 1.29 MG/DL — SIGNIFICANT CHANGE UP (ref 0.5–1.3)
GLUCOSE SERPL-MCNC: 134 MG/DL — HIGH (ref 70–99)
GLUCOSE SERPL-MCNC: 89 MG/DL — SIGNIFICANT CHANGE UP (ref 70–99)
HCT VFR BLD CALC: 40.6 % — SIGNIFICANT CHANGE UP (ref 34.5–45)
HGB BLD-MCNC: 13.1 G/DL — SIGNIFICANT CHANGE UP (ref 11.5–15.5)
INR BLD: 3.78 RATIO — HIGH (ref 0.88–1.16)
LACTATE BLDV-MCNC: 1.5 MMOL/L — SIGNIFICANT CHANGE UP (ref 0.5–2)
LACTATE BLDV-MCNC: 2.2 MMOL/L — HIGH (ref 0.5–2)
MAGNESIUM SERPL-MCNC: 1.4 MG/DL — LOW (ref 1.6–2.6)
MAGNESIUM SERPL-MCNC: 1.9 MG/DL — SIGNIFICANT CHANGE UP (ref 1.6–2.6)
MCHC RBC-ENTMCNC: 30.7 PG — SIGNIFICANT CHANGE UP (ref 27–34)
MCHC RBC-ENTMCNC: 32.3 GM/DL — SIGNIFICANT CHANGE UP (ref 32–36)
MCV RBC AUTO: 95.1 FL — SIGNIFICANT CHANGE UP (ref 80–100)
NEUTROPHILS # BLD AUTO: 35 K/UL — SIGNIFICANT CHANGE UP (ref 1.8–7.4)
NEUTROPHILS NFR BLD AUTO: 94 % — HIGH (ref 43–77)
PLATELET # BLD AUTO: 257 K/UL — SIGNIFICANT CHANGE UP (ref 150–400)
POTASSIUM SERPL-MCNC: 3.9 MMOL/L — SIGNIFICANT CHANGE UP (ref 3.5–5.3)
POTASSIUM SERPL-MCNC: 4 MMOL/L — SIGNIFICANT CHANGE UP (ref 3.5–5.3)
POTASSIUM SERPL-SCNC: 3.9 MMOL/L — SIGNIFICANT CHANGE UP (ref 3.5–5.3)
POTASSIUM SERPL-SCNC: 4 MMOL/L — SIGNIFICANT CHANGE UP (ref 3.5–5.3)
PROT SERPL-MCNC: 6.5 G/DL — LOW (ref 6.6–8.7)
PROTHROM AB SERPL-ACNC: 44.5 SEC — HIGH (ref 10–12.9)
RBC # BLD: 4.27 M/UL — SIGNIFICANT CHANGE UP (ref 3.8–5.2)
RBC # FLD: 14.8 % — HIGH (ref 10.3–14.5)
SARS-COV-2 RNA SPEC QL NAA+PROBE: SIGNIFICANT CHANGE UP
SODIUM SERPL-SCNC: 133 MMOL/L — LOW (ref 135–145)
SODIUM SERPL-SCNC: 135 MMOL/L — SIGNIFICANT CHANGE UP (ref 135–145)
TROPONIN T SERPL-MCNC: 0.02 NG/ML — SIGNIFICANT CHANGE UP (ref 0–0.06)
WBC # BLD: 40.75 K/UL — CRITICAL HIGH (ref 3.8–10.5)
WBC # FLD AUTO: 40.75 K/UL — CRITICAL HIGH (ref 3.8–10.5)

## 2020-04-27 PROCEDURE — 71250 CT THORAX DX C-: CPT | Mod: 26

## 2020-04-27 PROCEDURE — 99223 1ST HOSP IP/OBS HIGH 75: CPT

## 2020-04-27 PROCEDURE — 99497 ADVNCD CARE PLAN 30 MIN: CPT | Mod: 25

## 2020-04-27 RX ORDER — BUDESONIDE, MICRONIZED 100 %
0.5 POWDER (GRAM) MISCELLANEOUS DAILY
Refills: 0 | Status: DISCONTINUED | OUTPATIENT
Start: 2020-04-27 | End: 2020-05-10

## 2020-04-27 RX ORDER — FLUOXETINE HCL 10 MG
20 CAPSULE ORAL DAILY
Refills: 0 | Status: DISCONTINUED | OUTPATIENT
Start: 2020-04-27 | End: 2020-05-10

## 2020-04-27 RX ORDER — FERROUS SULFATE 325(65) MG
325 TABLET ORAL DAILY
Refills: 0 | Status: DISCONTINUED | OUTPATIENT
Start: 2020-04-27 | End: 2020-05-10

## 2020-04-27 RX ORDER — FLUTICASONE PROPIONATE 50 MCG
1 SPRAY, SUSPENSION NASAL
Qty: 0 | Refills: 0 | DISCHARGE

## 2020-04-27 RX ORDER — PIPERACILLIN AND TAZOBACTAM 4; .5 G/20ML; G/20ML
3.38 INJECTION, POWDER, LYOPHILIZED, FOR SOLUTION INTRAVENOUS EVERY 8 HOURS
Refills: 0 | Status: DISCONTINUED | OUTPATIENT
Start: 2020-04-27 | End: 2020-05-02

## 2020-04-27 RX ORDER — IPRATROPIUM/ALBUTEROL SULFATE 18-103MCG
3 AEROSOL WITH ADAPTER (GRAM) INHALATION
Qty: 0 | Refills: 0 | DISCHARGE

## 2020-04-27 RX ORDER — ATORVASTATIN CALCIUM 80 MG/1
20 TABLET, FILM COATED ORAL AT BEDTIME
Refills: 0 | Status: DISCONTINUED | OUTPATIENT
Start: 2020-04-27 | End: 2020-05-10

## 2020-04-27 RX ORDER — OMEPRAZOLE 10 MG/1
1 CAPSULE, DELAYED RELEASE ORAL
Qty: 0 | Refills: 0 | DISCHARGE

## 2020-04-27 RX ORDER — IPRATROPIUM/ALBUTEROL SULFATE 18-103MCG
3 AEROSOL WITH ADAPTER (GRAM) INHALATION EVERY 6 HOURS
Refills: 0 | Status: DISCONTINUED | OUTPATIENT
Start: 2020-04-27 | End: 2020-05-10

## 2020-04-27 RX ORDER — BUDESONIDE, MICRONIZED 100 %
2 POWDER (GRAM) MISCELLANEOUS
Qty: 0 | Refills: 0 | DISCHARGE

## 2020-04-27 RX ORDER — MAGNESIUM SULFATE 500 MG/ML
2 VIAL (ML) INJECTION ONCE
Refills: 0 | Status: COMPLETED | OUTPATIENT
Start: 2020-04-27 | End: 2020-04-27

## 2020-04-27 RX ORDER — CEFTRIAXONE 500 MG/1
1000 INJECTION, POWDER, FOR SOLUTION INTRAMUSCULAR; INTRAVENOUS EVERY 24 HOURS
Refills: 0 | Status: DISCONTINUED | OUTPATIENT
Start: 2020-04-27 | End: 2020-04-27

## 2020-04-27 RX ORDER — CHOLECALCIFEROL (VITAMIN D3) 125 MCG
5000 CAPSULE ORAL DAILY
Refills: 0 | Status: DISCONTINUED | OUTPATIENT
Start: 2020-04-27 | End: 2020-05-10

## 2020-04-27 RX ORDER — ATENOLOL 25 MG/1
25 TABLET ORAL DAILY
Refills: 0 | Status: DISCONTINUED | OUTPATIENT
Start: 2020-04-27 | End: 2020-05-10

## 2020-04-27 RX ORDER — GABAPENTIN 400 MG/1
2 CAPSULE ORAL
Qty: 0 | Refills: 0 | DISCHARGE

## 2020-04-27 RX ORDER — PIPERACILLIN AND TAZOBACTAM 4; .5 G/20ML; G/20ML
3.38 INJECTION, POWDER, LYOPHILIZED, FOR SOLUTION INTRAVENOUS ONCE
Refills: 0 | Status: COMPLETED | OUTPATIENT
Start: 2020-04-27 | End: 2020-04-27

## 2020-04-27 RX ORDER — FLUTICASONE PROPIONATE 50 MCG
1 SPRAY, SUSPENSION NASAL
Refills: 0 | Status: DISCONTINUED | OUTPATIENT
Start: 2020-04-27 | End: 2020-05-10

## 2020-04-27 RX ORDER — FUROSEMIDE 40 MG
40 TABLET ORAL DAILY
Refills: 0 | Status: DISCONTINUED | OUTPATIENT
Start: 2020-04-27 | End: 2020-04-29

## 2020-04-27 RX ORDER — OXYBUTYNIN CHLORIDE 5 MG
10 TABLET ORAL DAILY
Refills: 0 | Status: DISCONTINUED | OUTPATIENT
Start: 2020-04-27 | End: 2020-04-27

## 2020-04-27 RX ORDER — CHOLECALCIFEROL (VITAMIN D3) 125 MCG
1 CAPSULE ORAL
Qty: 0 | Refills: 0 | DISCHARGE

## 2020-04-27 RX ORDER — OXYBUTYNIN CHLORIDE 5 MG
10 TABLET ORAL DAILY
Refills: 0 | Status: DISCONTINUED | OUTPATIENT
Start: 2020-04-27 | End: 2020-05-10

## 2020-04-27 RX ORDER — AZITHROMYCIN 500 MG/1
500 TABLET, FILM COATED ORAL EVERY 24 HOURS
Refills: 0 | Status: DISCONTINUED | OUTPATIENT
Start: 2020-04-27 | End: 2020-04-27

## 2020-04-27 RX ORDER — LISINOPRIL 2.5 MG/1
2.5 TABLET ORAL DAILY
Refills: 0 | Status: DISCONTINUED | OUTPATIENT
Start: 2020-04-27 | End: 2020-05-06

## 2020-04-27 RX ORDER — PANTOPRAZOLE SODIUM 20 MG/1
40 TABLET, DELAYED RELEASE ORAL
Refills: 0 | Status: DISCONTINUED | OUTPATIENT
Start: 2020-04-27 | End: 2020-05-10

## 2020-04-27 RX ORDER — SODIUM CHLORIDE 9 MG/ML
500 INJECTION, SOLUTION INTRAVENOUS
Refills: 0 | Status: COMPLETED | OUTPATIENT
Start: 2020-04-27 | End: 2020-04-27

## 2020-04-27 RX ADMIN — Medication 20 MILLIGRAM(S): at 11:46

## 2020-04-27 RX ADMIN — CEFTRIAXONE 100 MILLIGRAM(S): 500 INJECTION, POWDER, FOR SOLUTION INTRAMUSCULAR; INTRAVENOUS at 01:32

## 2020-04-27 RX ADMIN — ATORVASTATIN CALCIUM 20 MILLIGRAM(S): 80 TABLET, FILM COATED ORAL at 21:03

## 2020-04-27 RX ADMIN — PANTOPRAZOLE SODIUM 40 MILLIGRAM(S): 20 TABLET, DELAYED RELEASE ORAL at 08:30

## 2020-04-27 RX ADMIN — CEFTRIAXONE 1000 MILLIGRAM(S): 500 INJECTION, POWDER, FOR SOLUTION INTRAMUSCULAR; INTRAVENOUS at 01:30

## 2020-04-27 RX ADMIN — AZITHROMYCIN 255 MILLIGRAM(S): 500 TABLET, FILM COATED ORAL at 02:05

## 2020-04-27 RX ADMIN — PIPERACILLIN AND TAZOBACTAM 25 GRAM(S): 4; .5 INJECTION, POWDER, LYOPHILIZED, FOR SOLUTION INTRAVENOUS at 17:03

## 2020-04-27 RX ADMIN — Medication 5000 UNIT(S): at 11:46

## 2020-04-27 RX ADMIN — ONDANSETRON 4 MILLIGRAM(S): 8 TABLET, FILM COATED ORAL at 00:01

## 2020-04-27 RX ADMIN — SODIUM CHLORIDE 999 MILLILITER(S): 9 INJECTION, SOLUTION INTRAVENOUS at 08:39

## 2020-04-27 RX ADMIN — Medication 60 MILLIGRAM(S): at 10:15

## 2020-04-27 RX ADMIN — Medication 10 MILLIGRAM(S): at 11:46

## 2020-04-27 RX ADMIN — Medication 60 MILLIGRAM(S): at 17:01

## 2020-04-27 RX ADMIN — Medication 0.5 MILLIGRAM(S): at 08:43

## 2020-04-27 RX ADMIN — Medication 325 MILLIGRAM(S): at 11:46

## 2020-04-27 RX ADMIN — PIPERACILLIN AND TAZOBACTAM 200 GRAM(S): 4; .5 INJECTION, POWDER, LYOPHILIZED, FOR SOLUTION INTRAVENOUS at 10:15

## 2020-04-27 RX ADMIN — Medication 50 GRAM(S): at 03:40

## 2020-04-27 NOTE — H&P ADULT - NSICDXPASTSURGICALHX_GEN_ALL_CORE_FT
PAST SURGICAL HISTORY:  Acquired ptosis of eyelid 2014    Acute carpal tunnel syndrome with surgery    Cardiac pacemaker     Complete tear of rotator cuff, right 1998    H/O atrioventricular eva ablation     H/O cardiac catheterization 2005 & 2007- no stents    H/O laminectomy 3/4 lumbar    2002    H/O repair of right rotator cuff     H/O total hysterectomy 1971    History of cholecystectomy     History of lumbar laminectomy L3-4    History of total knee arthroplasty, right     Internal hemorrhoid banded and cauterized    Presence of inferior vena cava filter     Ptosis, both eyelids     Rectal adenoma s/p excision 2005    S/P cataract surgery, left June 2010    S/P cataract surgery, right May 2010    S/P cholecystectomy 1991    S/P IVC filter placed 1/9    S/P knee replacement, right 2007    Status post Mohs surgery for basal cell carcinoma left knee

## 2020-04-27 NOTE — CONSULT NOTE ADULT - SUBJECTIVE AND OBJECTIVE BOX
[facility], [facility]                              [address] [address]                                                 PHONE: [phone]                                                 FAX: [phone]  ------------------------------------------------------------------------------------------------    86y Female with past medical history as under sent in for shortness of breath. She was initially seen in [facility] ED yesterday for left sided chest pain and dyspnea associated with nausea and vomiting for a few days.  She was found to have leukocytosis with WBC 33K and right sided PNA on CXR and was d/c to home on PO antibiotics. She also endorses worsening orthopnea for the past few days but denies any recent worsening of her leg swelling as this is usually controlled at baseline with her compression stockings.  Pt is on Lasix 40mg and 20mg on alternating days and reports that she missed 2 days of Lasix as she was not feeling well.     On admission, pt febrile, normotensive with mid tachypnea and her O2 requirements was increased from 2 to 4L. CXR showed RUL PNA with trace right pleural effusion.  Labs show elevated procalcitonin, CRP and proBNP.  In ED, received IV Lasix 40mg and IV Zofran.  COVID-19 PCR negative from 4/26.    At the time of evaluation, pt reports feeling better but not back to baseline. She follows with Dr. 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  CHF (congestive heart failure)  GERD (gastroesophageal reflux disease)  COPD (chronic obstructive pulmonary disease)  HTN (hypertension)  Bleeding internal hemorrhoids  Pacemaker  Atrial fibrillation  Fall  TIA (transient ischemic attack): 2000  Status post Mohs surgery for basal cell carcinoma: left knee  Cardiac pacemaker  Presence of inferior vena cava filter  H/O atrioventricular eva ablation  Ptosis, both eyelids  Internal hemorrhoid: banded and cauterized  History of total knee arthroplasty, right  History of lumbar laminectomy: L3-4  History of cholecystectomy  H/O repair of right rotator cuff  Rectal adenoma: s/p excision 2005  S/P IVC filter: placed 1/9  Acute carpal tunnel syndrome: with surgery  H/O cardiac catheterization: 2005 &amp; 2007- no stents  Acquired ptosis of eyelid: 2014  S/P cataract surgery, left: June 2010  S/P cataract surgery, right: May 2010  S/P knee replacement, right: 2007  H/O laminectomy: 3/4 lumbar    2002  Complete tear of rotator cuff, right: 1998  S/P cholecystectomy: 1991  H/O total hysterectomy: 1971      adhesives (Blisters)  codeine (Other; Nausea)  Morphine Sulfate (Other; Nausea)      Review of Systems:  Positive for shortness of breath, dyspnea on exertion, cough  Rest of the systems were reviewed and was negative.     Family history:   No pertinent family history in first degree relative of early CAD, sudden cardiac death or  congenital heart disease    Social History:  No smoking   No alcohol  No other drug use    Vital Signs Last 24 Hrs  T(C): 37 (27 Apr 2020 08:00), Max: 38.7 (26 Apr 2020 12:08)  T(F): 98.6 (27 Apr 2020 08:00), Max: 101.6 (26 Apr 2020 12:08)  HR: 66 (27 Apr 2020 08:00) (60 - 69)  BP: 83/53 (27 Apr 2020 08:00) (83/53 - 142/69)  BP(mean): --  RR: 22 (27 Apr 2020 08:00) (20 - 34)  SpO2: 94% (27 Apr 2020 08:00) (91% - 100%)    PHYSICAL EXAM:  Constitutional: Appears well developed, well nourished; alert and co-operative  HEENT:     Head: Normocephalic and atraumatic  Eyes: Conjunctiva normal, No scleral icterus  Neck: Supple, No JVD  Mouth/Throat: Mucous membranes are normal. Mucosa are not pale or dry.  Cardiovascular: regular S1, S2 + ESM  Respiratory: Lungs clear to auscultation; + basal crepitations  Gastrointestinal:  Soft, Non-tender, + BS	  Musculoskeletal: Normal range of motion. Mild edema  chronic venous stasis  Skin: Warm and dry. No cyanosis . No diaphoresis.  Neurologic: Alert oriented to time place and person. Normal strength and no tremor.  Psychiatric: Normal mood and affect, Speech is normal and behavior is normal.        LABS:                        13.1   40.75 )-----------( 257      ( 27 Apr 2020 07:40 )             40.6     04-27    133<L>  |  94<L>  |  40.0<H>  ----------------------------<  89  4.0   |  25.0  |  1.29    Ca    9.4      27 Apr 2020 07:40  Mg     1.9     04-27    TPro  6.5<L>  /  Alb  3.8  /  TBili  2.1<H>  /  DBili  x   /  AST  36<H>  /  ALT  29  /  AlkPhos  52  04-26    CARDIAC MARKERS ( 26 Apr 2020 23:31 )  x     / 0.02 ng/mL / x     / x     / x      CARDIAC MARKERS ( 26 Apr 2020 13:58 )  x     / 0.02 ng/mL / 84 U/L / x     / x          PT/INR - ( 27 Apr 2020 07:40 )   PT: 44.5 sec;   INR: 3.78 ratio         PTT - ( 27 Apr 2020 07:40 )  PTT:51.3 sec    RADIOLOGY & ADDITIONAL STUDIES: (reviewed)  CXR was independently reviewed and demonstrated: bilateral opacities    CARDIOLOGY TESTING:(reviewed)     ECG (Independent visualization): AF with paced rhythm    ECHOCARDIOGRAM :  < from: TTE Echo Complete w/Doppler (01.26.19 @ 10:53) >  Summary:   1. Left ventricular ejection fraction, by visual estimation, is 50 to   55%.   2. Spectral Doppler shows restrictive pattern of left ventricular   myocardial filling (Grade III diastolic dysfunction).   3. Mild to moderate mitral annular calcification.   4. Moderately decreased posterior mitral leaflet mobility.   5. Thickening and calcification of the anterior and posterior mitral   valve leaflets.   6. Trace tricuspid regurgitation.   7. Trace pulmonic valve regurgitation.   8. Peak transaortic gradient is 17.4 mmHg, mean transaortic gradient   equals 9.6 mmHg, the calculated aortic valve area equals 1.32 cm² by the   continuity equation consistent with moderate aortic stenosis.    < end of copied text >    MEDICATIONS:(reviewed)  Home Medications:  Home Medications:  atenolol 25 mg oral tablet: 1 tab(s) orally once a day (27 Apr 2020 04:27)  atorvastatin 20 mg oral tablet: 1 tab(s) orally once a day (at bedtime) (27 Apr 2020 04:27)  budesonide 0.5 mg/2 mL inhalation suspension: 2 milliliter(s) inhaled once a day (27 Apr 2020 04:27)  ferrous sulfate 325 mg (65 mg elemental iron) oral tablet: 1 tab(s) orally once a day (27 Apr 2020 04:27)  FLUoxetine 20 mg oral capsule: 1 cap(s) orally once a day (27 Apr 2020 04:27)  fluticasone 50 mcg/inh nasal spray: 1 spray(s) nasal once a day (27 Apr 2020 04:27)  furosemide: 40mg and 20 mg 1 tab(s) orally on alternating days (27 Apr 2020 04:27)  ipratropium-albuterol 0.5 mg-2.5 mg/3 mLinhalation solution: 3 milliliter(s) inhaled every 6 hours (27 Apr 2020 04:27)  omeprazole 20 mg oral delayed release capsule: 1 cap(s) orally once a day (27 Apr 2020 04:27)  oxybutynin 10 mg/24 hr oral tablet, extended release: 1 tab(s) orally once a day (27 Apr 2020 04:27)  Vitamin D3 5000 intl units (125 mcg) oral capsule: 1 cap(s) orally once a day (27 Apr 2020 04:27)  warfarin 4 mg oral tablet: 1 tab(s) orally once a day (27 Apr 2020 04:27)      MEDICATIONS  (STANDING):  ATENolol  Tablet 25 milliGRAM(s) Oral daily  atorvastatin 20 milliGRAM(s) Oral at bedtime  buDESOnide    Inhalation Suspension 0.5 milliGRAM(s) Inhalation daily  cholecalciferol 5000 Unit(s) Oral daily  ferrous    sulfate 325 milliGRAM(s) Oral daily  FLUoxetine 20 milliGRAM(s) Oral daily  furosemide   Injectable 40 milliGRAM(s) IV Push daily  lisinopril 2.5 milliGRAM(s) Oral daily  methylPREDNISolone sodium succinate Injectable 60 milliGRAM(s) IV Push every 8 hours  oxybutynin XL 10 milliGRAM(s) Oral daily  pantoprazole    Tablet 40 milliGRAM(s) Oral before breakfast  piperacillin/tazobactam IVPB.. 3.375 Gram(s) IV Intermittent every 8 hours    MEDICATIONS  (PRN):  albuterol/ipratropium for Nebulization 3 milliLiter(s) Nebulizer every 6 hours PRN Shortness of Breath and/or Wheezing  fluticasone propionate 50 MICROgram(s)/spray Nasal Spray 1 Spray(s) Both Nostrils two times a day PRN Nasal Congestion

## 2020-04-27 NOTE — H&P ADULT - HISTORY OF PRESENT ILLNESS
86yoF extensive PMhx including COPD on 2L home O2, HFpEF (50-55%) Afib s/p ablation and PPM, on coumadin, prior TIA, LLE DVT and PE (2014) s/p IVC filter, JUAN non-compliant with CPAP presenting with worsening dyspnea.  Pt was initially seen in [facility] ED in earlier this afternoon for right sided chest pain and dyspnea associated with nausea and vomiting for a few days.  She was found to have leukocytosis with WBC 33K and right sided PNA on CXR and was d/c to home on PO antibiotics.   Pt unable to recall which abx she was prescribed, but didn’t get a chance to pick it up from the pharmacy as she beginning having worsening dyspnea at rest prompting her to return to the ED.  Pt also endorses worsening orthopnea for the past few days but denies any recent worsening of her leg swelling as this is usually controlled at baseline with her compression stockings.  Pt is on Lasix 40mg and 20mg on alternating days and reports that she missed 2 days of Lasix as she was not feeling well.      On admission, pt febrile, normotensive with mid tachypnea and her O2 requirements was increased from 2 to 4L.  CXR showed RUL PNA with trace right pleural effusion.  Labs show elevated procalcitonin, CRP and proBNP.  In ED, received IV Lasix 40mg and IV Zofran.  COVID-19 PCR negative from 4/26.

## 2020-04-27 NOTE — H&P ADULT - NSHPPHYSICALEXAM_GEN_ALL_CORE
Vital Signs Last 24 Hrs  T(C): 37.1 (26 Apr 2020 23:11), Max: 38.7 (26 Apr 2020 12:08)  T(F): 98.8 (26 Apr 2020 23:11), Max: 101.6 (26 Apr 2020 12:08)  HR: 69 (26 Apr 2020 23:11) (60 - 69)  BP: 142/69 (26 Apr 2020 23:11) (105/56 - 142/69)  BP(mean): --  RR: 34 (26 Apr 2020 23:11) (20 - 34)  SpO2: 92% (26 Apr 2020 23:11) (91% - 100%)

## 2020-04-27 NOTE — ED ADULT NURSE REASSESSMENT NOTE - NS ED NURSE REASSESS COMMENT FT1
Report to Kristen CHUNG
Received pt at this time/ pt awaiting bed assignment/ pt offers no complaints at this time

## 2020-04-27 NOTE — H&P ADULT - NSICDXPASTMEDICALHX_GEN_ALL_CORE_FT
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

## 2020-04-27 NOTE — H&P ADULT - ASSESSMENT
86yoF extensive PMhx including COPD on 2L home O2, HFpEF (50-55%) Afib s/p ablation and PPM, on coumadin, prior TIA, LLE DVT and PE (2014) s/p IVC filter, JUAN non-compliant with CPAP who initially presented to ED with right sided chest pain, dyspnea found to have RUL PNA, then was d/c'ed to home and returned to ED the same day with worsening symptoms and increased O2 requirements, being admitted for acute on chronic hypoxemic respiratory failure due to PNA and decompensated CHF    Acute on chronic hypoxemic respiratory failure due to PNA and decompensated CHF  -Admit to monitored bed with   -Received IV azithromycin and ceftriaxone in ED, will continue for CAP coverage  -IV Lasix 40mg for diuresis   -Increased O2 requirements from 2 to 4L   -Wean O2 back to baseline requirements    Acute decompensation CHF in setting of medication non-compliance  -Pt missed 2 days of PO Lasix prior to admission  -TTE in 1/26/2019 showed grade 3 diastolic dysfunction with EF of 50-55%  -Monitored bed  -Received IV Lasix 40mg in ED, will continue at current dose  -Since precipitating factor due to med non-compliance will not repeat TTE at this time  -1L fluid restriction   -Cardiology consulted ([place]    Hypomagnesemia   -Admission Mg 1.4, repleted in ED   -Monitor BMP and replete accordingly     Afib  -On coumadin, admission INR   -Atenolol resumed    HTN  -On atenolol and lisinopril    COPD  -Not in acute exacerbation   -Budesonide resumed, DuoNebs PRN    Depression  -Fluoxetine resumed    Overactive bladder  -Oxybutynin resumed    Prophylactic measure  -On coumadin 86yoF extensive PMhx including COPD on 2L home O2, HFpEF (50-55%) Afib s/p ablation and PPM, on coumadin, prior TIA, LLE DVT and PE (2014) s/p IVC filter, JUAN non-compliant with CPAP who initially presented to ED with right sided chest pain, dyspnea found to have RUL PNA, then was d/c'ed to home and returned to ED the same day with worsening symptoms and increased O2 requirements, being admitted for acute on chronic hypoxemic respiratory failure due to PNA and decompensated CHF    Acute on chronic hypoxemic respiratory failure due to PNA and decompensated CHF  -Admit to monitored bed with   -Received IV azithromycin and ceftriaxone in ED, will continue for CAP coverage  -IV Lasix 40mg for diuresis   -Increased O2 requirements from 2 to 4L   -Wean O2 back to baseline requirements    Acute decompensation CHF in setting of medication non-compliance  -Pt missed 2 days of PO Lasix prior to admission  -TTE in 1/26/2019 showed grade 3 diastolic dysfunction with EF of 50-55%  -Monitored bed  -Received IV Lasix 40mg in ED, will continue at current dose  -Since precipitating factor due to med non-compliance will not repeat TTE at this time  -1L fluid restriction   -Cardiology eval ([place] consulted)    Hypomagnesemia   -Admission Mg 1.4, repleted in ED   -Monitor BMP and replete accordingly     Afib  -On coumadin, admission INR   -Atenolol resumed    HTN  -On atenolol and lisinopril    COPD  -Not in acute exacerbation   -Budesonide resumed, DuoNebs PRN    Depression  -Fluoxetine resumed    Overactive bladder  -Oxybutynin resumed    Prophylactic measure  -On coumadin 86yoF extensive PMhx including COPD on 2L home O2, HFpEF (50-55%) Afib s/p ablation and PPM, on coumadin, prior TIA, LLE DVT and PE (2014) s/p IVC filter, JUAN non-compliant with CPAP who initially presented to ED with right sided chest pain, dyspnea found to have RUL PNA, then was d/c'ed to home and returned to ED the same day with worsening symptoms and increased O2 requirements, being admitted for acute on chronic hypoxemic respiratory failure due to PNA and decompensated CHF    Acute on chronic hypoxemic respiratory failure due to PNA and decompensated CHF  -Admit to monitored bed with   -Received IV azithromycin and ceftriaxone in ED, will continue for CAP coverage  -IV Lasix 40mg for diuresis   -Increased O2 requirements from 2 to 4L   -Wean O2 back to baseline requirements    Acute decompensation CHF in setting of medication non-compliance  -Pt missed 2 days of PO Lasix prior to admission  -TTE in 1/26/2019 showed grade 3 diastolic dysfunction with EF of 50-55%  -Monitored bed  -Received IV Lasix 40mg in ED, will continue at current dose  -Since precipitating factor due to med non-compliance will not repeat TTE at this time  -1L fluid restriction   -Cardiology eval ([place] consulted)    Hypomagnesemia   -Admission Mg 1.4, repleted in ED   -Monitor BMP and replete accordingly     Afib  -On coumadin, admission INR slightly supratherapeutic at 3.58  -Atenolol resumed    HTN  -On atenolol and lisinopril    COPD  -Not in acute exacerbation   -Budesonide resumed, DuoNebs PRN    Depression  -Fluoxetine resumed    Overactive bladder  -Oxybutynin resumed    Prophylactic measure  -On coumadin

## 2020-04-27 NOTE — H&P ADULT - NSHPLABSRESULTS_GEN_ALL_CORE
14.6   30.03 )-----------( 284      ( 26 Apr 2020 23:31 )             43.5         04-26    135  |  94<L>  |  35.0<H>  ----------------------------<  134<H>  3.9   |  26.0  |  1.06    Ca    9.3      26 Apr 2020 23:31  Mg     1.4     04-26    TPro  6.5<L>  /  Alb  3.8  /  TBili  2.1<H>  /  DBili  x   /  AST  36<H>  /  ALT  29  /  AlkPhos  52  04-26

## 2020-04-27 NOTE — GOALS OF CARE CONVERSATION - ADVANCED CARE PLANNING - CONVERSATION DETAILS
Pt reports both of her children as her designated HCPs and identifies her daughter as her primary contact.  Pt also reports having a living will and states that in the event of a medical emergency (either severe respiratory failure or cardiac arrest) that she would not want to be on a breathing machine or to receive any chest compressions.  Pt amenable to filling out MOLST form and this was filled out and signed by her.  Pt is FULL CODE.     Also inquired about other interventions and pt still desires medical admission for acute issues, trial of BiPAP, IV abx or trial of IVF.  Pt states that she would not want to have a PEG if unable to tolerate PO as ‘her mother had a PEG and did poorly with it so she would never want that for herself’. Pt reports both of her children as her designated HCPs and identifies her daughter as her primary contact.  Pt also reports having a living will and states that in the event of a medical emergency (either severe respiratory failure or cardiac arrest) that she would not want to be on a breathing machine or to receive any chest compressions.  Pt amenable to filling out MOLST form and this was filled out and signed by her.  Pt is DNR/DNI.     Also inquired about other interventions and pt still desires medical admission for acute issues, trial of BiPAP, IV abx or trial of IVF.  Pt states that she would not want to have a PEG if unable to tolerate PO as ‘her mother had a PEG and did poorly with it so she would never want that for herself’.    Hard copy of MOLST form provided back to pt.  Copies made to be scanned into alpha and placed in chart.

## 2020-04-27 NOTE — H&P ADULT - NSHPSOCIALHISTORY_GEN_ALL_CORE
, lives with daughter and grand-daughters  Former smoker, 1-1.5PPD from age 17 to 40, quit around 1980

## 2020-04-27 NOTE — CHART NOTE - NSCHARTNOTEFT_GEN_A_CORE
Pt seen and examined at bedside. Pt has hx of CHF, COPD, COVID negative, but xray and CT findings consistent with RUL PNA. Pt uses a NC at home, and has been feeling SOB, and LOPEZ for 3 days. Daughter updated on pts plan of care. Pt is DNR/DNI. GOC discussed.     Rest of events as per H&P Pt seen and examined at bedside. Pt has hx of CHF, COPD, COVID negative, but xray and CT findings consistent with RUL PNA.  NC O2 at home . Leukocytosis markedly elevated.  Will trend.  Supratherapuetic  and coumadin on hold.    Check Lactate after plasma bolus.     Cod status confirmed by patient.

## 2020-04-27 NOTE — CONSULT NOTE ADULT - ASSESSMENT
86y Female with prior history of permanent AF s/p AVN ablation, BiV pacemaker, chronic venous stasis, prior PE with IVC filter, obesity, diastolic HF with shortness of breath, edema and weakness.     Shortness of breath  - Likely multifactorial   - continue iv lasix     HTN  - BP controlled    Dyslipidemia  - Continue statin    AF  - Rates controlled  - on coumadin    Edema  - iv lasix  for now

## 2020-04-28 LAB
ANION GAP SERPL CALC-SCNC: 13 MMOL/L — SIGNIFICANT CHANGE UP (ref 5–17)
APTT BLD: 53.7 SEC — HIGH (ref 27.5–36.3)
BASOPHILS # BLD AUTO: 0.04 K/UL — SIGNIFICANT CHANGE UP (ref 0–0.2)
BASOPHILS NFR BLD AUTO: 0.2 % — SIGNIFICANT CHANGE UP (ref 0–2)
BUN SERPL-MCNC: 46 MG/DL — HIGH (ref 8–20)
CALCIUM SERPL-MCNC: 9.3 MG/DL — SIGNIFICANT CHANGE UP (ref 8.6–10.2)
CHLORIDE SERPL-SCNC: 95 MMOL/L — LOW (ref 98–107)
CO2 SERPL-SCNC: 28 MMOL/L — SIGNIFICANT CHANGE UP (ref 22–29)
CREAT SERPL-MCNC: 1.19 MG/DL — SIGNIFICANT CHANGE UP (ref 0.5–1.3)
D DIMER BLD IA.RAPID-MCNC: 391 NG/ML DDU — HIGH
EOSINOPHIL # BLD AUTO: 0.09 K/UL — SIGNIFICANT CHANGE UP (ref 0–0.5)
EOSINOPHIL NFR BLD AUTO: 0.4 % — SIGNIFICANT CHANGE UP (ref 0–6)
GLUCOSE SERPL-MCNC: 130 MG/DL — HIGH (ref 70–99)
HCT VFR BLD CALC: 42.4 % — SIGNIFICANT CHANGE UP (ref 34.5–45)
HGB BLD-MCNC: 13.6 G/DL — SIGNIFICANT CHANGE UP (ref 11.5–15.5)
IMM GRANULOCYTES NFR BLD AUTO: 0.9 % — SIGNIFICANT CHANGE UP (ref 0–1.5)
INR BLD: 3.87 RATIO — HIGH (ref 0.88–1.16)
LYMPHOCYTES # BLD AUTO: 0.53 K/UL — LOW (ref 1–3.3)
LYMPHOCYTES # BLD AUTO: 2.3 % — LOW (ref 13–44)
MAGNESIUM SERPL-MCNC: 2.1 MG/DL — SIGNIFICANT CHANGE UP (ref 1.6–2.6)
MCHC RBC-ENTMCNC: 30.6 PG — SIGNIFICANT CHANGE UP (ref 27–34)
MCHC RBC-ENTMCNC: 32.1 GM/DL — SIGNIFICANT CHANGE UP (ref 32–36)
MCV RBC AUTO: 95.3 FL — SIGNIFICANT CHANGE UP (ref 80–100)
MONOCYTES # BLD AUTO: 0.19 K/UL — SIGNIFICANT CHANGE UP (ref 0–0.9)
MONOCYTES NFR BLD AUTO: 0.8 % — LOW (ref 2–14)
NEUTROPHILS # BLD AUTO: 22.31 K/UL — HIGH (ref 1.8–7.4)
NEUTROPHILS NFR BLD AUTO: 95.4 % — HIGH (ref 43–77)
PHOSPHATE SERPL-MCNC: 3.5 MG/DL — SIGNIFICANT CHANGE UP (ref 2.4–4.7)
PLATELET # BLD AUTO: 265 K/UL — SIGNIFICANT CHANGE UP (ref 150–400)
POTASSIUM SERPL-MCNC: 3.7 MMOL/L — SIGNIFICANT CHANGE UP (ref 3.5–5.3)
POTASSIUM SERPL-SCNC: 3.7 MMOL/L — SIGNIFICANT CHANGE UP (ref 3.5–5.3)
PROTHROM AB SERPL-ACNC: 45.6 SEC — HIGH (ref 10–12.9)
RBC # BLD: 4.45 M/UL — SIGNIFICANT CHANGE UP (ref 3.8–5.2)
RBC # FLD: 14.8 % — HIGH (ref 10.3–14.5)
SODIUM SERPL-SCNC: 135 MMOL/L — SIGNIFICANT CHANGE UP (ref 135–145)
WBC # BLD: 23.38 K/UL — HIGH (ref 3.8–10.5)
WBC # FLD AUTO: 23.38 K/UL — HIGH (ref 3.8–10.5)

## 2020-04-28 PROCEDURE — 99233 SBSQ HOSP IP/OBS HIGH 50: CPT

## 2020-04-28 PROCEDURE — 93306 TTE W/DOPPLER COMPLETE: CPT | Mod: 26

## 2020-04-28 RX ORDER — SODIUM CHLORIDE 9 MG/ML
1000 INJECTION INTRAMUSCULAR; INTRAVENOUS; SUBCUTANEOUS
Refills: 0 | Status: DISCONTINUED | OUTPATIENT
Start: 2020-04-28 | End: 2020-04-28

## 2020-04-28 RX ORDER — THIAMINE MONONITRATE (VIT B1) 100 MG
200 TABLET ORAL
Refills: 0 | Status: DISCONTINUED | OUTPATIENT
Start: 2020-04-28 | End: 2020-05-10

## 2020-04-28 RX ORDER — ASCORBIC ACID 60 MG
500 TABLET,CHEWABLE ORAL THREE TIMES A DAY
Refills: 0 | Status: DISCONTINUED | OUTPATIENT
Start: 2020-04-28 | End: 2020-05-10

## 2020-04-28 RX ADMIN — Medication 40 MILLIGRAM(S): at 04:41

## 2020-04-28 RX ADMIN — LISINOPRIL 2.5 MILLIGRAM(S): 2.5 TABLET ORAL at 04:42

## 2020-04-28 RX ADMIN — PIPERACILLIN AND TAZOBACTAM 25 GRAM(S): 4; .5 INJECTION, POWDER, LYOPHILIZED, FOR SOLUTION INTRAVENOUS at 09:37

## 2020-04-28 RX ADMIN — Medication 10 MILLIGRAM(S): at 12:04

## 2020-04-28 RX ADMIN — Medication 5000 UNIT(S): at 12:03

## 2020-04-28 RX ADMIN — Medication 325 MILLIGRAM(S): at 12:03

## 2020-04-28 RX ADMIN — Medication 60 MILLIGRAM(S): at 12:04

## 2020-04-28 RX ADMIN — PIPERACILLIN AND TAZOBACTAM 25 GRAM(S): 4; .5 INJECTION, POWDER, LYOPHILIZED, FOR SOLUTION INTRAVENOUS at 17:50

## 2020-04-28 RX ADMIN — ATENOLOL 25 MILLIGRAM(S): 25 TABLET ORAL at 04:41

## 2020-04-28 RX ADMIN — PIPERACILLIN AND TAZOBACTAM 25 GRAM(S): 4; .5 INJECTION, POWDER, LYOPHILIZED, FOR SOLUTION INTRAVENOUS at 04:05

## 2020-04-28 RX ADMIN — Medication 20 MILLIGRAM(S): at 12:04

## 2020-04-28 RX ADMIN — Medication 500 MILLIGRAM(S): at 12:03

## 2020-04-28 RX ADMIN — PANTOPRAZOLE SODIUM 40 MILLIGRAM(S): 20 TABLET, DELAYED RELEASE ORAL at 09:22

## 2020-04-28 RX ADMIN — Medication 60 MILLIGRAM(S): at 04:05

## 2020-04-28 RX ADMIN — Medication 1 TABLET(S): at 12:04

## 2020-04-28 NOTE — PROGRESS NOTE ADULT - SUBJECTIVE AND OBJECTIVE BOX
[facility], THE HEART CENTER                                   [address] [place] [address]                                                      PHONE: [phone]                                                         FAX: [phone]  [email or web address]  ---------------------------------------------------------------------------------------------------------------------------------    Overnight events/patient complaints:    INTERPRETATION OF TELEMETRY (personally reviewed):    RADIOLOGY & ADDITIONAL STUDIES: (reviewed)  CXR was independently reviewed and demonstrated: bilateral opacities    CARDIOLOGY TESTING:(reviewed)     ECG (Independent visualization): AF with paced rhythm    ECHOCARDIOGRAM :  < from: TTE Echo Complete w/Doppler (01.26.19 @ 10:53) >  Summary:   1. Left ventricular ejection fraction, by visual estimation, is 50 to   55%.   2. Spectral Doppler shows restrictive pattern of left ventricular   myocardial filling (Grade III diastolic dysfunction).   3. Mild to moderate mitral annular calcification.   4. Moderately decreased posterior mitral leaflet mobility.   5. Thickening and calcification of the anterior and posterior mitral   valve leaflets.   6. Trace tricuspid regurgitation.   7. Trace pulmonic valve regurgitation.   8. Peak transaortic gradient is 17.4 mmHg, mean transaortic gradient   equals 9.6 mmHg, the calculated aortic valve area equals 1.32 cm² by the   continuity equation consistent with moderate aortic stenosis.      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  CHF (congestive heart failure)  GERD (gastroesophageal reflux disease)  COPD (chronic obstructive pulmonary disease)  HTN (hypertension)  Bleeding internal hemorrhoids  Pacemaker  Atrial fibrillation  Fall  TIA (transient ischemic attack): 2000  Status post Mohs surgery for basal cell carcinoma: left knee  Cardiac pacemaker  Presence of inferior vena cava filter  H/O atrioventricular eva ablation  Ptosis, both eyelids  Internal hemorrhoid: banded and cauterized  History of total knee arthroplasty, right  History of lumbar laminectomy: L3-4  History of cholecystectomy  H/O repair of right rotator cuff  Rectal adenoma: s/p excision 2005  S/P IVC filter: placed 1/9  Acute carpal tunnel syndrome: with surgery  H/O cardiac catheterization: 2005 &amp; 2007- no stents  Acquired ptosis of eyelid: 2014  S/P cataract surgery, left: June 2010  S/P cataract surgery, right: May 2010  S/P knee replacement, right: 2007  H/O laminectomy: 3/4 lumbar    2002  Complete tear of rotator cuff, right: 1998  S/P cholecystectomy: 1991  H/O total hysterectomy: 1971      adhesives (Blisters)  codeine (Other; Nausea)  Morphine Sulfate (Other; Nausea)    MEDICATIONS  (STANDING):  ascorbic acid 500 milliGRAM(s) Oral three times a day  ATENolol  Tablet 25 milliGRAM(s) Oral daily  atorvastatin 20 milliGRAM(s) Oral at bedtime  buDESOnide    Inhalation Suspension 0.5 milliGRAM(s) Inhalation daily  cholecalciferol 5000 Unit(s) Oral daily  ferrous    sulfate 325 milliGRAM(s) Oral daily  FLUoxetine 20 milliGRAM(s) Oral daily  furosemide   Injectable 40 milliGRAM(s) IV Push daily  lisinopril 2.5 milliGRAM(s) Oral daily  methylPREDNISolone sodium succinate Injectable 60 milliGRAM(s) IV Push every 8 hours  multivitamin 1 Tablet(s) Oral daily  oxybutynin XL 10 milliGRAM(s) Oral daily  pantoprazole    Tablet 40 milliGRAM(s) Oral before breakfast  piperacillin/tazobactam IVPB.. 3.375 Gram(s) IV Intermittent every 8 hours  thiamine 200 milliGRAM(s) Oral <User Schedule>    MEDICATIONS  (PRN):  albuterol/ipratropium for Nebulization 3 milliLiter(s) Nebulizer every 6 hours PRN Shortness of Breath and/or Wheezing  fluticasone propionate 50 MICROgram(s)/spray Nasal Spray 1 Spray(s) Both Nostrils two times a day PRN Nasal Congestion      Vital Signs Last 24 Hrs  T(C): 36.5 (28 Apr 2020 02:01), Max: 37.2 (27 Apr 2020 11:37)  T(F): 97.7 (28 Apr 2020 02:01), Max: 99 (27 Apr 2020 11:37)  HR: 61 (28 Apr 2020 04:42) (60 - 79)  BP: 111/63 (28 Apr 2020 04:42) (101/60 - 125/81)  BP(mean): --  RR: 18 (28 Apr 2020 02:01) (18 - 20)  SpO2: 97% (28 Apr 2020 02:01) (95% - 97%)  ICU Vital Signs Last 24 Hrs    PHYSICAL EXAM:  General: Appears well developed, well nourished alert and cooperative.  HEENT: Head; normocephalic, atraumatic.Pupils reactive, cornea wnl. Neck supple, no nodes adenopathy, no JVD  CARDIOVASCULAR: Normal S1 and S2, 1/6 LOULOU, no rub, gallop or lift.   LUNGS: No rales, rhonchi or wheeze. Normal breath sounds bilaterally.  ABDOMEN: Soft, nontender without mass or organomegaly. bowel sounds normoactive.  EXTREMITIES: No clubbing, cyanosis or edema.   SKIN: warm and dry with normal turgor.  NEURO: Alert/oriented x 3/normal motor exam.   PSYCH: normal affect.        LABS:                        13.1   40.75 )-----------( 257      ( 27 Apr 2020 07:40 )             40.6     04-27    133<L>  |  94<L>  |  40.0<H>  ----------------------------<  89  4.0   |  25.0  |  1.29    Ca    9.4      27 Apr 2020 07:40  Mg     1.9     04-27    TPro  6.5<L>  /  Alb  3.8  /  TBili  2.1<H>  /  DBili  x   /  AST  36<H>  /  ALT  29  /  AlkPhos  52  04-26    YOSELYN PAULSON  CARDIAC MARKERS ( 26 Apr 2020 23:31 )  x     / 0.02 ng/mL / x     / x     / x      CARDIAC MARKERS ( 26 Apr 2020 13:58 )  x     / 0.02 ng/mL / 84 U/L / x     / x          PT/INR - ( 28 Apr 2020 08:05 )   PT: 45.6 sec;   INR: 3.87 ratio         PTT - ( 28 Apr 2020 08:05 )  PTT:53.7 sec      RADIOLOGY & ADDITIONAL STUDIES:    ASSESSMENT AND PLAN:  In summary, YOSELYN PAULSON is a 86y Female with past medical history significant for permanent AF s/p AVN ablation, BiV pacemaker, chronic venous stasis, prior PE with IVC filter, obesity, chronic diastolic HF who presents with shortness of breath, edema and weakness consistent with acute on chronic HFpEF.     Shortness of breath  - Likely multifactorial including acute on chronic HFpEF  - continue iv lasix     HTN  - BP controlled    Dyslipidemia  - Continue statin    AF  - Rates controlled  - on coumadin    Thank you for allowing [facility] to participate in the care of this patient.  Please feel free to call with any questions or concerns. [facility], THE [facility]                                   [address] [place] [address]                                                      PHONE: [phone]                                                         FAX: [phone]  [email or web address]  ---------------------------------------------------------------------------------------------------------------------------------    Overnight events/patient complaints: shortness of breath persists however is improved     INTERPRETATION OF TELEMETRY (personally reviewed):    RADIOLOGY & ADDITIONAL STUDIES: (reviewed)  CXR was independently reviewed and demonstrated: bilateral opacities    CARDIOLOGY TESTING:(reviewed)     ECG (Independent visualization): AF with paced rhythm    ECHOCARDIOGRAM :  < from: TTE Echo Complete w/Doppler (01.26.19 @ 10:53) >  Summary:   1. Left ventricular ejection fraction, by visual estimation, is 50 to 55%.   2. Spectral Doppler shows restrictive pattern of left ventricular myocardial filling (Grade III diastolic dysfunction).   3. Mild to moderate mitral annular calcification.   4. Moderately decreased posterior mitral leaflet mobility.   5. Thickening and calcification of the anterior and posterior mitral valve leaflets.   6. Trace tricuspid regurgitation.   7. Trace pulmonic valve regurgitation.   8. Peak transaortic gradient is 17.4 mmHg, mean transaortic gradient equals 9.6 mmHg, the calculated aortic valve area equals 1.32 cm² by the continuity equation consistent with moderate aortic stenosis.    Allergies:   adhesives (Blisters)  codeine (Other; Nausea)  Morphine Sulfate (Other; Nausea)    MEDICATIONS  (STANDING):  ascorbic acid 500 milliGRAM(s) Oral three times a day  ATENolol  Tablet 25 milliGRAM(s) Oral daily  atorvastatin 20 milliGRAM(s) Oral at bedtime  buDESOnide    Inhalation Suspension 0.5 milliGRAM(s) Inhalation daily  cholecalciferol 5000 Unit(s) Oral daily  ferrous    sulfate 325 milliGRAM(s) Oral daily  FLUoxetine 20 milliGRAM(s) Oral daily  furosemide   Injectable 40 milliGRAM(s) IV Push daily  lisinopril 2.5 milliGRAM(s) Oral daily  methylPREDNISolone sodium succinate Injectable 60 milliGRAM(s) IV Push every 8 hours  multivitamin 1 Tablet(s) Oral daily  oxybutynin XL 10 milliGRAM(s) Oral daily  pantoprazole    Tablet 40 milliGRAM(s) Oral before breakfast  piperacillin/tazobactam IVPB.. 3.375 Gram(s) IV Intermittent every 8 hours  thiamine 200 milliGRAM(s) Oral <User Schedule>    MEDICATIONS  (PRN):  albuterol/ipratropium for Nebulization 3 milliLiter(s) Nebulizer every 6 hours PRN Shortness of Breath and/or Wheezing  fluticasone propionate 50 MICROgram(s)/spray Nasal Spray 1 Spray(s) Both Nostrils two times a day PRN Nasal Congestion      Vital Signs Last 24 Hrs  T(C): 36.5 (28 Apr 2020 02:01), Max: 37.2 (27 Apr 2020 11:37)  T(F): 97.7 (28 Apr 2020 02:01), Max: 99 (27 Apr 2020 11:37)  HR: 61 (28 Apr 2020 04:42) (60 - 79)  BP: 111/63 (28 Apr 2020 04:42) (101/60 - 125/81)  BP(mean): --  RR: 18 (28 Apr 2020 02:01) (18 - 20)  SpO2: 97% (28 Apr 2020 02:01) (95% - 97%)  ICU Vital Signs Last 24 Hrs    PHYSICAL EXAM:  General: Appears well developed, well nourished alert and cooperative.  HEENT: Head; normocephalic, atraumatic.Pupils reactive, cornea wnl. Neck supple, no nodes adenopathy, no JVD  CARDIOVASCULAR: Normal S1 and S2, 1/6 LOULOU, no rub, gallop or lift.   LUNGS: (+) rales and expiratory wheezing   ABDOMEN: Soft, nontender without mass or organomegaly. bowel sounds normoactive.  EXTREMITIES: No clubbing, cyanosis or edema.   SKIN: warm and dry with normal turgor.  NEURO: Alert/oriented x 3/normal motor exam.   PSYCH: normal affect.        LABS:                        13.1   40.75 )-----------( 257      ( 27 Apr 2020 07:40 )             40.6     04-27    133<L>  |  94<L>  |  40.0<H>  ----------------------------<  89  4.0   |  25.0  |  1.29    Ca    9.4      27 Apr 2020 07:40  Mg     1.9     04-27    TPro  6.5<L>  /  Alb  3.8  /  TBili  2.1<H>  /  DBili  x   /  AST  36<H>  /  ALT  29  /  AlkPhos  52  04-26    YOSELYN PAULSON  CARDIAC MARKERS ( 26 Apr 2020 23:31 )  x     / 0.02 ng/mL / x     / x     / x      CARDIAC MARKERS ( 26 Apr 2020 13:58 )  x     / 0.02 ng/mL / 84 U/L / x     / x          PT/INR - ( 28 Apr 2020 08:05 )   PT: 45.6 sec;   INR: 3.87 ratio         PTT - ( 28 Apr 2020 08:05 )  PTT:53.7 sec      RADIOLOGY & ADDITIONAL STUDIES:    ASSESSMENT AND PLAN:  In summary, YOSELYN PAULSON is a morbidly obese 86y with permanent atrial fibrillation s/p ablation and BiV pacemaker, 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 Additionally found to have PNA. COVID-19 PCR negative from 4/26    Shortness of breath  - Likely multifactorial including acute on chronic HFpEF (low normal LVEF with grade III diastolic dysfunction with elevated LAP), RUL PNA and COPD   - continue IV lasix, antibiotic therapy, and bronchodilator therapy  - O2 support and wean as tolerated   - strict I/O and agree with fluid restriction, may liberate to 1.5L  - goal K>4 and Mg>2    HTN/Dyslipidemia  - BP controlled: continue present regimen   - Continue statin    Chronic AF  - Rates controlled  - on coumadin, daily INR   - continue telemetry monitoring     Thank you for allowing [facility] to participate in the care of this patient.  Please feel free to call with any questions or concerns.

## 2020-04-28 NOTE — PROGRESS NOTE ADULT - SUBJECTIVE AND OBJECTIVE BOX
YOSELYN PAULSON  86y, Female    Complaints: Acute on chronic hypoxemic respiratory failure due to PNA and decompensated CHF  Subjective: Pt seen and examined at bedside. Pts lungs sounds improving. Pt complains that she continues to be tired. Blood cultures should result today. No chest pain, no nausea, or vomiting. ROS otherwise negative     Vital Signs Last 24 Hrs  T(C): 36.7 (28 Apr 2020 09:01), Max: 36.8 (27 Apr 2020 16:08)  T(F): 98 (28 Apr 2020 09:01), Max: 98.3 (27 Apr 2020 16:08)  HR: 63 (28 Apr 2020 09:01) (60 - 63)  BP: 117/65 (28 Apr 2020 09:01) (101/60 - 117/65)  RR: 20 (28 Apr 2020 09:01) (18 - 20)  SpO2: 98% (28 Apr 2020 09:01) (95% - 98%)    LABS:                        13.6   23.38 )-----------( 265      ( 28 Apr 2020 08:05 )             42.4   04-28    135  |  95<L>  |  46.0<H>  ----------------------------<  130<H>  3.7   |  28.0  |  1.19    Ca    9.3      28 Apr 2020 08:04  Phos  3.5     04-28  Mg     2.1     04-28    TPro  6.5<L>  /  Alb  3.8  /  TBili  2.1<H>  /  DBili  x   /  AST  36<H>  /  ALT  29  /  AlkPhos  52  04-26    MedsMEDICATIONS  (STANDING):  ascorbic acid 500 milliGRAM(s) Oral three times a day  ATENolol  Tablet 25 milliGRAM(s) Oral daily  atorvastatin 20 milliGRAM(s) Oral at bedtime  buDESOnide    Inhalation Suspension 0.5 milliGRAM(s) Inhalation daily  cholecalciferol 5000 Unit(s) Oral daily  ferrous    sulfate 325 milliGRAM(s) Oral daily  FLUoxetine 20 milliGRAM(s) Oral daily  furosemide   Injectable 40 milliGRAM(s) IV Push daily  lisinopril 2.5 milliGRAM(s) Oral daily  methylPREDNISolone sodium succinate Injectable 60 milliGRAM(s) IV Push every 8 hours  multivitamin 1 Tablet(s) Oral daily  oxybutynin XL 10 milliGRAM(s) Oral daily  pantoprazole    Tablet 40 milliGRAM(s) Oral before breakfast  piperacillin/tazobactam IVPB.. 3.375 Gram(s) IV Intermittent every 8 hours  thiamine 200 milliGRAM(s) Oral <User Schedule>    MEDICATIONS  (PRN):  albuterol/ipratropium for Nebulization 3 milliLiter(s) Nebulizer every 6 hours PRN Shortness of Breath and/or Wheezing  fluticasone propionate 50 MICROgram(s)/spray Nasal Spray 1 Spray(s) Both Nostrils two times a day PRN Nasal Congestion      HEALTH ISSUES - PROBLEM Dx:

## 2020-04-28 NOTE — PROGRESS NOTE ADULT - ASSESSMENT
86yoF extensive PMhx including COPD on 2L home O2, HFpEF (50-55%) Afib s/p ablation and PPM, on coumadin, prior TIA, LLE DVT and PE (2014) s/p IVC filter, JUAN non-compliant with CPAP who initially presented to ED with right sided chest pain, dyspnea found to have RUL PNA, then was d/c'ed to home and returned to ED the same day with worsening symptoms and increased O2 requirements, being admitted for acute on chronic hypoxemic respiratory failure due to PNA and decompensated CHF    Acute on chronic hypoxemic respiratory failure due to PNA and decompensated CHF  -c/w with monitor and    -C/W with azithro and rocephin   -IV Lasix 40mg for diuresis   -Increased O2 requirements from 2 to 4L   -Wean O2 back to baseline requirements    Acute decompensation CHF in setting of medication non-compliance  -Pt missed 2 days of PO Lasix prior to admission  -TTE in 1/26/2019 showed grade 3 diastolic dysfunction with EF of 50-55%  -Monitored bed  -c/w with IV lasix 40 daily   -1L fluid restriction   -Cardiology eval (Mora consulted)    Hypomagnesemia   -Admission Mg 1.4, repleted in ED   -Monitor BMP and replete accordingly     Afib  -On coumadin, admission INR slightly supratherapeutic at 3.58  -Atenolol resumed    HTN  -On atenolol and lisinopril    COPD exacerbation  - Pt has been wheezing since admission  - Solumedrol 60 mg q 8. Will deescalate as pts symptoms improve     Depression  -Fluoxetine resumed    Overactive bladder  -Oxybutynin resumed    Prophylactic measure  -On coumadin but currently being held, as pts INR is supratherapuetic.     DISPO: Pt is DNR/DNI. Molst form signed and in chart. 86yoF extensive PMhx including COPD on 2L home O2, HFpEF (50-55%) Afib s/p ablation and PPM, on coumadin, prior TIA, LLE DVT and PE (2014) s/p IVC filter, JUAN non-compliant with CPAP who initially presented to ED with right sided chest pain, dyspnea found to have RUL PNA, then was d/c'ed to home and returned to ED the same day with worsening symptoms and increased O2 requirements, being admitted for acute on chronic hypoxemic respiratory failure due to PNA and decompensated CHF    Acute on chronic hypoxemic respiratory failure due to RUL PNA and decompensated CHF  -c/w with monitor and    -s/p 2 doses of azithro and rocephin. Concerns for aspiration, switched to Zosyn   -IV Lasix 40mg for diuresis   -Increased O2 requirements from 2 to 4L   -Wean O2 back to baseline requirements    Acute decompensation CHF in setting of medication non-compliance  -Pt missed 2 days of PO Lasix prior to admission  -TTE in 1/26/2019 showed grade 3 diastolic dysfunction with EF of 50-55%  -Monitored bed  -c/w with IV lasix 40 daily   -1L fluid restriction   -Cardiology eval ([facility] consulted)    Hypomagnesemia   -Admission Mg 1.4, repleted in ED   -Monitor BMP and replete accordingly     Afib  -On coumadin, admission INR slightly supratherapeutic at 3.58  -Atenolol resumed    HTN  -On atenolol and lisinopril    COPD exacerbation  - Pt has been wheezing since admission  - Solumedrol 60 mg q 8. Will deescalate as pts symptoms improve     Depression  -Fluoxetine resumed    Overactive bladder  -Oxybutynin resumed    Prophylactic measure  -On coumadin but currently being held, as pts INR is supratherapuetic.     DISPO: Pt is DNR/DNI. Molst form signed and in chart.

## 2020-04-29 LAB
BASOPHILS # BLD AUTO: 0.04 K/UL — SIGNIFICANT CHANGE UP (ref 0–0.2)
BASOPHILS NFR BLD AUTO: 0.4 % — SIGNIFICANT CHANGE UP (ref 0–2)
EOSINOPHIL # BLD AUTO: 0.14 K/UL — SIGNIFICANT CHANGE UP (ref 0–0.5)
EOSINOPHIL NFR BLD AUTO: 1.3 % — SIGNIFICANT CHANGE UP (ref 0–6)
HCT VFR BLD CALC: 44.9 % — SIGNIFICANT CHANGE UP (ref 34.5–45)
HGB BLD-MCNC: 14.5 G/DL — SIGNIFICANT CHANGE UP (ref 11.5–15.5)
IMM GRANULOCYTES NFR BLD AUTO: 1.1 % — SIGNIFICANT CHANGE UP (ref 0–1.5)
INR BLD: 3.89 RATIO — HIGH (ref 0.88–1.16)
LYMPHOCYTES # BLD AUTO: 0.53 K/UL — LOW (ref 1–3.3)
LYMPHOCYTES # BLD AUTO: 4.9 % — LOW (ref 13–44)
MCHC RBC-ENTMCNC: 31 PG — SIGNIFICANT CHANGE UP (ref 27–34)
MCHC RBC-ENTMCNC: 32.3 GM/DL — SIGNIFICANT CHANGE UP (ref 32–36)
MCV RBC AUTO: 95.9 FL — SIGNIFICANT CHANGE UP (ref 80–100)
MONOCYTES # BLD AUTO: 0.08 K/UL — SIGNIFICANT CHANGE UP (ref 0–0.9)
MONOCYTES NFR BLD AUTO: 0.7 % — LOW (ref 2–14)
NEUTROPHILS # BLD AUTO: 9.85 K/UL — HIGH (ref 1.8–7.4)
NEUTROPHILS NFR BLD AUTO: 91.6 % — HIGH (ref 43–77)
PLATELET # BLD AUTO: 184 K/UL — SIGNIFICANT CHANGE UP (ref 150–400)
PROTHROM AB SERPL-ACNC: 45.9 SEC — HIGH (ref 10–12.9)
RBC # BLD: 4.68 M/UL — SIGNIFICANT CHANGE UP (ref 3.8–5.2)
RBC # FLD: 14.9 % — HIGH (ref 10.3–14.5)
WBC # BLD: 10.76 K/UL — HIGH (ref 3.8–10.5)
WBC # FLD AUTO: 10.76 K/UL — HIGH (ref 3.8–10.5)

## 2020-04-29 PROCEDURE — 99233 SBSQ HOSP IP/OBS HIGH 50: CPT

## 2020-04-29 RX ORDER — FUROSEMIDE 40 MG
40 TABLET ORAL DAILY
Refills: 0 | Status: DISCONTINUED | OUTPATIENT
Start: 2020-04-29 | End: 2020-05-04

## 2020-04-29 RX ADMIN — PIPERACILLIN AND TAZOBACTAM 25 GRAM(S): 4; .5 INJECTION, POWDER, LYOPHILIZED, FOR SOLUTION INTRAVENOUS at 16:12

## 2020-04-29 RX ADMIN — Medication 500 MILLIGRAM(S): at 08:28

## 2020-04-29 RX ADMIN — Medication 20 MILLIGRAM(S): at 08:28

## 2020-04-29 RX ADMIN — ATORVASTATIN CALCIUM 20 MILLIGRAM(S): 80 TABLET, FILM COATED ORAL at 22:34

## 2020-04-29 RX ADMIN — Medication 500 MILLIGRAM(S): at 05:36

## 2020-04-29 RX ADMIN — Medication 60 MILLIGRAM(S): at 00:54

## 2020-04-29 RX ADMIN — Medication 500 MILLIGRAM(S): at 22:34

## 2020-04-29 RX ADMIN — PIPERACILLIN AND TAZOBACTAM 25 GRAM(S): 4; .5 INJECTION, POWDER, LYOPHILIZED, FOR SOLUTION INTRAVENOUS at 05:31

## 2020-04-29 RX ADMIN — Medication 200 MILLIGRAM(S): at 16:12

## 2020-04-29 RX ADMIN — Medication 500 MILLIGRAM(S): at 00:55

## 2020-04-29 RX ADMIN — Medication 5000 UNIT(S): at 08:28

## 2020-04-29 RX ADMIN — ATORVASTATIN CALCIUM 20 MILLIGRAM(S): 80 TABLET, FILM COATED ORAL at 00:54

## 2020-04-29 RX ADMIN — Medication 200 MILLIGRAM(S): at 08:29

## 2020-04-29 RX ADMIN — Medication 10 MILLIGRAM(S): at 08:28

## 2020-04-29 RX ADMIN — Medication 325 MILLIGRAM(S): at 08:28

## 2020-04-29 RX ADMIN — Medication 1 TABLET(S): at 08:28

## 2020-04-29 RX ADMIN — PANTOPRAZOLE SODIUM 40 MILLIGRAM(S): 20 TABLET, DELAYED RELEASE ORAL at 08:29

## 2020-04-29 RX ADMIN — PIPERACILLIN AND TAZOBACTAM 25 GRAM(S): 4; .5 INJECTION, POWDER, LYOPHILIZED, FOR SOLUTION INTRAVENOUS at 22:34

## 2020-04-29 RX ADMIN — Medication 60 MILLIGRAM(S): at 05:43

## 2020-04-29 NOTE — PROGRESS NOTE ADULT - SUBJECTIVE AND OBJECTIVE BOX
[facility], THE HEART CENTER                                   [address] [place] [address]                                                      PHONE: [phone]                                                         FAX: [phone]  [email or web address]  ----------------------------------------------------------------------------------------------------------------------------    Reason for follow-up: acute decompensated heart failure     Overnight events/patient complaints: shortness of breath persists however is improved     INTERPRETATION OF TELEMETRY (personally reviewed):    RADIOLOGY & ADDITIONAL STUDIES: (reviewed)  CXR was independently reviewed and demonstrated: bilateral opacities    CARDIOLOGY TESTING:(reviewed)     ECG (Independent visualization): AF with paced rhythm    ECHOCARDIOGRAM :  < from: TTE Echo Complete w/Doppler (01.26.19 @ 10:53) >   1. Left ventricular ejection fraction, by visual estimation, is 50 to 55%.   2. Spectral Doppler shows restrictive pattern of left ventricular myocardial filling (Grade III diastolic dysfunction).   3. Mild to moderate mitral annular calcification.   4. Moderately decreased posterior mitral leaflet mobility.   5. Thickening and calcification of the anterior and posterior mitral valve leaflets.   6. Trace tricuspid regurgitation.   7. Trace pulmonic valve regurgitation.   8. Peak transaortic gradient is 17.4 mmHg, mean transaortic gradient equals 9.6 mmHg, the calculated aortic valve area equals 1.32 cm² by the continuity equation consistent with moderate aortic stenosis.    MEDICATIONS  (STANDING):  ascorbic acid 500 milliGRAM(s) Oral three times a day  ATENolol  Tablet 25 milliGRAM(s) Oral daily  atorvastatin 20 milliGRAM(s) Oral at bedtime  buDESOnide    Inhalation Suspension 0.5 milliGRAM(s) Inhalation daily  cholecalciferol 5000 Unit(s) Oral daily  ferrous    sulfate 325 milliGRAM(s) Oral daily  FLUoxetine 20 milliGRAM(s) Oral daily  furosemide   Injectable 40 milliGRAM(s) IV Push daily  lisinopril 2.5 milliGRAM(s) Oral daily  methylPREDNISolone sodium succinate Injectable 60 milliGRAM(s) IV Push every 12 hours  multivitamin 1 Tablet(s) Oral daily  oxybutynin XL 10 milliGRAM(s) Oral daily  pantoprazole    Tablet 40 milliGRAM(s) Oral before breakfast  piperacillin/tazobactam IVPB.. 3.375 Gram(s) IV Intermittent every 8 hours  thiamine 200 milliGRAM(s) Oral <User Schedule>    Vital Signs Last 24 Hrs  T(C): 36.4 (29 Apr 2020 07:55), Max: 36.4 (28 Apr 2020 15:51)  T(F): 97.5 (29 Apr 2020 07:55), Max: 97.6 (28 Apr 2020 15:51)  HR: 60 (29 Apr 2020 07:55) (59 - 63)  BP: 135/67 (29 Apr 2020 07:55) (90/50 - 135/67  RR: 20 (29 Apr 2020 07:55) (16 - 20)  SpO2: 98% (29 Apr 2020 07:55) (96% - 98%)  ICU Vital Signs Last 24 Hrs    PHYSICAL EXAM:  General: obese, chronically ill appearing   HEENT: neck is plethoric   CARDIOVASCULAR: Normal S1 and S2, 2/6 LOULOU, no rub, gallop or lift.   LUNGS: No rales, rhonchi or wheeze. Normal breath sounds bilaterally.  ABDOMEN: Soft, nontender without mass or organomegaly. bowel sounds normoactive.  EXTREMITIES: No clubbing, cyanosis, (+) LE edema.   SKIN: warm and dry with normal turgor.  NEURO: Alert/oriented x 3/normal motor exam.   PSYCH: normal affect.        LABS:                        13.6   23.38 )-----------( 265      ( 28 Apr 2020 08:05 )             42.4     04-28    135  |  95<L>  |  46.0<H>  ----------------------------<  130<H>  3.7   |  28.0  |  1.19    Ca    9.3      28 Apr 2020 08:04  Phos  3.5     04-28  Mg     2.1     04-28      YOSELYN PAULSON      PT/INR - ( 28 Apr 2020 08:05 )   PT: 45.6 sec;   INR: 3.87 ratio         PTT - ( 28 Apr 2020 08:05 )  PTT:53.7 sec      RADIOLOGY & ADDITIONAL STUDIES:    ASSESSMENT AND PLAN:  In summary, YOSELYN PAULSON is a morbidly obese 86y with permanent atrial fibrillation s/p ablation and BiV pacemaker, 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 Additionally found to have PNA. COVID-19 PCR negative from 4/26    Shortness of breath  - Likely multifactorial including acute on chronic HFpEF (low normal LVEF with grade III diastolic dysfunction with elevated LAP), RUL PNA and COPD   - continue IV lasix, antibiotic therapy, and bronchodilator therapy, close monitoring of renal indices   - O2 support and wean as tolerated   - strict I/O and agree with fluid restriction, may liberate to 1.5L  - goal K>4 and Mg>2    HTN/Dyslipidemia  - BP controlled: continue present regimen   - Continue statin    Chronic AF  - Rates controlled  - on coumadin, daily INR   - continue telemetry monitoring     Thank you for allowing [facility] to participate in the care of this patient.  Please feel free to call with any questions or concerns. [facility], THE HEART CENTER                                   [address] [place] [address]                                                      PHONE: [phone]                                                         FAX: [phone]  [email or web address]  ----------------------------------------------------------------------------------------------------------------------------    Reason for follow-up: acute decompensated heart failure     Overnight events/patient complaints: shortness of breath is improved     INTERPRETATION OF TELEMETRY (personally reviewed):    RADIOLOGY & ADDITIONAL STUDIES: (reviewed)  CXR was independently reviewed and demonstrated: bilateral opacities    CARDIOLOGY TESTING:(reviewed)     ECG (Independent visualization): AF with paced rhythm    ECHOCARDIOGRAM :  < from: TTE Echo Complete w/Doppler (01.26.19 @ 10:53) >   1. Left ventricular ejection fraction, by visual estimation, is 50 to 55%.   2. Spectral Doppler shows restrictive pattern of left ventricular myocardial filling (Grade III diastolic dysfunction).   3. Mild to moderate mitral annular calcification.   4. Moderately decreased posterior mitral leaflet mobility.   5. Thickening and calcification of the anterior and posterior mitral valve leaflets.   6. Trace tricuspid regurgitation.   7. Trace pulmonic valve regurgitation.   8. Peak transaortic gradient is 17.4 mmHg, mean transaortic gradient equals 9.6 mmHg, the calculated aortic valve area equals 1.32 cm² by the continuity equation consistent with moderate aortic stenosis.    MEDICATIONS  (STANDING):  ascorbic acid 500 milliGRAM(s) Oral three times a day  ATENolol  Tablet 25 milliGRAM(s) Oral daily  atorvastatin 20 milliGRAM(s) Oral at bedtime  buDESOnide    Inhalation Suspension 0.5 milliGRAM(s) Inhalation daily  cholecalciferol 5000 Unit(s) Oral daily  ferrous    sulfate 325 milliGRAM(s) Oral daily  FLUoxetine 20 milliGRAM(s) Oral daily  furosemide   Injectable 40 milliGRAM(s) IV Push daily  lisinopril 2.5 milliGRAM(s) Oral daily  methylPREDNISolone sodium succinate Injectable 60 milliGRAM(s) IV Push every 12 hours  multivitamin 1 Tablet(s) Oral daily  oxybutynin XL 10 milliGRAM(s) Oral daily  pantoprazole    Tablet 40 milliGRAM(s) Oral before breakfast  piperacillin/tazobactam IVPB.. 3.375 Gram(s) IV Intermittent every 8 hours  thiamine 200 milliGRAM(s) Oral <User Schedule>    Vital Signs Last 24 Hrs  T(C): 36.4 (29 Apr 2020 07:55), Max: 36.4 (28 Apr 2020 15:51)  T(F): 97.5 (29 Apr 2020 07:55), Max: 97.6 (28 Apr 2020 15:51)  HR: 60 (29 Apr 2020 07:55) (59 - 63)  BP: 135/67 (29 Apr 2020 07:55) (90/50 - 135/67  RR: 20 (29 Apr 2020 07:55) (16 - 20)  SpO2: 98% (29 Apr 2020 07:55) (96% - 98%)  ICU Vital Signs Last 24 Hrs    PHYSICAL EXAM:  General: obese, chronically ill appearing   HEENT: neck is plethoric   CARDIOVASCULAR: Normal S1 and S2, 2/6 LOULOU, no rub, gallop or lift.   LUNGS: No rales, rhonchi or wheeze. Normal breath sounds bilaterally.  ABDOMEN: Soft, nontender without mass or organomegaly. bowel sounds normoactive.  EXTREMITIES: No clubbing, cyanosis, (trivial) LE edema.   SKIN: warm and dry with normal turgor.  NEURO: Alert/oriented x 3/normal motor exam.   PSYCH: normal affect.        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 Additionally found to have PNA. COVID-19 PCR negative from 4/26    Shortness of breath  - Likely multifactorial including acute on chronic HFpEF (low normal LVEF with grade III diastolic dysfunction with elevated LAP), RUL PNA and COPD   - continue IV lasix, transition to oral tomorrow, continue antibiotic therapy, and bronchodilator therapy, close monitoring of renal indices   - O2 support and wean as tolerated   - strict I/O and agree with fluid restriction, may liberate to 1.5L  - goal K>4 and Mg>2    HTN/Dyslipidemia  - BP controlled: continue present regimen   - Continue statin    Chronic AF  - Rates controlled  - on coumadin, daily INR   - continue telemetry monitoring     Thank you for allowing [facility] to participate in the care of this patient.  Please feel free to call with any questions or concerns.

## 2020-04-29 NOTE — PROGRESS NOTE ADULT - ASSESSMENT
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 Pt initially placed on IV lasix, and steroids, showing much improvement. PT eval ordered. Overall clinically improved.     Acute on chronic hypoxemic respiratory failure due to RUL PNA and decompensated CHF  - Pt shows much clinical improvement.   -c/w with monitor and    -s/p 2 doses of azithro and rocephin. Concerns for aspiration, switched to Zosyn   -IV Lasix 40mg for diuresis deescalated to 40mg daily   -Increased O2 requirements from 2 to 4L   -Wean O2 back to baseline requirements    Acute decompensation CHF in setting of medication non-compliance  -Pt missed 2 days of PO Lasix prior to admission  -TTE showed EF of 45-55 % with reduced RV function and moderate Aortic stenosis.   -lasix switched to PO.   -1L fluid restriction   -Cardiology eval ([place] consulted)    Hypomagnesemia   -Admission Mg 1.4, repleted in ED   -Monitor BMP and replete accordingly     Afib  -On coumadin, admission INR slightly supratherapeutic at 3.58. Pending repeat   -Atenolol resumed    HTN  -On atenolol and lisinopril    COPD exacerbation  - Pt has been wheezing since admission  - Solumedrol 60 mg q 8. deescalated to 40 mg daily     Depression  -Fluoxetine resumed    Overactive bladder  -Oxybutynin resumed    Prophylactic measure  -On coumadin but currently being held, as pts INR is supra therapuetic.     DISPO: Pt is DNR/DNI. Molst form signed and in chart.

## 2020-04-29 NOTE — PROGRESS NOTE ADULT - SUBJECTIVE AND OBJECTIVE BOX
YOSELYN PAULSON  86y, Female    Complaints: Acute on chronic hypoxemic respiratory failure due to PNA and decompensated CHF  Subjective: Pt seen and examined at bedside. Pt has shown much clinical improvement. Lasix and steroids deescalated to oral. Will monitor for 24 hours and begin dispo planning. Pending PT eval. No chest pain, no shortness of breath, no palpitations. ROS otherwise negative.     Vital Signs Last 24 Hrs  T(C): 36.6 (29 Apr 2020 11:37), Max: 36.6 (29 Apr 2020 11:37)  T(F): 97.8 (29 Apr 2020 11:37), Max: 97.8 (29 Apr 2020 11:37)  HR: 63 (29 Apr 2020 11:37) (59 - 63)  BP: 118/71 (29 Apr 2020 11:37) (90/50 - 135/67)  RR: 20 (29 Apr 2020 11:37) (16 - 20)  SpO2: 99% (29 Apr 2020 11:37) (96% - 99%)    PHYSICAL EXAM:  General: obese, NAD  HEENT: neck is plethoric   CARDIOVASCULAR: Normal S1 and S2, 2/6 LOULOU,   EXT:B/L lower extremities, much improved.   NEURO: Alert/oriented x 3/normal motor exam.   PSYCH: normal affect.  LABS:                 14.5   10.76 )-----------( 184      ( 29 Apr 2020 11:59 )             44.9     04-28    135  |  95<L>  |  46.0<H>  ----------------------------<  130<H>  3.7   |  28.0  |  1.19    Ca    9.3      28 Apr 2020 08:04  Phos  3.5     04-28  Mg     2.1     04-28        MedsMEDICATIONS  (STANDING):  ascorbic acid 500 milliGRAM(s) Oral three times a day  ATENolol  Tablet 25 milliGRAM(s) Oral daily  atorvastatin 20 milliGRAM(s) Oral at bedtime  buDESOnide    Inhalation Suspension 0.5 milliGRAM(s) Inhalation daily  cholecalciferol 5000 Unit(s) Oral daily  ferrous    sulfate 325 milliGRAM(s) Oral daily  FLUoxetine 20 milliGRAM(s) Oral daily  furosemide    Tablet 40 milliGRAM(s) Oral daily  lisinopril 2.5 milliGRAM(s) Oral daily  multivitamin 1 Tablet(s) Oral daily  oxybutynin XL 10 milliGRAM(s) Oral daily  pantoprazole    Tablet 40 milliGRAM(s) Oral before breakfast  piperacillin/tazobactam IVPB.. 3.375 Gram(s) IV Intermittent every 8 hours  predniSONE   Tablet 40 milliGRAM(s) Oral daily  thiamine 200 milliGRAM(s) Oral <User Schedule>    MEDICATIONS  (PRN):  albuterol/ipratropium for Nebulization 3 milliLiter(s) Nebulizer every 6 hours PRN Shortness of Breath and/or Wheezing  fluticasone propionate 50 MICROgram(s)/spray Nasal Spray 1 Spray(s) Both Nostrils two times a day PRN Nasal Congestion      HEALTH ISSUES - PROBLEM Dx: YOSELYN PAULSON  86y, Female    Complaints: Acute on chronic hypoxemic respiratory failure due to PNA and decompensated CHF  Subjective: Pt seen and examined at bedside. Pt has shown much clinical improvement. Lasix and steroids deescalated to oral. Will monitor for 24 hours and begin dispo planning. Pending PT eval. No chest pain, no shortness of breath, no palpitations. ROS otherwise negative.     Vital Signs Last 24 Hrs  T(C): 36.6 (29 Apr 2020 11:37), Max: 36.6 (29 Apr 2020 11:37)  T(F): 97.8 (29 Apr 2020 11:37), Max: 97.8 (29 Apr 2020 11:37)  HR: 63 (29 Apr 2020 11:37) (59 - 63)  BP: 118/71 (29 Apr 2020 11:37) (90/50 - 135/67)  RR: 20 (29 Apr 2020 11:37) (16 - 20)  SpO2: 99% (29 Apr 2020 11:37) (96% - 99%)    PHYSICAL EXAM:  General: obese, NAD calm and engaging   HEENT: neck is plethoric   CARDIOVASCULAR: Normal S1 and S2, 2/6 LOULOU,   EXT:B/L lower extremities, much improved.   NEURO: Alert/oriented x 3/normal motor exam.   PSYCH: normal affect.  LABS:                 14.5   10.76 )-----------( 184      ( 29 Apr 2020 11:59 )             44.9     04-28    135  |  95<L>  |  46.0<H>  ----------------------------<  130<H>  3.7   |  28.0  |  1.19    Ca    9.3      28 Apr 2020 08:04  Phos  3.5     04-28  Mg     2.1     04-28        MedsMEDICATIONS  (STANDING):  ascorbic acid 500 milliGRAM(s) Oral three times a day  ATENolol  Tablet 25 milliGRAM(s) Oral daily  atorvastatin 20 milliGRAM(s) Oral at bedtime  buDESOnide    Inhalation Suspension 0.5 milliGRAM(s) Inhalation daily  cholecalciferol 5000 Unit(s) Oral daily  ferrous    sulfate 325 milliGRAM(s) Oral daily  FLUoxetine 20 milliGRAM(s) Oral daily  furosemide    Tablet 40 milliGRAM(s) Oral daily  lisinopril 2.5 milliGRAM(s) Oral daily  multivitamin 1 Tablet(s) Oral daily  oxybutynin XL 10 milliGRAM(s) Oral daily  pantoprazole    Tablet 40 milliGRAM(s) Oral before breakfast  piperacillin/tazobactam IVPB.. 3.375 Gram(s) IV Intermittent every 8 hours  predniSONE   Tablet 40 milliGRAM(s) Oral daily  thiamine 200 milliGRAM(s) Oral <User Schedule>    MEDICATIONS  (PRN):  albuterol/ipratropium for Nebulization 3 milliLiter(s) Nebulizer every 6 hours PRN Shortness of Breath and/or Wheezing  fluticasone propionate 50 MICROgram(s)/spray Nasal Spray 1 Spray(s) Both Nostrils two times a day PRN Nasal Congestion      HEALTH ISSUES - PROBLEM Dx:

## 2020-04-30 LAB
ANION GAP SERPL CALC-SCNC: 12 MMOL/L — SIGNIFICANT CHANGE UP (ref 5–17)
ANISOCYTOSIS BLD QL: SIGNIFICANT CHANGE UP
BASOPHILS # BLD AUTO: 0 K/UL — SIGNIFICANT CHANGE UP (ref 0–0.2)
BASOPHILS NFR BLD AUTO: 0 % — SIGNIFICANT CHANGE UP (ref 0–2)
BUN SERPL-MCNC: 60 MG/DL — HIGH (ref 8–20)
CALCIUM SERPL-MCNC: 9.5 MG/DL — SIGNIFICANT CHANGE UP (ref 8.6–10.2)
CHLORIDE SERPL-SCNC: 97 MMOL/L — LOW (ref 98–107)
CO2 SERPL-SCNC: 30 MMOL/L — HIGH (ref 22–29)
CREAT SERPL-MCNC: 1.06 MG/DL — SIGNIFICANT CHANGE UP (ref 0.5–1.3)
EOSINOPHIL # BLD AUTO: 0 K/UL — SIGNIFICANT CHANGE UP (ref 0–0.5)
EOSINOPHIL NFR BLD AUTO: 0 % — SIGNIFICANT CHANGE UP (ref 0–6)
GIANT PLATELETS BLD QL SMEAR: PRESENT — SIGNIFICANT CHANGE UP
GLUCOSE SERPL-MCNC: 123 MG/DL — HIGH (ref 70–99)
HCT VFR BLD CALC: 46.1 % — HIGH (ref 34.5–45)
HGB BLD-MCNC: 14.8 G/DL — SIGNIFICANT CHANGE UP (ref 11.5–15.5)
INR BLD: 4.4 RATIO — HIGH (ref 0.88–1.16)
LYMPHOCYTES # BLD AUTO: 0.64 K/UL — LOW (ref 1–3.3)
LYMPHOCYTES # BLD AUTO: 6.1 % — LOW (ref 13–44)
MACROCYTES BLD QL: SIGNIFICANT CHANGE UP
MAGNESIUM SERPL-MCNC: 2.3 MG/DL — SIGNIFICANT CHANGE UP (ref 1.8–2.6)
MANUAL SMEAR VERIFICATION: SIGNIFICANT CHANGE UP
MCHC RBC-ENTMCNC: 31 PG — SIGNIFICANT CHANGE UP (ref 27–34)
MCHC RBC-ENTMCNC: 32.1 GM/DL — SIGNIFICANT CHANGE UP (ref 32–36)
MCV RBC AUTO: 96.4 FL — SIGNIFICANT CHANGE UP (ref 80–100)
MONOCYTES # BLD AUTO: 0.19 K/UL — SIGNIFICANT CHANGE UP (ref 0–0.9)
MONOCYTES NFR BLD AUTO: 1.8 % — LOW (ref 2–14)
MYELOCYTES NFR BLD: 0.9 % — HIGH (ref 0–0)
NEUTROPHILS # BLD AUTO: 9.32 K/UL — HIGH (ref 1.8–7.4)
NEUTROPHILS NFR BLD AUTO: 87.8 % — HIGH (ref 43–77)
NEUTS BAND # BLD: 1.7 % — SIGNIFICANT CHANGE UP (ref 0–8)
PHOSPHATE SERPL-MCNC: 3.7 MG/DL — SIGNIFICANT CHANGE UP (ref 2.4–4.7)
PLAT MORPH BLD: NORMAL — SIGNIFICANT CHANGE UP
PLATELET # BLD AUTO: 342 K/UL — SIGNIFICANT CHANGE UP (ref 150–400)
POTASSIUM SERPL-MCNC: 4.2 MMOL/L — SIGNIFICANT CHANGE UP (ref 3.5–5.3)
POTASSIUM SERPL-SCNC: 4.2 MMOL/L — SIGNIFICANT CHANGE UP (ref 3.5–5.3)
PROMYELOCYTES # FLD: 1.7 % — HIGH (ref 0–0)
PROTHROM AB SERPL-ACNC: 52.1 SEC — HIGH (ref 10–12.9)
RBC # BLD: 4.78 M/UL — SIGNIFICANT CHANGE UP (ref 3.8–5.2)
RBC # FLD: 14.6 % — HIGH (ref 10.3–14.5)
RBC BLD AUTO: ABNORMAL
SODIUM SERPL-SCNC: 139 MMOL/L — SIGNIFICANT CHANGE UP (ref 135–145)
WBC # BLD: 10.41 K/UL — SIGNIFICANT CHANGE UP (ref 3.8–10.5)
WBC # FLD AUTO: 10.41 K/UL — SIGNIFICANT CHANGE UP (ref 3.8–10.5)

## 2020-04-30 PROCEDURE — 99233 SBSQ HOSP IP/OBS HIGH 50: CPT

## 2020-04-30 RX ORDER — PHYTONADIONE (VIT K1) 5 MG
5 TABLET ORAL ONCE
Refills: 0 | Status: COMPLETED | OUTPATIENT
Start: 2020-04-30 | End: 2020-04-30

## 2020-04-30 RX ADMIN — Medication 10 MILLIGRAM(S): at 11:11

## 2020-04-30 RX ADMIN — Medication 40 MILLIGRAM(S): at 05:45

## 2020-04-30 RX ADMIN — Medication 5 MILLIGRAM(S): at 15:36

## 2020-04-30 RX ADMIN — LISINOPRIL 2.5 MILLIGRAM(S): 2.5 TABLET ORAL at 05:46

## 2020-04-30 RX ADMIN — PANTOPRAZOLE SODIUM 40 MILLIGRAM(S): 20 TABLET, DELAYED RELEASE ORAL at 05:47

## 2020-04-30 RX ADMIN — ATORVASTATIN CALCIUM 20 MILLIGRAM(S): 80 TABLET, FILM COATED ORAL at 21:40

## 2020-04-30 RX ADMIN — ATENOLOL 25 MILLIGRAM(S): 25 TABLET ORAL at 05:45

## 2020-04-30 RX ADMIN — Medication 325 MILLIGRAM(S): at 11:11

## 2020-04-30 RX ADMIN — Medication 20 MILLIGRAM(S): at 11:11

## 2020-04-30 RX ADMIN — Medication 500 MILLIGRAM(S): at 05:47

## 2020-04-30 RX ADMIN — Medication 200 MILLIGRAM(S): at 15:36

## 2020-04-30 RX ADMIN — Medication 500 MILLIGRAM(S): at 11:11

## 2020-04-30 RX ADMIN — Medication 5000 UNIT(S): at 11:11

## 2020-04-30 RX ADMIN — PIPERACILLIN AND TAZOBACTAM 25 GRAM(S): 4; .5 INJECTION, POWDER, LYOPHILIZED, FOR SOLUTION INTRAVENOUS at 21:43

## 2020-04-30 RX ADMIN — Medication 200 MILLIGRAM(S): at 05:47

## 2020-04-30 RX ADMIN — Medication 1 TABLET(S): at 11:11

## 2020-04-30 RX ADMIN — PIPERACILLIN AND TAZOBACTAM 25 GRAM(S): 4; .5 INJECTION, POWDER, LYOPHILIZED, FOR SOLUTION INTRAVENOUS at 05:47

## 2020-04-30 RX ADMIN — Medication 500 MILLIGRAM(S): at 21:40

## 2020-04-30 RX ADMIN — PIPERACILLIN AND TAZOBACTAM 25 GRAM(S): 4; .5 INJECTION, POWDER, LYOPHILIZED, FOR SOLUTION INTRAVENOUS at 15:36

## 2020-04-30 RX ADMIN — Medication 40 MILLIGRAM(S): at 05:47

## 2020-04-30 NOTE — PHYSICAL THERAPY INITIAL EVALUATION ADULT - GENERAL OBSERVATIONS, REHAB EVAL
Pt received on 2GUL, JULIET Fang ok'ed pt for PT; pt observed semi-huff in bed, 2L O2 NC(disconnected during session, donned post eval), telemonitor with , primafit, pleasant, cooperative, A&O and c/o 0/10 pain

## 2020-04-30 NOTE — PHYSICAL THERAPY INITIAL EVALUATION ADULT - PHYSICAL ASSIST/NONPHYSICAL ASSIST: STAND/SIT, REHAB EVAL
1 person assist/verbal cues/min assist: patient able to perform 75% of physical effort/work to perform task, while treating physical therapist performs 25% of physical effort/work; pt required increased physical assistance to help perform transfer/to safely lower to a sitting position; verbal cues re proper sequence + proper hand placement in prep to perform transfer

## 2020-04-30 NOTE — PHYSICAL THERAPY INITIAL EVALUATION ADULT - ADDITIONAL COMMENTS
Pt lives in a 2 story condo (2nd level is rented out + pt has basement, but doesn't need to go down) with daughter and 2 grown grandchildren with no DONAVAN and bed&bath on ground level. Pt's PLOF was independent in all ADL's + ambulation with rollator, on home O2 night only 2 L NC and was driving PTA. Pt has rollator, SAC, transport WC and oxygen concentrator. At this time, no additional Assistive Device/DME required/recommended upon d/c

## 2020-04-30 NOTE — PROGRESS NOTE ADULT - SUBJECTIVE AND OBJECTIVE BOX
[facility], THE HEART CENTER                                   [address] [place] [address]                                                      PHONE: [phone]                                                         FAX: [phone]  [email or web address]  ---------------------------------------------------------------------------------------------------------------------------------    Reason for follow-up: acute decompensated heart failure     Overnight events/patient complaints: No acute events overnight. TTE with mildly reduced LV function and reduced RV function. Shortness of breath is improved     INTERPRETATION OF TELEMETRY (personally reviewed):    ECG: < from: 12 Lead ECG (04.26.20 @ 23:23) >  Atrial fibrillation    ECHO:  1. Left ventricular ejection fraction, by visual estimation, is 45 to 50%. Grade II diastolic dysfunction. Severely enlarged right ventricle. Moderately reduced RV systolic function. Severely enlarged left atrium.  2.  Elevated mean left atrial pressure. (LAP = 20 mm Hg)   3.  Moderately enlarged right atrium.  4. Mild mitral valve regurgitation. Moderate aortic valve stenosis.  5. TR not obtained. However, based on PA acceleration time. PASP is estimated to be 56 mm Hg.  6. There is no evidence of pericardial effusion.  7. Compared to study from 1/2019, the RV size and function appears worse.      adhesives (Blisters)  codeine (Other; Nausea)  Morphine Sulfate (Other; Nausea)    MEDICATIONS  (STANDING):  ascorbic acid 500 milliGRAM(s) Oral three times a day  ATENolol  Tablet 25 milliGRAM(s) Oral daily  atorvastatin 20 milliGRAM(s) Oral at bedtime  buDESOnide    Inhalation Suspension 0.5 milliGRAM(s) Inhalation daily  cholecalciferol 5000 Unit(s) Oral daily  ferrous    sulfate 325 milliGRAM(s) Oral daily  FLUoxetine 20 milliGRAM(s) Oral daily  furosemide    Tablet 40 milliGRAM(s) Oral daily  lisinopril 2.5 milliGRAM(s) Oral daily  multivitamin 1 Tablet(s) Oral daily  oxybutynin XL 10 milliGRAM(s) Oral daily  pantoprazole    Tablet 40 milliGRAM(s) Oral before breakfast  piperacillin/tazobactam IVPB.. 3.375 Gram(s) IV Intermittent every 8 hours  predniSONE   Tablet 40 milliGRAM(s) Oral daily  thiamine 200 milliGRAM(s) Oral <User Schedule>    MEDICATIONS  (PRN):  albuterol/ipratropium for Nebulization 3 milliLiter(s) Nebulizer every 6 hours PRN Shortness of Breath and/or Wheezing  fluticasone propionate 50 MICROgram(s)/spray Nasal Spray 1 Spray(s) Both Nostrils two times a day PRN Nasal Congestion      Vital Signs Last 24 Hrs  T(C): 36.5 (30 Apr 2020 08:21), Max: 36.9 (29 Apr 2020 19:50)  T(F): 97.7 (30 Apr 2020 08:21), Max: 98.4 (29 Apr 2020 19:50)  HR: 67 (30 Apr 2020 08:21) (62 - 75)  BP: 122/77 (30 Apr 2020 08:21) (117/69 - 154/84)  BP(mean): --  RR: 21 (30 Apr 2020 08:21) (18 - 21)  SpO2: 98% (30 Apr 2020 08:21) (97% - 100%)  ICU Vital Signs Last 24 Hrs    PHYSICAL EXAM:  General: Appears well developed, well nourished alert and cooperative.  HEENT: Head; normocephalic, atraumatic.Pupils reactive, cornea wnl. Neck supple, no nodes adenopathy, no JVD  CARDIOVASCULAR: Normal S1 and S2, 1/6 LOULOU, no rub, gallop or lift.   LUNGS: No rales, rhonchi or wheeze. Normal breath sounds bilaterally.  ABDOMEN: Soft, nontender without mass or organomegaly. bowel sounds normoactive.  EXTREMITIES: No clubbing, cyanosis or edema.   SKIN: warm and dry with normal turgor.  NEURO: Alert/oriented x 3/normal motor exam.   PSYCH: normal affect.        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 Additionally found to have PNA. COVID-19 PCR negative from 4/26    Shortness of breath  - Likely multifactorial including acute on chronic HFpEF (low normal LVEF with grade III diastolic dysfunction with elevated LAP), RUL PNA and COPD   - transition to lasix 40mg PO daily, continue antibiotic therapy, and bronchodilator therapy, close monitoring of renal indices   - O2 support and wean as tolerated   - strict I/O and agree with fluid restriction, may liberate to 1.5L  - goal K>4 and Mg>2    HTN/Dyslipidemia  - BP controlled: continue present regimen   - Continue statin    Chronic AF  - Rates controlled  - on coumadin, daily INR, INR today 4, no need to reverse coagulopathy however hold coumadin today   - continue telemetry monitoring     Thank you for allowing [facility] to participate in the care of this patient.  Please feel free to call with any questions or concerns. [facility], [facility]ART [facility]                                   [address] [place] [address]                                                      PHONE: [phone]                                                         FAX: [phone]  [email or web address]  ---------------------------------------------------------------------------------------------------------------------------------    Reason for follow-up: acute decompensated heart failure     Overnight events/patient complaints: No acute events overnight. TTE with mildly reduced LV function and reduced RV function. Shortness of breath is improved     INTERPRETATION OF TELEMETRY (personally reviewed):    ECG: < from: 12 Lead ECG (04.26.20 @ 23:23) >  Atrial fibrillation    ECHO:  1. Left ventricular ejection fraction, by visual estimation, is 45 to 50%. Grade II diastolic dysfunction. Severely enlarged right ventricle. Moderately reduced RV systolic function. Severely enlarged left atrium.  2.  Elevated mean left atrial pressure. (LAP = 20 mm Hg)   3.  Moderately enlarged right atrium.  4. Mild mitral valve regurgitation. Moderate aortic valve stenosis.  5. TR not obtained. However, based on PA acceleration time. PASP is estimated to be 56 mm Hg.  6. There is no evidence of pericardial effusion.  7. Compared to study from 1/2019, the RV size and function appears worse.      adhesives (Blisters)  codeine (Other; Nausea)  Morphine Sulfate (Other; Nausea)    MEDICATIONS  (STANDING):  ascorbic acid 500 milliGRAM(s) Oral three times a day  ATENolol  Tablet 25 milliGRAM(s) Oral daily  atorvastatin 20 milliGRAM(s) Oral at bedtime  buDESOnide    Inhalation Suspension 0.5 milliGRAM(s) Inhalation daily  cholecalciferol 5000 Unit(s) Oral daily  ferrous    sulfate 325 milliGRAM(s) Oral daily  FLUoxetine 20 milliGRAM(s) Oral daily  furosemide    Tablet 40 milliGRAM(s) Oral daily  lisinopril 2.5 milliGRAM(s) Oral daily  multivitamin 1 Tablet(s) Oral daily  oxybutynin XL 10 milliGRAM(s) Oral daily  pantoprazole    Tablet 40 milliGRAM(s) Oral before breakfast  piperacillin/tazobactam IVPB.. 3.375 Gram(s) IV Intermittent every 8 hours  predniSONE   Tablet 40 milliGRAM(s) Oral daily  thiamine 200 milliGRAM(s) Oral <User Schedule>    MEDICATIONS  (PRN):  albuterol/ipratropium for Nebulization 3 milliLiter(s) Nebulizer every 6 hours PRN Shortness of Breath and/or Wheezing  fluticasone propionate 50 MICROgram(s)/spray Nasal Spray 1 Spray(s) Both Nostrils two times a day PRN Nasal Congestion      Vital Signs Last 24 Hrs  T(C): 36.5 (30 Apr 2020 08:21), Max: 36.9 (29 Apr 2020 19:50)  T(F): 97.7 (30 Apr 2020 08:21), Max: 98.4 (29 Apr 2020 19:50)  HR: 67 (30 Apr 2020 08:21) (62 - 75)  BP: 122/77 (30 Apr 2020 08:21) (117/69 - 154/84)  BP(mean): --  RR: 21 (30 Apr 2020 08:21) (18 - 21)  SpO2: 98% (30 Apr 2020 08:21) (97% - 100%)  ICU Vital Signs Last 24 Hrs    PHYSICAL EXAM:  General: Appears well developed, well nourished alert and cooperative.  HEENT: Head; normocephalic, atraumatic.Pupils reactive, cornea wnl. Neck supple, no nodes adenopathy, no JVD  CARDIOVASCULAR: Normal S1 and S2, 1/6 LOULOU, no rub, gallop or lift.   LUNGS: No rales, rhonchi or wheeze. Normal breath sounds bilaterally.  ABDOMEN: Soft, nontender without mass or organomegaly. bowel sounds normoactive.  EXTREMITIES: No clubbing, cyanosis or edema.   SKIN: warm and dry with normal turgor.  NEURO: Alert/oriented x 3/normal motor exam.   PSYCH: normal affect.        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 Additionally found to have PNA. COVID-19 PCR negative from 4/26    Shortness of breath  - Likely multifactorial including acute on chronic HFpEF (low normal LVEF with grade III diastolic dysfunction with elevated LAP), RUL PNA and COPD   - lasix 40mg PO daily, continue antibiotic therapy, and bronchodilator therapy, close monitoring of renal indices   - O2 support and wean as tolerated, reports nocturnal O2 dependence chronically   - strict I/O and agree with fluid restriction, may liberate to 1.5L  - goal K>4 and Mg>2    HTN/Dyslipidemia  - BP controlled: continue present regimen   - Continue statin    Chronic AF  - Rates controlled  - on coumadin, daily INR, INR today 4, no need to reverse coagulopathy however hold coumadin today   - continue telemetry monitoring     Thank you for allowing [facility] to participate in the care of this patient.  Please feel free to call with any questions or concerns.

## 2020-04-30 NOTE — PROGRESS NOTE ADULT - ASSESSMENT
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 Pt initially placed on IV lasix, and steroids, showing much improvement. PT eval ordered. Course complicated by persistently elevated INR despite coumadin being held. Hematology consulted, and suspecting vitamin k deficiency. To be given 5mg PO vitamin K today and f/u INR tomorrow. Pt should also be started on prophylactic lovenox tomorrow as she is high risk for clots given her DVT hx. Overall clinically improved.     Supratherapuetic INR due to suspected malnutrition/vitamin K deficiency  - Pts INR on admission was 3.15  - Continues to increase, currently @ 4.40  - Hematology consulted and appreciated. Recommendign 6mg PO Vitamin K and follow INR in am.  - Also recommending pt to be started on prophylactic lovenox tomorrow as pt is high risk for repeat clots.     Acute on chronic hypoxemic respiratory failure due to RUL PNA and decompensated CHF  - Pt shows much clinical improvement.   -c/w with monitor and    -s/p 2 doses of azithro and rocephin. Concerns for aspiration, switched to Zosyn   -IV Lasix 40mg for diuresis deescalated to 40mg daily   -Increased O2 requirements from 2 to 4L   -Wean O2 back to baseline requirements    Acute decompensation CHF in setting of medication non-compliance  -Pt missed 2 days of PO Lasix prior to admission  -TTE showed EF of 45-55 % with reduced RV function and moderate Aortic stenosis.   -lasix switched to PO.   -1L fluid restriction   -Cardiology eval ([place] consulted)    Hypomagnesemia   -Admission Mg 1.4, repleted in ED   -Monitor BMP and replete accordingly     Afib  -On coumadin, admission INR supratherapeutic at 3.58. Currently being held  - F/U in am  -Atenolol resumed    HTN  -On atenolol and lisinopril    COPD exacerbation  - Pt has been wheezing since admission  - Solumedrol 60 mg q 8. deescalated to 40 mg daily     Depression  -Fluoxetine resumed    Overactive bladder  -Oxybutynin resumed    Prophylactic measure  -On coumadin but currently being held, as pts INR is supratherapuetic.     DISPO: Pt is DNR/DNI. Molst form signed and in chart. 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 Pt initially placed on IV lasix, and steroids, showing much improvement. PT eval ordered. Course complicated by persistently elevated INR despite coumadin being held. Hematology consulted, and suspecting vitamin k deficiency. To be given 5mg PO vitamin K today and f/u INR tomorrow. Pt should also be started on prophylactic lovenox tomorrow as she is high risk for clots given her DVT hx. Overall clinically improved.     Supratherapuetic INR due to suspected malnutrition/vitamin K deficiency  - Pts INR on admission was 3.15  - Continues to increase, currently @ 4.40  - Hematology consulted and appreciated. Recommendign 6mg PO Vitamin K and follow INR in am.  - Also recommending pt to be started on prophylactic lovenox tomorrow as pt is high risk for repeat clots.     Acute on chronic hypoxemic respiratory failure due to RUL PNA and decompensated CHF  - Pt shows much clinical improvement.   -c/w with monitor and    -s/p 2 doses of azithro and rocephin. Concerns for aspiration, switched to Zosyn   -IV Lasix 40mg for diuresis deescalated to 40mg daily   -Increased O2 requirements from 2 to 4L   -Wean O2 back to baseline requirements    Acute decompensation CHF in setting of medication non-compliance  -Pt missed 2 days of PO Lasix prior to admission  -TTE showed EF of 45-55 % with reduced RV function and moderate Aortic stenosis.   -lasix switched to PO.   -1L fluid restriction   -Cardiology eval ([place] consulted)    Hypomagnesemia   -Admission Mg 1.4, repleted in ED   -Monitor BMP and replete accordingly     Afib  -On coumadin, admission INR supratherapeutic at 3.58. Currently being held  - F/U in am  -Atenolol resumed    HTN  -On atenolol and lisinopril    COPD exacerbation  - Pt has been wheezing since admission  - Solumedrol 60 mg q 8. deescalated to 40 mg daily     Depression  -Fluoxetine resumed    Overactive bladder  -Oxybutynin resumed    Prophylactic measure  -On coumadin but currently being held, as pts INR is supratherapuetic.     DISPO: Pt is DNR/DNI. Molst form signed and in chart. Daughter spoken to and updated on plan of care.

## 2020-04-30 NOTE — CONSULT NOTE ADULT - SUBJECTIVE AND OBJECTIVE BOX
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  spoke with daughter and patient both. Patient has h/o LLE DVT in 2015 and as per patient she had a PE too at that time, she had IVC filter placed and was started on coumadin. she also has h/o A fib which is another reason why she is on warfarin.  On 4/26 INR was 3.48, despite coumadin being held INR on 4/30 was 4.4.    Patient seen at bedside . resting comfortably and in no acute distress.    ROS negative other than mentione din HPI    Physical exam  Gen : Obese, alert x oriented x3  Heart S1S2 RRR  Lung decreased BS B/L  Ext b/l 1+ edema  abdomen - soft distended non tender                        14.8   10.41 )-----------( 342      ( 30 Apr 2020 08:25 )             46.1   04-30    139  |  97<L>  |  60.0<H>  ----------------------------<  123<H>  4.2   |  30.0<H>  |  1.06    Ca    9.5      30 Apr 2020 08:25  Phos  3.7     04-30  Mg     2.3     04-30    PT/INR - ( 30 Apr 2020 08:25 )   PT: 52.1 sec;   INR: 4.40 ratio

## 2020-04-30 NOTE — PROGRESS NOTE ADULT - SUBJECTIVE AND OBJECTIVE BOX
YOSELYN PAULSON  86y, Female    Complaints: Acute on chronic hypoxemic respiratory failure due to PNA and decompensated CHF  Subjective: Pt seen and examined at bedside. Pt feels ok but is tired. As per RN, no overnight events. INR remains elevated despite coumadin being held. Hematology consulted, and pt to be given vitamin K today and tomorrow. Recommending starting pt on prophylactic lovenox tomorrow. Pt denies chest pain, shortness of breath, no palpitations ROS otherwise negative.    Vital Signs Last 24 Hrs  T(C): 36.5 (30 Apr 2020 08:21), Max: 36.9 (29 Apr 2020 19:50)  T(F): 97.7 (30 Apr 2020 08:21), Max: 98.4 (29 Apr 2020 19:50)  HR: 67 (30 Apr 2020 08:21) (62 - 75)  BP: 122/77 (30 Apr 2020 08:21) (122/77 - 154/84)  RR: 21 (30 Apr 2020 08:21) (18 - 21)  SpO2: 98% (30 Apr 2020 08:21) (97% - 100%)    Physical Exam:  Due to the nature of this patient's COVID-19 isolation status, no bedside physical exam was done to limit spread of infection. Examination highlights were provided by bedside nurse. Objective data were reviewed in detail      LABS:                        14.8   10.41 )-----------( 342      ( 30 Apr 2020 08:25 )             46.1   04-30    139  |  97<L>  |  60.0<H>  ----------------------------<  123<H>  4.2   |  30.0<H>  |  1.06    Ca    9.5      30 Apr 2020 08:25  Phos  3.7     04-30  Mg     2.3     04-30      MedsMEDICATIONS  (STANDING):  ascorbic acid 500 milliGRAM(s) Oral three times a day  ATENolol  Tablet 25 milliGRAM(s) Oral daily  atorvastatin 20 milliGRAM(s) Oral at bedtime  buDESOnide    Inhalation Suspension 0.5 milliGRAM(s) Inhalation daily  cholecalciferol 5000 Unit(s) Oral daily  ferrous    sulfate 325 milliGRAM(s) Oral daily  FLUoxetine 20 milliGRAM(s) Oral daily  furosemide    Tablet 40 milliGRAM(s) Oral daily  lisinopril 2.5 milliGRAM(s) Oral daily  multivitamin 1 Tablet(s) Oral daily  oxybutynin XL 10 milliGRAM(s) Oral daily  pantoprazole    Tablet 40 milliGRAM(s) Oral before breakfast  piperacillin/tazobactam IVPB.. 3.375 Gram(s) IV Intermittent every 8 hours  predniSONE   Tablet 40 milliGRAM(s) Oral daily  thiamine 200 milliGRAM(s) Oral <User Schedule>    MEDICATIONS  (PRN):  albuterol/ipratropium for Nebulization 3 milliLiter(s) Nebulizer every 6 hours PRN Shortness of Breath and/or Wheezing  fluticasone propionate 50 MICROgram(s)/spray Nasal Spray 1 Spray(s) Both Nostrils two times a day PRN Nasal Congestion      HEALTH ISSUES - PROBLEM Dx:

## 2020-04-30 NOTE — PHYSICAL THERAPY INITIAL EVALUATION ADULT - PHYSICAL ASSIST/NONPHYSICAL ASSIST: SIT/STAND, REHAB EVAL
min assist: patient able to perform 75% of physical effort/work to perform task, while treating physical therapist performs 25% of physical effort/work; pt required increased physical assistance to help perform transfer/to rise to a full standing position; verbal cues re proper sequence + proper hand placement in prep to perform transfer/1 person assist/verbal cues

## 2020-04-30 NOTE — PHYSICAL THERAPY INITIAL EVALUATION ADULT - PHYSICAL ASSIST/NONPHYSICAL ASSIST: GAIT, REHAB EVAL
verbal cues/1 person assist/a variation of assistance where patient requires occassional contact to maintain balance or dynamic stability; requires hand contact because of occassional loss of balance; required physical assistance to help regain LOB during ambulation x 2 episodes, however, pt did not fall; pt required increased physical assistance to help maintain proper upright walking posture during ambulation; verbal cues re proper gait sequence + proper use of RW

## 2020-04-30 NOTE — CONSULT NOTE ADULT - REASON FOR ADMISSION
Acute on chronic hypoxemic respiratory failure due to PNA and decompensated CHF
Acute on chronic hypoxemic respiratory failure due to PNA and decompensated CHF

## 2020-04-30 NOTE — CONSULT NOTE ADULT - ASSESSMENT
86yoF extensive PMhx including COPD on 2L home O2, HFpEF (50-55%) Afib s/p ablation and PPM, on coumadin, prior TIA, LLE DVT and PE (2015) s/p IVC filter, JUAN non-compliant with CPAP who initially presented to ED with right sided chest pain, dyspnea found to have RUL PNA, then was d/c'ed to home and returned to ED the same day with worsening symptoms and increased O2 requirements, being admitted for acute on chronic hypoxemic respiratory failure due to PNA and decompensated CHF  COVID negative    hematology was consulted for persistent elevated INR despite holding coumadin.  spoke with daughter and patient both. Patient has h/o LLE DVT in 2015 and as per patient she had a PE too at that time, she had IVC filter placed and was started on coumadin. she also has h/o A fib which is another reason why she is on warfarin.  On 4/26 INR was 3.48, despite coumadin being held INR on 4/30 was 4.4.    1) Her supratherapeutic INR despite stopping coumadin is likely dietary/malabsorption related, another reason could be current medications/ABX interfering with coumadin clearance.  Recommend PO vitamin K 5 mg 2-3 days and monitor INR daily. there are no clinical signs of bleeding currently.    2) Regarding her h/o DVT/PE was back in 2015 and she had IVC filter placed. unsure if it was provoked/unprovoked. She is currently at high risk for recurrent DVT given she has h/o DVT, she is obese and bed ridden and there is still clinical suspicion of COVID.   Recommend giving vitamin k 5mg po today,check INR tomorrow and if going down start full dose Lovenox 1 mg/kg ass an alternative A/C method until her acute illness is recovered and she can take PO coumadin. If concern for excessive bleeding can also do Lovenox PPX 40 mg instead.    3) CBC is stable.    will continue to follow.

## 2020-05-01 ENCOUNTER — TRANSCRIPTION ENCOUNTER (OUTPATIENT)
Age: 85
End: 2020-05-01

## 2020-05-01 LAB
ANION GAP SERPL CALC-SCNC: 13 MMOL/L — SIGNIFICANT CHANGE UP (ref 5–17)
APTT BLD: 38.9 SEC — HIGH (ref 27.5–36.3)
BASOPHILS # BLD AUTO: 0.01 K/UL — SIGNIFICANT CHANGE UP (ref 0–0.2)
BASOPHILS NFR BLD AUTO: 0.1 % — SIGNIFICANT CHANGE UP (ref 0–2)
BUN SERPL-MCNC: 49 MG/DL — HIGH (ref 8–20)
CALCIUM SERPL-MCNC: 10.2 MG/DL — SIGNIFICANT CHANGE UP (ref 8.6–10.2)
CHLORIDE SERPL-SCNC: 96 MMOL/L — LOW (ref 98–107)
CO2 SERPL-SCNC: 34 MMOL/L — HIGH (ref 22–29)
CREAT SERPL-MCNC: 0.94 MG/DL — SIGNIFICANT CHANGE UP (ref 0.5–1.3)
CULTURE RESULTS: SIGNIFICANT CHANGE UP
CULTURE RESULTS: SIGNIFICANT CHANGE UP
EOSINOPHIL # BLD AUTO: 0.07 K/UL — SIGNIFICANT CHANGE UP (ref 0–0.5)
EOSINOPHIL NFR BLD AUTO: 0.4 % — SIGNIFICANT CHANGE UP (ref 0–6)
GLUCOSE SERPL-MCNC: 94 MG/DL — SIGNIFICANT CHANGE UP (ref 70–99)
HCT VFR BLD CALC: 50.8 % — HIGH (ref 34.5–45)
HGB BLD-MCNC: 16.1 G/DL — HIGH (ref 11.5–15.5)
IMM GRANULOCYTES NFR BLD AUTO: 5.1 % — HIGH (ref 0–1.5)
INR BLD: 1.53 RATIO — HIGH (ref 0.88–1.16)
LYMPHOCYTES # BLD AUTO: 0.75 K/UL — LOW (ref 1–3.3)
LYMPHOCYTES # BLD AUTO: 4.3 % — LOW (ref 13–44)
MAGNESIUM SERPL-MCNC: 1.9 MG/DL — SIGNIFICANT CHANGE UP (ref 1.6–2.6)
MCHC RBC-ENTMCNC: 30.8 PG — SIGNIFICANT CHANGE UP (ref 27–34)
MCHC RBC-ENTMCNC: 31.7 GM/DL — LOW (ref 32–36)
MCV RBC AUTO: 97.1 FL — SIGNIFICANT CHANGE UP (ref 80–100)
MONOCYTES # BLD AUTO: 0.43 K/UL — SIGNIFICANT CHANGE UP (ref 0–0.9)
MONOCYTES NFR BLD AUTO: 2.5 % — SIGNIFICANT CHANGE UP (ref 2–14)
NEUTROPHILS # BLD AUTO: 15.19 K/UL — HIGH (ref 1.8–7.4)
NEUTROPHILS NFR BLD AUTO: 87.6 % — HIGH (ref 43–77)
PHOSPHATE SERPL-MCNC: 2.6 MG/DL — SIGNIFICANT CHANGE UP (ref 2.4–4.7)
PLATELET # BLD AUTO: 412 K/UL — HIGH (ref 150–400)
POTASSIUM SERPL-MCNC: 4.7 MMOL/L — SIGNIFICANT CHANGE UP (ref 3.5–5.3)
POTASSIUM SERPL-SCNC: 4.7 MMOL/L — SIGNIFICANT CHANGE UP (ref 3.5–5.3)
PROTHROM AB SERPL-ACNC: 17.5 SEC — HIGH (ref 10–12.9)
RBC # BLD: 5.23 M/UL — HIGH (ref 3.8–5.2)
RBC # FLD: 14.5 % — SIGNIFICANT CHANGE UP (ref 10.3–14.5)
SODIUM SERPL-SCNC: 143 MMOL/L — SIGNIFICANT CHANGE UP (ref 135–145)
SPECIMEN SOURCE: SIGNIFICANT CHANGE UP
SPECIMEN SOURCE: SIGNIFICANT CHANGE UP
WBC # BLD: 17.33 K/UL — HIGH (ref 3.8–10.5)
WBC # FLD AUTO: 17.33 K/UL — HIGH (ref 3.8–10.5)

## 2020-05-01 PROCEDURE — 99233 SBSQ HOSP IP/OBS HIGH 50: CPT | Mod: GC,25

## 2020-05-01 PROCEDURE — 93970 EXTREMITY STUDY: CPT | Mod: 26

## 2020-05-01 RX ORDER — WARFARIN SODIUM 2.5 MG/1
2 TABLET ORAL ONCE
Refills: 0 | Status: DISCONTINUED | OUTPATIENT
Start: 2020-05-01 | End: 2020-05-01

## 2020-05-01 RX ORDER — ENOXAPARIN SODIUM 100 MG/ML
40 INJECTION SUBCUTANEOUS
Refills: 0 | Status: DISCONTINUED | OUTPATIENT
Start: 2020-05-01 | End: 2020-05-02

## 2020-05-01 RX ORDER — ACETAMINOPHEN 500 MG
650 TABLET ORAL ONCE
Refills: 0 | Status: COMPLETED | OUTPATIENT
Start: 2020-05-01 | End: 2020-05-01

## 2020-05-01 RX ORDER — ENOXAPARIN SODIUM 100 MG/ML
100 INJECTION SUBCUTANEOUS
Refills: 0 | Status: DISCONTINUED | OUTPATIENT
Start: 2020-05-01 | End: 2020-05-01

## 2020-05-01 RX ADMIN — Medication 5000 UNIT(S): at 11:53

## 2020-05-01 RX ADMIN — LISINOPRIL 2.5 MILLIGRAM(S): 2.5 TABLET ORAL at 05:22

## 2020-05-01 RX ADMIN — Medication 40 MILLIGRAM(S): at 05:21

## 2020-05-01 RX ADMIN — ATORVASTATIN CALCIUM 20 MILLIGRAM(S): 80 TABLET, FILM COATED ORAL at 22:33

## 2020-05-01 RX ADMIN — Medication 20 MILLIGRAM(S): at 11:54

## 2020-05-01 RX ADMIN — PIPERACILLIN AND TAZOBACTAM 25 GRAM(S): 4; .5 INJECTION, POWDER, LYOPHILIZED, FOR SOLUTION INTRAVENOUS at 15:30

## 2020-05-01 RX ADMIN — ENOXAPARIN SODIUM 40 MILLIGRAM(S): 100 INJECTION SUBCUTANEOUS at 17:32

## 2020-05-01 RX ADMIN — PANTOPRAZOLE SODIUM 40 MILLIGRAM(S): 20 TABLET, DELAYED RELEASE ORAL at 05:22

## 2020-05-01 RX ADMIN — PIPERACILLIN AND TAZOBACTAM 25 GRAM(S): 4; .5 INJECTION, POWDER, LYOPHILIZED, FOR SOLUTION INTRAVENOUS at 05:21

## 2020-05-01 RX ADMIN — Medication 500 MILLIGRAM(S): at 22:33

## 2020-05-01 RX ADMIN — Medication 1 TABLET(S): at 11:54

## 2020-05-01 RX ADMIN — ATENOLOL 25 MILLIGRAM(S): 25 TABLET ORAL at 05:21

## 2020-05-01 RX ADMIN — Medication 200 MILLIGRAM(S): at 05:21

## 2020-05-01 RX ADMIN — Medication 10 MILLIGRAM(S): at 11:53

## 2020-05-01 RX ADMIN — Medication 200 MILLIGRAM(S): at 15:30

## 2020-05-01 RX ADMIN — Medication 40 MILLIGRAM(S): at 05:22

## 2020-05-01 RX ADMIN — Medication 325 MILLIGRAM(S): at 11:54

## 2020-05-01 RX ADMIN — PIPERACILLIN AND TAZOBACTAM 25 GRAM(S): 4; .5 INJECTION, POWDER, LYOPHILIZED, FOR SOLUTION INTRAVENOUS at 22:32

## 2020-05-01 RX ADMIN — Medication 500 MILLIGRAM(S): at 05:21

## 2020-05-01 RX ADMIN — Medication 500 MILLIGRAM(S): at 11:54

## 2020-05-01 NOTE — PROGRESS NOTE ADULT - ASSESSMENT
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 Pt initially placed on IV lasix, and steroids, showing much improvement. PT eval ordered. Course complicated by persistently elevated INR despite coumadin being held. Hematology consulted, and suspecting vitamin k deficiency. To be given 5mg PO vitamin K today. INR on 1/May/2020 was under therapeutic levels. Pt started on ppx dose lovenox on 1/May/2020 as she is high risk for clots given her DVT hx as per heme/onc. Overall clinically improved.     INR instability due to suspected malnutrition/vitamin K deficiency, now under therapeutic levels, s/p vitamin K  - Pts INR on admission was 3.15  - Now under therapeutic levels, currently @ 1.53  - Hematology consulted and appreciated. Recommended 6mg PO Vitamin K with follow up INR.   - Also recommending pt to be started on prophylactic lovenox today as pt is high risk for repeat clots.     Acute on chronic hypoxemic respiratory failure due to RUL PNA and decompensated CHF  -Pt shows clinical improvement. Decreased LE edema but continues with bilateral rales.   -c/w with monitor and    -s/p 2 doses of azithro and rocephin. Concerns for aspiration, switched to Zosyn   -IV Lasix 40mg for diuresis deescalated to 40mg daily   -Increased O2 requirements from 2 to 4L   -Wean O2 back to baseline requirements    Acute decompensation CHF in setting of medication non-compliance  -Pt missed 2 days of PO Lasix prior to admission  -TTE showed EF of 45-55 % with reduced RV function and moderate Aortic stenosis.   -lasix switched to PO.   -1L fluid restriction   -Cardiology eval ([place] consulted)    Hypomagnesemia   -Admission Mg 1.4, repleted in ED   -Monitor BMP and replete accordingly     Afib  -On coumadin, admission INR supratherapeutic at 3.58. Currently being held  - INR now infratherapeutic levels, will switch to lovenox ppx dose as per heme/onc  -Atenolol resumed    HTN  -On atenolol and lisinopril    COPD exacerbation  - Pt has been wheezing since admission  - Solumedrol 60 mg q 8. de-escalated to 40 mg daily     Depression  -Fluoxetine resumed    Overactive bladder  -Oxybutynin resumed    Prophylactic measure  -On coumadin but currently being held, due to supratherapuetic INR. Now with ppx lovenox.    DISPO: Pt is DNR/DNI. Molst form signed and in chart.

## 2020-05-01 NOTE — DISCHARGE NOTE NURSING/CASE MANAGEMENT/SOCIAL WORK - PATIENT PORTAL LINK FT
You can access the FollowMyHealth Patient Portal offered by [facility] by registering at the following website: [email or web address]. By joining [facility]’s FollowMyHealth portal, you will also be able to view your health information using other applications (apps) compatible with our system.

## 2020-05-01 NOTE — CHART NOTE - NSCHARTNOTEFT_GEN_A_CORE
Patient has complaints of bilateral leg pain, patient states the pain is primarily in the thighs.   Patient states the this leg pain has been ongoing for 1 week with a gradual increase in pain.  Patient states she has had numbness in the feet, chronic back pain, and chronic incontinence since her lumbar laminectomy over 10 years ago.  Patient usually ambulates at home but has been bedridden since admission.     PHYSICAL EXAM:  GENERAL: Pt lying comfortably, NAD.  MUSCULOSKELETAL: Normal range of motion, 5/5 strength in bilateral legs.   SKIN: Warm and dry, venous stasis noted in bilateral legs. Capillary refill <2 sec in toes.    NEURO: AAOX3, no focal deficits. Full sensation in all toes. Reflexes intact. Pulses are faint due to hx of venous stasis.      Most likely muscular pain  Will order ultrasound of lower extremities as patient has a history of DVT/PE with IVC filter  Tylenol 650mg x 1 given  Consider further workup if pain continues  Will continue to monitor Patient has complaints of bilateral leg pain, patient states the pain is primarily in the thighs.   Patient states the this leg pain has been ongoing for 1 week with a gradual increase in pain.  Patient states she has had numbness in the feet, chronic back pain, and chronic incontinence since her lumbar laminectomy over 10 years ago.  Patient usually ambulates at home but has been bedridden since admission.   Denies SOB, chest pain, palpations. VSS at this time.     PHYSICAL EXAM:  GENERAL: Pt lying comfortably, NAD.  MUSCULOSKELETAL: Normal range of motion, 5/5 strength in bilateral legs. Mild pain with palpation of anterior thighs.   SKIN: Warm and dry, venous stasis noted in bilateral legs. Capillary refill <2 sec in toes. No edema.   NEURO: AAOX3, no focal deficits. Full sensation in all toes. Reflexes intact. Pulses are faint due to hx of venous stasis.      Most likely muscular pain  Will order ultrasound of lower extremities as patient has a history of DVT/PE with IVC filter  Tylenol 650mg x 1 given  Consider further workup if pain continues  Will continue to monitor

## 2020-05-01 NOTE — PROGRESS NOTE ADULT - SUBJECTIVE AND OBJECTIVE BOX
YOSELYN PAULSON  86y, Female    Complaints: Acute on chronic hypoxemic respiratory failure due to PNA and decompensated CHF  Subjective: Pt seen and examined at bedside. Pt feels ok at rest but quickly feels weak after moving in bed. Complained of back pain and bilateral leg numbness overnight. Does not have back pain or leg pain in AM. INR dropped to below indicated anticoagulation levels. To start full dose lovenox today as per heme/onc, and transition back to coumadin after COVID disease has passed. Pt denies chest pain, shortness of breath, no palpitations ROS otherwise negative.    Vital Signs Last 24 Hrs  T(C): 36.5 (30 Apr 2020 08:21), Max: 36.9 (29 Apr 2020 19:50)  T(F): 97.7 (30 Apr 2020 08:21), Max: 98.4 (29 Apr 2020 19:50)  HR: 67 (30 Apr 2020 08:21) (62 - 75)  BP: 122/77 (30 Apr 2020 08:21) (122/77 - 154/84)  RR: 21 (30 Apr 2020 08:21) (18 - 21)  SpO2: 98% (30 Apr 2020 08:21) (97% - 100%)    Physical Exam:  Const: Awake, alert and oriented x3. In No Acute Distress. Morbidly obese elderly female on Nasal Cannula at 3 lts.  HEENT: NC/AT, PERRLA, EOMI, clear nares, Moist mucous membranes.  Neck: Soft and supple. Full ROM without pain.  Cardiovascular: Regular rate and regular rhythm. +S1/S2. No murmurs. Peripheral pulses 2+ and symmetric x4. No LE edema.  Respiratory: Speaking in full sentences. No evidence of respiratory distress. Rales bilaterally, more prominent over the right side. No wheezes, crackles, or rhonchi.  Abd: Obese Abdomen, Normal bowel sounds in all 4 quadrants, Soft, non-distended. non-tender. No guarding or rebound.  Skin: No rashes, discolorations, abrasions, lacerations, lesions, ulcers noted.   Neuro: Awake, alert & oriented x 3, CN II-XII grossly intact, Moving all extremities grossly.    LABS:             16.1   17.33 )-----------( 412      ( 01 May 2020 08:36 )             50.8     05-01  143  |  96<L>  |  49.0<H>  ----------------------------<  94  4.7   |  34.0<H>  |  0.94    Ca    10.2      01 May 2020 08:36  Phos  2.6     05-01  Mg     1.9     05-01    PT/INR - ( 01 May 2020 08:36 )   PT: 17.5 sec;   INR: 1.53 ratio    PTT - ( 01 May 2020 08:36 )  PTT:38.9 sec    < from: US Duplex Venous Lower Ext Complete, Bilateral (05.01.20 @ 12:30) >  FINDINGS:  There is normal compressibility of the bilateral common femoral, femoral and popliteal veins.   Doppler examination shows normal spontaneous and phasic flow.  No calf vein thrombosis is detected.    IMPRESSION:   No evidence of deep venous thrombosis in either lower extremity.    REGINA PISANO M.D., ATTENDING RADIOLOGIST  This document has been electronically signed. May  1 2020 12:33PM  < end of copied text >    MEDICATIONS  (STANDING):  ascorbic acid 500 milliGRAM(s) Oral three times a day  ATENolol  Tablet 25 milliGRAM(s) Oral daily  atorvastatin 20 milliGRAM(s) Oral at bedtime  buDESOnide    Inhalation Suspension 0.5 milliGRAM(s) Inhalation daily  cholecalciferol 5000 Unit(s) Oral daily  enoxaparin Injectable 100 milliGRAM(s) SubCutaneous two times a day  ferrous    sulfate 325 milliGRAM(s) Oral daily  FLUoxetine 20 milliGRAM(s) Oral daily  furosemide    Tablet 40 milliGRAM(s) Oral daily  lisinopril 2.5 milliGRAM(s) Oral daily  multivitamin 1 Tablet(s) Oral daily  oxybutynin XL 10 milliGRAM(s) Oral daily  pantoprazole    Tablet 40 milliGRAM(s) Oral before breakfast  piperacillin/tazobactam IVPB.. 3.375 Gram(s) IV Intermittent every 8 hours  predniSONE   Tablet 40 milliGRAM(s) Oral daily  thiamine 200 milliGRAM(s) Oral <User Schedule>    MEDICATIONS  (PRN):  albuterol/ipratropium for Nebulization 3 milliLiter(s) Nebulizer every 6 hours PRN Shortness of Breath and/or Wheezing  fluticasone propionate 50 MICROgram(s)/spray Nasal Spray 1 Spray(s) Both Nostrils two times a day PRN Nasal Congestion

## 2020-05-02 LAB
ANION GAP SERPL CALC-SCNC: 11 MMOL/L — SIGNIFICANT CHANGE UP (ref 5–17)
ANISOCYTOSIS BLD QL: SIGNIFICANT CHANGE UP
BASOPHILS # BLD AUTO: 0 K/UL — SIGNIFICANT CHANGE UP (ref 0–0.2)
BASOPHILS NFR BLD AUTO: 0 % — SIGNIFICANT CHANGE UP (ref 0–2)
BUN SERPL-MCNC: 41 MG/DL — HIGH (ref 8–20)
CALCIUM SERPL-MCNC: 9.5 MG/DL — SIGNIFICANT CHANGE UP (ref 8.6–10.2)
CHLORIDE SERPL-SCNC: 96 MMOL/L — LOW (ref 98–107)
CO2 SERPL-SCNC: 35 MMOL/L — HIGH (ref 22–29)
CREAT SERPL-MCNC: 1.04 MG/DL — SIGNIFICANT CHANGE UP (ref 0.5–1.3)
CULTURE RESULTS: SIGNIFICANT CHANGE UP
CULTURE RESULTS: SIGNIFICANT CHANGE UP
EOSINOPHIL # BLD AUTO: 0.37 K/UL — SIGNIFICANT CHANGE UP (ref 0–0.5)
EOSINOPHIL NFR BLD AUTO: 2.6 % — SIGNIFICANT CHANGE UP (ref 0–6)
GIANT PLATELETS BLD QL SMEAR: PRESENT — SIGNIFICANT CHANGE UP
GLUCOSE SERPL-MCNC: 98 MG/DL — SIGNIFICANT CHANGE UP (ref 70–99)
HCT VFR BLD CALC: 47.5 % — HIGH (ref 34.5–45)
HGB BLD-MCNC: 15.1 G/DL — SIGNIFICANT CHANGE UP (ref 11.5–15.5)
INR BLD: 1.18 RATIO — HIGH (ref 0.88–1.16)
LYMPHOCYTES # BLD AUTO: 0.6 K/UL — LOW (ref 1–3.3)
LYMPHOCYTES # BLD AUTO: 4.3 % — LOW (ref 13–44)
MAGNESIUM SERPL-MCNC: 1.8 MG/DL — SIGNIFICANT CHANGE UP (ref 1.8–2.6)
MANUAL SMEAR VERIFICATION: SIGNIFICANT CHANGE UP
MCHC RBC-ENTMCNC: 30.6 PG — SIGNIFICANT CHANGE UP (ref 27–34)
MCHC RBC-ENTMCNC: 31.8 GM/DL — LOW (ref 32–36)
MCV RBC AUTO: 96.3 FL — SIGNIFICANT CHANGE UP (ref 80–100)
MONOCYTES # BLD AUTO: 0.73 K/UL — SIGNIFICANT CHANGE UP (ref 0–0.9)
MONOCYTES NFR BLD AUTO: 5.2 % — SIGNIFICANT CHANGE UP (ref 2–14)
MYELOCYTES NFR BLD: 4.3 % — HIGH (ref 0–0)
NEUTROPHILS # BLD AUTO: 10.16 K/UL — HIGH (ref 1.8–7.4)
NEUTROPHILS NFR BLD AUTO: 72.4 % — SIGNIFICANT CHANGE UP (ref 43–77)
PHOSPHATE SERPL-MCNC: 2.7 MG/DL — SIGNIFICANT CHANGE UP (ref 2.4–4.7)
PLAT MORPH BLD: NORMAL — SIGNIFICANT CHANGE UP
PLATELET # BLD AUTO: 397 K/UL — SIGNIFICANT CHANGE UP (ref 150–400)
POIKILOCYTOSIS BLD QL AUTO: SLIGHT — SIGNIFICANT CHANGE UP
POTASSIUM SERPL-MCNC: 4.2 MMOL/L — SIGNIFICANT CHANGE UP (ref 3.5–5.3)
POTASSIUM SERPL-SCNC: 4.2 MMOL/L — SIGNIFICANT CHANGE UP (ref 3.5–5.3)
PROMYELOCYTES # FLD: 3.4 % — HIGH (ref 0–0)
PROTHROM AB SERPL-ACNC: 13.4 SEC — HIGH (ref 10–12.9)
RBC # BLD: 4.93 M/UL — SIGNIFICANT CHANGE UP (ref 3.8–5.2)
RBC # FLD: 14.4 % — SIGNIFICANT CHANGE UP (ref 10.3–14.5)
RBC BLD AUTO: SIGNIFICANT CHANGE UP
SCHISTOCYTES BLD QL AUTO: SLIGHT — SIGNIFICANT CHANGE UP
SODIUM SERPL-SCNC: 142 MMOL/L — SIGNIFICANT CHANGE UP (ref 135–145)
SPECIMEN SOURCE: SIGNIFICANT CHANGE UP
SPECIMEN SOURCE: SIGNIFICANT CHANGE UP
VARIANT LYMPHS # BLD: 7.8 % — HIGH (ref 0–6)
WBC # BLD: 14.04 K/UL — HIGH (ref 3.8–10.5)
WBC # FLD AUTO: 14.04 K/UL — HIGH (ref 3.8–10.5)

## 2020-05-02 PROCEDURE — 99233 SBSQ HOSP IP/OBS HIGH 50: CPT | Mod: GC,25

## 2020-05-02 RX ORDER — ENOXAPARIN SODIUM 100 MG/ML
100 INJECTION SUBCUTANEOUS
Refills: 0 | Status: COMPLETED | OUTPATIENT
Start: 2020-05-02 | End: 2020-05-09

## 2020-05-02 RX ADMIN — PANTOPRAZOLE SODIUM 40 MILLIGRAM(S): 20 TABLET, DELAYED RELEASE ORAL at 06:29

## 2020-05-02 RX ADMIN — ATORVASTATIN CALCIUM 20 MILLIGRAM(S): 80 TABLET, FILM COATED ORAL at 20:48

## 2020-05-02 RX ADMIN — Medication 325 MILLIGRAM(S): at 12:08

## 2020-05-02 RX ADMIN — LISINOPRIL 2.5 MILLIGRAM(S): 2.5 TABLET ORAL at 06:29

## 2020-05-02 RX ADMIN — Medication 20 MILLIGRAM(S): at 12:08

## 2020-05-02 RX ADMIN — Medication 40 MILLIGRAM(S): at 06:29

## 2020-05-02 RX ADMIN — Medication 500 MILLIGRAM(S): at 06:28

## 2020-05-02 RX ADMIN — Medication 500 MILLIGRAM(S): at 20:48

## 2020-05-02 RX ADMIN — PIPERACILLIN AND TAZOBACTAM 25 GRAM(S): 4; .5 INJECTION, POWDER, LYOPHILIZED, FOR SOLUTION INTRAVENOUS at 06:28

## 2020-05-02 RX ADMIN — Medication 10 MILLIGRAM(S): at 12:08

## 2020-05-02 RX ADMIN — ENOXAPARIN SODIUM 40 MILLIGRAM(S): 100 INJECTION SUBCUTANEOUS at 06:28

## 2020-05-02 RX ADMIN — Medication 500 MILLIGRAM(S): at 12:08

## 2020-05-02 RX ADMIN — Medication 30 MILLILITER(S): at 06:28

## 2020-05-02 RX ADMIN — ATENOLOL 25 MILLIGRAM(S): 25 TABLET ORAL at 06:29

## 2020-05-02 RX ADMIN — Medication 200 MILLIGRAM(S): at 06:28

## 2020-05-02 RX ADMIN — Medication 1 TABLET(S): at 12:08

## 2020-05-02 RX ADMIN — Medication 200 MILLIGRAM(S): at 17:34

## 2020-05-02 RX ADMIN — ENOXAPARIN SODIUM 100 MILLIGRAM(S): 100 INJECTION SUBCUTANEOUS at 17:34

## 2020-05-02 RX ADMIN — Medication 5000 UNIT(S): at 12:08

## 2020-05-02 NOTE — PROGRESS NOTE ADULT - SUBJECTIVE AND OBJECTIVE BOX
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  spoke with daughter and patient both. Patient has h/o LLE DVT in 2015 and as per patient she had a PE too at that time, she had IVC filter placed and was started on coumadin. she also has h/o A fib which is another reason why she is on warfarin.  On 4/26 INR was 3.48, despite coumadin being held INR on 4/30 was 4.4.    Patient seen at bedside . resting comfortably and in no acute distress.  Off Coumadin; INR 1.18; on Lovenox    ROS negative other than mentione din HPI    Physical exam  Gen : Obese, alert x oriented x3  Heart S1S2 RRR  Lung decreased BS B/L  Ext b/l 1+ edema  abdomen - soft distended non tender                          14.8   10.41 )-----------( 342      ( 30 Apr 2020 08:25 )             46.1   04-30    139  |  97<L>  |  60.0<H>  ----------------------------<  123<H>  4.2   |  30.0<H>  |  1.06    Ca    9.5      30 Apr 2020 08:25  Phos  3.7     04-30  Mg     2.3     04-30    PT/INR - ( 30 Apr 2020 08:25 )   PT: 52.1 sec;   INR: 4.40 ratio

## 2020-05-02 NOTE — PROGRESS NOTE ADULT - ASSESSMENT
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 Pt initially placed on IV lasix, and steroids, showing much improvement. PT eval ordered. Course complicated by persistently elevated INR despite coumadin being held. Hematology consulted, and suspecting vitamin k deficiency.  S/P vitamin K PO x2. INR subtherapuetic. Will bridge to warfarin in 24 hours. Placed on full dose Lovenox as pt is high risk for clot as per heme/onc. Overall clinically improved. PT eval recommending home with assist.     INR instability due to suspected malnutrition/vitamin K deficiency, now under therapeutic levels, s/p vitamin K  - Pts INR on admission was 3.15  - Now under therapeutic levels, currently @ 1.18  - Hematology consulted and appreciated. S/P PO vitamin K x 2. Will start coumadin tomorrow   - Placed on full dose lovenox as pt is high risk for clots     Acute on chronic hypoxemic respiratory failure due to RUL PNA and decompensated CHF  -Pt shows clinical improvement. Decreased LE edema but continues with bilateral rales.   -c/w with monitor and    -s/p 2 doses of azithro and rocephin. Completed 7 days of Zosyn  -IV Lasix 40mg for diuresis deescalated to 40mg daily   -Increased O2 requirements from 2 to 4L   -Wean O2 back to baseline requirements    Acute decompensation CHF in setting of medication non-compliance  -Pt missed 2 days of PO Lasix prior to admission  -TTE showed EF of 45-55 % with reduced RV function and moderate Aortic stenosis.   -lasix switched to PO.   -1L fluid restriction increased to 1200 ml  -Cardiology eval ([place] consulted)    Hypomagnesemia   -Admission Mg 1.4, repleted in ED   -Monitor BMP and replete accordingly     Afib  -On coumadin, admission INR supratherapeutic at 3.58. Currently being held  - INR now infratherapeutic levels, will switch to lovenox ppx dose as per heme/onc  -Atenolol resumed    HTN  -On atenolol and lisinopril    COPD exacerbation  - Pt has been wheezing since admission  - Solumedrol 60 mg q 8. de-escalated to 40 mg daily     Depression  -Fluoxetine resumed    Overactive bladder  -Oxybutynin resumed    Prophylactic measure  -On coumadin but currently being held, due to supratherapuetic INR.  - On full dose lovenox.    DISPO: Pt is DNR/DNI. Molst form signed and in chart. 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 Pt initially placed on IV lasix, and steroids, showing much improvement. PT eval ordered. Course complicated by persistently elevated INR despite coumadin being held. Hematology consulted, and suspecting vitamin k deficiency.  S/P vitamin K PO x2. INR subtherapuetic. Will bridge to warfarin in 24 hours. Placed on full dose Lovenox as pt is high risk for clot as per heme/onc. Overall clinically improved. PT eval recommending home with assist.     INR instability due to suspected malnutrition/vitamin K deficiency, now under therapeutic levels, s/p vitamin K  - Pts INR on admission was 3.15  - Now under therapeutic levels, currently @ 1.18  - Hematology consulted and appreciated. S/P PO vitamin K x 2. Will start coumadin tomorrow   - Placed on full dose lovenox as pt is high risk for clots     Acute on chronic hypoxemic respiratory failure due to RUL PNA and decompensated CHF  -Pt shows clinical improvement. Decreased LE edema but continues with bilateral rales.   -c/w with monitor and    -s/p 2 doses of azithro and rocephin. Completed 7 days of Zosyn  -IV Lasix 40mg for diuresis deescalated to 40mg daily   -Initially required 4l NC, now deescalated to 2L.   -Wean O2 back to baseline requirements    Acute decompensation CHF in setting of medication non-compliance  -Pt missed 2 days of PO Lasix prior to admission  -TTE showed EF of 45-55 % with reduced RV function and moderate Aortic stenosis.   -lasix switched to PO.   -1L fluid restriction increased to 1200 ml  -Cardiology eval ([place] consulted)    Hypomagnesemia   -Admission Mg 1.4, repleted in ED   -Monitor BMP and replete accordingly     Afib  -On coumadin, admission INR supratherapeutic at 3.58. Currently being held  - INR now infratherapeutic levels, will switch to lovenox ppx dose as per heme/onc  -Atenolol resumed    HTN  -On atenolol and lisinopril    COPD exacerbation  - Pt has been wheezing since admission  - Solumedrol 60 mg q 8. de-escalated to 40 mg daily     Depression  -Fluoxetine resumed    Overactive bladder  -Oxybutynin resumed    Prophylactic measure  -On coumadin but currently being held, due to supratherapuetic INR.  - On full dose lovenox.    DISPO: Pt is DNR/DNI. Molst form signed and in chart.

## 2020-05-02 NOTE — PROGRESS NOTE ADULT - ASSESSMENT
86yoF extensive PMhx including COPD on 2L home O2, HFpEF (50-55%) Afib s/p ablation and PPM, on coumadin, prior TIA, LLE DVT and PE (2015) s/p IVC filter, JUAN non-compliant with CPAP who initially presented to ED with right sided chest pain, dyspnea found to have RUL PNA, then was d/c'ed to home and returned to ED the same day with worsening symptoms and increased O2 requirements, being admitted for acute on chronic hypoxemic respiratory failure due to PNA and decompensated CHF  COVID negative    hematology was consulted for persistent elevated INR despite holding coumadin.  spoke with daughter and patient both. Patient has h/o LLE DVT in 2015 and as per patient she had a PE too at that time, she had IVC filter placed and was started on coumadin. she also has h/o A fib which is another reason why she is on warfarin.  On 4/26 INR was 3.48, despite coumadin being held INR on 4/30 was 4.4.    1) Her supratherapeutic INR despite stopping coumadin is likely dietary/malabsorption related, another reason could be current medications/ABX interfering with coumadin clearance.  Recommend PO vitamin K 5 mg 2-3 days and monitor INR daily. there are no clinical signs of bleeding currently.    2) Regarding her h/o DVT/PE was back in 2015 and she had IVC filter placed. unsure if it was provoked/unprovoked. She is currently at high risk for recurrent DVT given she has h/o DVT, she is obese and bed ridden and there is still clinical suspicion of COVID.   Recommend giving vitamin k 5mg po today,check INR tomorrow and if going down start full dose Lovenox 1 mg/kg ass an alternative A/C method until her acute illness is recovered and she can take PO coumadin. If concern for excessive bleeding can also do Lovenox PPX 40 mg instead.INR now 1.118    3) CBC is stable.  Reviewed old records from office; last seen by me in 2015 for anemia, now resolved    will continue to follow.

## 2020-05-02 NOTE — PROGRESS NOTE ADULT - SUBJECTIVE AND OBJECTIVE BOX
YOSELYN PAULSON  86y, Female    Complaints: Acute on chronic hypoxemic respiratory failure due to PNA and decompensated CHF  Subjective: Pt seen and examined at bedside. Pt feels tired, but otherwise well. Requesting bedside commode as she feels she will not do well walking alone to the bathroom. Full dose lovenox today and restart warfarin tomorrow. No chest pain, no shortness of breath, no palpitations. ROS otherwise negative     Vital Signs Last 24 Hrs  T(C): 36.9 (02 May 2020 07:20), Max: 36.9 (02 May 2020 07:20)  T(F): 98.5 (02 May 2020 07:20), Max: 98.5 (02 May 2020 07:20)  HR: 66 (02 May 2020 07:20) (60 - 66)  BP: 121/76 (02 May 2020 07:20) (112/71 - 138/79)  RR: 20 (02 May 2020 07:20) (20 - 20)  SpO2: 96% (02 May 2020 07:20) (92% - 98%)    PHYSICAL EXAM:  General: obese, NAD calm and engaging   HEENT: neck is plethoric   CARDIOVASCULAR: Normal S1 and S2, 2/6 LOULOU,   EXT:B/L lower extremities, much improved.   NEURO: Alert/oriented x 3/normal motor exam.   PSYCH: normal affect.    LABS:                          15.1   14.04 )-----------( 397      ( 02 May 2020 06:09 )             47.5   05-02    142  |  96<L>  |  41.0<H>  ----------------------------<  98  4.2   |  35.0<H>  |  1.04    Ca    9.5      02 May 2020 06:09  Phos  2.7     05-02  Mg     1.8     05-02        MedsMEDICATIONS  (STANDING):  ascorbic acid 500 milliGRAM(s) Oral three times a day  ATENolol  Tablet 25 milliGRAM(s) Oral daily  atorvastatin 20 milliGRAM(s) Oral at bedtime  buDESOnide    Inhalation Suspension 0.5 milliGRAM(s) Inhalation daily  cholecalciferol 5000 Unit(s) Oral daily  enoxaparin Injectable 100 milliGRAM(s) SubCutaneous two times a day  ferrous    sulfate 325 milliGRAM(s) Oral daily  FLUoxetine 20 milliGRAM(s) Oral daily  furosemide    Tablet 40 milliGRAM(s) Oral daily  lisinopril 2.5 milliGRAM(s) Oral daily  multivitamin 1 Tablet(s) Oral daily  oxybutynin XL 10 milliGRAM(s) Oral daily  pantoprazole    Tablet 40 milliGRAM(s) Oral before breakfast  piperacillin/tazobactam IVPB.. 3.375 Gram(s) IV Intermittent every 8 hours  predniSONE   Tablet 40 milliGRAM(s) Oral daily  thiamine 200 milliGRAM(s) Oral <User Schedule>    MEDICATIONS  (PRN):  albuterol/ipratropium for Nebulization 3 milliLiter(s) Nebulizer every 6 hours PRN Shortness of Breath and/or Wheezing  fluticasone propionate 50 MICROgram(s)/spray Nasal Spray 1 Spray(s) Both Nostrils two times a day PRN Nasal Congestion      HEALTH ISSUES - PROBLEM Dx:

## 2020-05-03 ENCOUNTER — TRANSCRIPTION ENCOUNTER (OUTPATIENT)
Age: 85
End: 2020-05-03

## 2020-05-03 LAB
ANION GAP SERPL CALC-SCNC: 10 MMOL/L — SIGNIFICANT CHANGE UP (ref 5–17)
APTT BLD: 35.5 SEC — SIGNIFICANT CHANGE UP (ref 27.5–36.3)
BASOPHILS # BLD AUTO: 0.03 K/UL — SIGNIFICANT CHANGE UP (ref 0–0.2)
BASOPHILS NFR BLD AUTO: 0.2 % — SIGNIFICANT CHANGE UP (ref 0–2)
BUN SERPL-MCNC: 32 MG/DL — HIGH (ref 8–20)
CALCIUM SERPL-MCNC: 9.3 MG/DL — SIGNIFICANT CHANGE UP (ref 8.6–10.2)
CHLORIDE SERPL-SCNC: 94 MMOL/L — LOW (ref 98–107)
CO2 SERPL-SCNC: 38 MMOL/L — HIGH (ref 22–29)
CREAT SERPL-MCNC: 0.89 MG/DL — SIGNIFICANT CHANGE UP (ref 0.5–1.3)
EOSINOPHIL # BLD AUTO: 0.28 K/UL — SIGNIFICANT CHANGE UP (ref 0–0.5)
EOSINOPHIL NFR BLD AUTO: 1.7 % — SIGNIFICANT CHANGE UP (ref 0–6)
GLUCOSE SERPL-MCNC: 90 MG/DL — SIGNIFICANT CHANGE UP (ref 70–99)
HCT VFR BLD CALC: 47.1 % — HIGH (ref 34.5–45)
HGB BLD-MCNC: 14.9 G/DL — SIGNIFICANT CHANGE UP (ref 11.5–15.5)
IMM GRANULOCYTES NFR BLD AUTO: 14.7 % — HIGH (ref 0–1.5)
INR BLD: 1.19 RATIO — HIGH (ref 0.88–1.16)
LYMPHOCYTES # BLD AUTO: 1.93 K/UL — SIGNIFICANT CHANGE UP (ref 1–3.3)
LYMPHOCYTES # BLD AUTO: 11.9 % — LOW (ref 13–44)
MAGNESIUM SERPL-MCNC: 1.8 MG/DL — SIGNIFICANT CHANGE UP (ref 1.8–2.6)
MCHC RBC-ENTMCNC: 30.4 PG — SIGNIFICANT CHANGE UP (ref 27–34)
MCHC RBC-ENTMCNC: 31.6 GM/DL — LOW (ref 32–36)
MCV RBC AUTO: 96.1 FL — SIGNIFICANT CHANGE UP (ref 80–100)
MONOCYTES # BLD AUTO: 0.51 K/UL — SIGNIFICANT CHANGE UP (ref 0–0.9)
MONOCYTES NFR BLD AUTO: 3.1 % — SIGNIFICANT CHANGE UP (ref 2–14)
NEUTROPHILS # BLD AUTO: 11.14 K/UL — HIGH (ref 1.8–7.4)
NEUTROPHILS NFR BLD AUTO: 68.4 % — SIGNIFICANT CHANGE UP (ref 43–77)
PHOSPHATE SERPL-MCNC: 2.5 MG/DL — SIGNIFICANT CHANGE UP (ref 2.4–4.7)
PLATELET # BLD AUTO: 446 K/UL — HIGH (ref 150–400)
POTASSIUM SERPL-MCNC: 4.1 MMOL/L — SIGNIFICANT CHANGE UP (ref 3.5–5.3)
POTASSIUM SERPL-SCNC: 4.1 MMOL/L — SIGNIFICANT CHANGE UP (ref 3.5–5.3)
PROTHROM AB SERPL-ACNC: 13.5 SEC — HIGH (ref 10–12.9)
RBC # BLD: 4.9 M/UL — SIGNIFICANT CHANGE UP (ref 3.8–5.2)
RBC # FLD: 14.3 % — SIGNIFICANT CHANGE UP (ref 10.3–14.5)
SODIUM SERPL-SCNC: 142 MMOL/L — SIGNIFICANT CHANGE UP (ref 135–145)
WBC # BLD: 16.28 K/UL — HIGH (ref 3.8–10.5)
WBC # FLD AUTO: 16.28 K/UL — HIGH (ref 3.8–10.5)

## 2020-05-03 PROCEDURE — 99233 SBSQ HOSP IP/OBS HIGH 50: CPT | Mod: GC,25

## 2020-05-03 RX ORDER — PHYTONADIONE (VIT K1) 5 MG
5 TABLET ORAL ONCE
Refills: 0 | Status: COMPLETED | OUTPATIENT
Start: 2020-05-03 | End: 2020-05-03

## 2020-05-03 RX ADMIN — Medication 200 MILLIGRAM(S): at 05:17

## 2020-05-03 RX ADMIN — Medication 5 MILLIGRAM(S): at 08:56

## 2020-05-03 RX ADMIN — Medication 10 MILLIGRAM(S): at 13:23

## 2020-05-03 RX ADMIN — LISINOPRIL 2.5 MILLIGRAM(S): 2.5 TABLET ORAL at 05:17

## 2020-05-03 RX ADMIN — ATENOLOL 25 MILLIGRAM(S): 25 TABLET ORAL at 05:17

## 2020-05-03 RX ADMIN — Medication 200 MILLIGRAM(S): at 18:01

## 2020-05-03 RX ADMIN — ENOXAPARIN SODIUM 100 MILLIGRAM(S): 100 INJECTION SUBCUTANEOUS at 05:17

## 2020-05-03 RX ADMIN — Medication 500 MILLIGRAM(S): at 21:41

## 2020-05-03 RX ADMIN — Medication 20 MILLIGRAM(S): at 13:24

## 2020-05-03 RX ADMIN — Medication 40 MILLIGRAM(S): at 05:17

## 2020-05-03 RX ADMIN — Medication 1 TABLET(S): at 13:23

## 2020-05-03 RX ADMIN — ATORVASTATIN CALCIUM 20 MILLIGRAM(S): 80 TABLET, FILM COATED ORAL at 21:37

## 2020-05-03 RX ADMIN — Medication 325 MILLIGRAM(S): at 13:24

## 2020-05-03 RX ADMIN — ENOXAPARIN SODIUM 100 MILLIGRAM(S): 100 INJECTION SUBCUTANEOUS at 18:04

## 2020-05-03 RX ADMIN — Medication 500 MILLIGRAM(S): at 05:17

## 2020-05-03 RX ADMIN — Medication 5000 UNIT(S): at 13:24

## 2020-05-03 RX ADMIN — PANTOPRAZOLE SODIUM 40 MILLIGRAM(S): 20 TABLET, DELAYED RELEASE ORAL at 05:17

## 2020-05-03 RX ADMIN — Medication 500 MILLIGRAM(S): at 13:24

## 2020-05-03 NOTE — PROGRESS NOTE ADULT - SUBJECTIVE AND OBJECTIVE BOX
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  spoke with daughter and patient both. Patient has h/o LLE DVT in 2015 and as per patient she had a PE too at that time, she had IVC filter placed and was started on coumadin. she also has h/o A fib which is another reason why she is on warfarin.  On 4/26 INR was 3.48, despite coumadin being held INR on 4/30 was 4.4.    Patient seen at bedside . resting comfortably and in no acute distress. C/O breakdown on buttocks  Off Coumadin; INR 1.19; on Lovenox    ROS negative other than mentione din HPI    Physical exam  Gen : Obese, alert x oriented x3  Heart S1S2 RRR  Lung decreased BS B/L  Ext b/l 1+ edema  abdomen - soft distended non tender    Lab:  5/3/2020:  WBC 16.28  H/H 14.9/47.1, plt ct 446,000                          14.8   10.41 )-----------( 342      ( 30 Apr 2020 08:25 )             46.1   04-30    139  |  97<L>  |  60.0<H>  ----------------------------<  123<H>  4.2   |  30.0<H>  |  1.06    Ca    9.5      30 Apr 2020 08:25  Phos  3.7     04-30  Mg     2.3     04-30    PT/INR - ( 30 Apr 2020 08:25 )   PT: 52.1 sec;   INR: 4.40 ratio

## 2020-05-03 NOTE — DISCHARGE NOTE PROVIDER - CARE PROVIDERS DIRECT ADDRESSES
,DirectAddress_Unknown,[email or web address],DirectAddress_Unknown

## 2020-05-03 NOTE — DISCHARGE NOTE PROVIDER - CARE PROVIDER_API CALL
Israel Martinez)  Family Medicine  [address] [place] [address]  Phone: [phone]  Fax: [phone]  Follow Up Time:     Owen William (DO)  Critical Care Medicine; Internal Medicine; Pulmonary Disease  [address] [facility], [address]  [place] [address]  Phone: [phone]  Fax: [phone]  Follow Up Time:     Quentin Rolle)  Cardiovascular Disease; Internal Medicine  [address] [place] [address]  Phone: [phone]  Fax: [phone]  Follow Up Time:

## 2020-05-03 NOTE — DISCHARGE NOTE PROVIDER - NSDCFUSCHEDAPPT_GEN_ALL_CORE_FT
YOSELYN PAULSON ; 05/13/2020 ; [facility] [address] [facility] YOSELYN Greene ; 05/15/2020 ; [facility] [address] [facility]  YOSELYN PAULSON ; 07/08/2020 ; NPP Amy [address] [place] [address] YOSELYN PAULSON ; 05/13/2020 ; [facility] [address] [facility]  YOSELYN PAULSON ; 07/08/2020 ; NPDENNIS Reyes [address] [place] [address] YOSELYN PAULSON ; 05/13/2020 ; [facility] [address] [facility]  YOSELYN PAULSON ; 07/08/2020 ; NPDENNIS Reyes [address] [place] [address] YOSELYN PAULSON ; 05/13/2020 ; [facility] [address] [facility]  YOSELYN PAULSON ; 07/08/2020 ; NPDENNIS Reyes [address] [place] [address] YOSELYN PAULSON ; 05/13/2020 ; [facility] [address] [facility]  YOSELYN PAULSON ; 07/08/2020 ; NPDENNIS Reyes [address] [place] [address]

## 2020-05-03 NOTE — PROGRESS NOTE ADULT - ASSESSMENT
Imp:  Admitted with elevated PT/INR on Coumadin.    INR now normal; on Lovenox;  States she clotted thru Xarelto in past  Rec:  to reanticoagulate  Hematologically stable

## 2020-05-03 NOTE — DISCHARGE NOTE PROVIDER - HOSPITAL COURSE
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 Pt initially placed on IV lasix, and steroids, showing much improvement. PT eval ordered. Course complicated by persistently elevated INR despite coumadin being held. Hematology consulted, and suspecting vitamin k deficiency.  S/P vitamin K PO x3. INR subtherapuetic. Will need bridge to warfarin. Placed on full dose Lovenox as pt is high risk for clot as per heme/onc. Overall clinically improved. PT eval recommending home with assist.             Patient seen and examined @ the bedside today. Patient clinically stable at this time. No longer requires acute in hospital level of care. Can be continually followed/managed as an outpatient. Please see discharge summary for further information including today's vitals and physical exam.     All electrolyte abnormalities were monitored carefully and repleted as necessary during this hospitalization. At the time of discharge patient was hemodynamically stable and amenable to all terms of discharge. The patient has received verbal instructions from myself regarding discharge plans.         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 Pt initially placed on IV lasix, and steroids, showing much improvement. PT eval ordered. Course complicated by persistently elevated INR despite coumadin being held. Hematology consulted, and suspecting vitamin k deficiency.  S/P vitamin K PO x3. INR subtherapuetic. Started coumadin, INR slowly uptrending. Placed on full dose Lovenox as pt is high risk for clot as per heme/onc. Educated about lovenox. Overall clinically improved. PT eval recommending home with assist.         Patient seen and examined @ the bedside today. Patient clinically stable at this time. No longer requires acute in hospital level of care. Can be continually followed/managed as an outpatient. Please see discharge summary for further information including today's vitals and physical exam.     All electrolyte abnormalities were monitored carefully and repleted as necessary during this hospitalization. At the time of discharge patient was hemodynamically stable and amenable to all terms of discharge. The patient has received verbal instructions from myself regarding discharge plans.         Length of Discharge: 45MIN        Vital Signs Last 24 Hrs    T(C): 36.4 (08 May 2020 08:00), Max: 36.9 (07 May 2020 17:22)    T(F): 97.5 (08 May 2020 08:00), Max: 98.4 (07 May 2020 17:22)    HR: 61 (08 May 2020 08:00) (60 - 66)    BP: 111/70 (08 May 2020 08:00) (84/44 - 134/74)    RR: 18 (08 May 2020 08:00) (18 - 20)    SpO2: 96% (08 May 2020 08:00) (83% - 97%)        Physical Exam:    General: obese, NAD calm and engaging     HEENT: NC in place, clear sclera, no tongue deviation     CARDIOVASCULAR: Normal S1 and S2, 2/6 LOULOU    Pul: good air entry b/l, mild bi basilar crackles    EXT: warm, NT, no pedal edema    NEURO: Alert/oriented x 3/normal motor exam.     PSYCH: normal affect.    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 Pt initially placed on IV lasix, and steroids, showing much improvement transitioned to oral lasix. Plan to discharge home with lasix 40 and 20 mg, alternate every other day. Cardiology consulted and cleared for discharge. Pt's lisinopril decreased to 2.5mg on discharge due to low bp.        Hospital course complicated by persistently elevated INR despite coumadin being held. Hematology consulted, and suspecting vitamin K deficiency. S/P vitamin K PO x3. INR subtherapuetic. Started coumadin, INR uptrending, therapeutic on discharge. Intermittently placed on full dose Lovenox as pt is high risk for clot as per heme/onc. Educated about lovenox. Overall clinically improved. PT eval recommending home with assist.         Patient seen and examined @ the bedside today. Patient clinically stable at this time. No longer requires acute in hospital level of care. Can be continually followed/managed as an outpatient. Please see discharge summary for further information including today's vitals and physical exam.     All electrolyte abnormalities were monitored carefully and repleted as necessary during this hospitalization. At the time of discharge patient was hemodynamically stable and amenable to all terms of discharge. The patient has received verbal instructions from myself regarding discharge plans.         Length of Discharge: 45MIN        Vital Signs Last 24 Hrs    T(C): 36.4 (08 May 2020 08:00), Max: 36.9 (07 May 2020 17:22)    T(F): 97.5 (08 May 2020 08:00), Max: 98.4 (07 May 2020 17:22)    HR: 61 (08 May 2020 08:00) (60 - 66)    BP: 111/70 (08 May 2020 08:00) (84/44 - 134/74)    RR: 18 (08 May 2020 08:00) (18 - 20)    SpO2: 96% (08 May 2020 08:00) (83% - 97%)        Physical Exam:    General: obese, NAD calm and engaging     HEENT: NC in place, clear sclera, no tongue deviation     CARDIOVASCULAR: Normal S1 and S2, 2/6 LOULOU    Pul: good air entry b/l, mild bi basilar crackles    EXT: warm, NT, no pedal edema    NEURO: Alert/oriented x 3/normal motor exam.     PSYCH: normal affect.    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 Pt initially placed on IV lasix, and steroids, showing much improvement transitioned to oral lasix. Plan to discharge home with lasix 40 and 20 mg, alternate every other day. Cardiology consulted and cleared for discharge. Pt's lisinopril decreased to 2.5mg on discharge due to low bp.        Hospital course complicated by persistently elevated INR despite coumadin being held. Hematology consulted, and suspecting vitamin K deficiency. S/P vitamin K PO x3. INR subtherapuetic. Started coumadin, INR uptrending, therapeutic on discharge. Intermittently placed on full dose Lovenox as pt is high risk for clot as per heme/onc. Educated about lovenox. Overall clinically improved. PT eval recommending home with assist.         Patient seen and examined @ the bedside today. Patient clinically stable at this time. No longer requires acute in hospital level of care. Can be continually followed/managed as an outpatient. Please see discharge summary for further information including today's vitals and physical exam.     All electrolyte abnormalities were monitored carefully and repleted as necessary during this hospitalization. At the time of discharge patient was hemodynamically stable and amenable to all terms of discharge. The patient has received verbal instructions from myself regarding discharge plans.         Length of Discharge: 45MIN        Vital Signs Last 24 Hrs    T(C): 36.6 (10 May 2020 08:15), Max: 36.9 (09 May 2020 17:01)    T(F): 97.8 (10 May 2020 08:15), Max: 98.4 (09 May 2020 17:01)    HR: 63 (10 May 2020 08:15) (61 - 69)    BP: 114/69 (10 May 2020 08:15) (97/60 - 114/69)    RR: 19 (10 May 2020 08:15) (19 - 20)    SpO2: 96% (10 May 2020 08:15) (91% - 96%)        Physical Exam:    General: obese, NAD calm and engaging     HEENT: NC in place, clear sclera, no tongue deviation     CARDIOVASCULAR: Normal S1 and S2, 2/6 LOULOU    Pul: good air entry b/l, mild bi basilar crackles    EXT: warm, NT, no pedal edema    NEURO: Alert/oriented x 3/normal motor exam.     PSYCH: normal affect.    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 Pt initially placed on IV lasix, and steroids, showing much improvement transitioned to oral lasix. Plan to discharge home with lasix 40 and 20 mg, alternate every other day. Cardiology consulted and cleared for discharge. Pt's lisinopril decreased to 2.5mg on discharge due to low bp.        Hospital course complicated by persistently elevated INR despite coumadin being held. Hematology consulted, and suspecting vitamin K deficiency. S/P vitamin K PO x3. INR subtherapuetic. Started coumadin, INR uptrending, therapeutic on discharge (2.95). Intermittently placed on full dose Lovenox as pt is high risk for clot as per heme/onc. Educated about lovenox. Overall clinically improved. PT eval recommending home with assist.         Patient seen and examined @ the bedside today. Patient clinically stable at this time. No longer requires acute in hospital level of care. Can be continually followed/managed as an outpatient. Please see discharge summary for further information including today's vitals and physical exam.     All electrolyte abnormalities were monitored carefully and repleted as necessary during this hospitalization. At the time of discharge patient was hemodynamically stable and amenable to all terms of discharge. The patient has received verbal instructions from myself regarding discharge plans. Patient states she has an appointment with her primary care physician tomorrow.         Length of Discharge: 45MIN        Vital Signs Last 24 Hrs    T(C): 36.6 (10 May 2020 08:15), Max: 36.9 (09 May 2020 17:01)    T(F): 97.8 (10 May 2020 08:15), Max: 98.4 (09 May 2020 17:01)    HR: 63 (10 May 2020 08:15) (61 - 69)    BP: 114/69 (10 May 2020 08:15) (97/60 - 114/69)    RR: 19 (10 May 2020 08:15) (19 - 20)    SpO2: 96% (10 May 2020 08:15) (91% - 96%)        Physical Exam:    General: obese, NAD calm and engaging     HEENT: NC in place, clear sclera, no tongue deviation     CARDIOVASCULAR: Normal S1 and S2, 2/6 LOULOU    Pul: good air entry b/l, mild bi basilar crackles    EXT: warm, NT, no pedal edema    NEURO: Alert/oriented x 3/normal motor exam.     PSYCH: normal affect.    Skin: R elbow abrasion (covered without sign of active bleeding, R forearm 5 cm diameter oval in medial forearm (regressing), 1-2 cm diameter ecchymosis in lovenox puncture site

## 2020-05-03 NOTE — DISCHARGE NOTE PROVIDER - PROVIDER TOKENS
PROVIDER:[TOKEN:[[id number]]],PROVIDER:[TOKEN:[[id number]]],PROVIDER:[TOKEN:[[id number]]]

## 2020-05-03 NOTE — DISCHARGE NOTE PROVIDER - NSFOLLOWUPCLINICS_GEN_ALL_ED_FT
A Cardiologist  Cardiology  .  [place]   Phone:   Fax:   Follow Up Time: 2 weeks    A Family Medicine Doctor  Family Medicine  .  [place]   Phone:   Fax:   Follow Up Time: 2 weeks    A Pulmonologist  Pulmonary Medicine  .  [place]   Phone:   Fax:   Follow Up Time: 2 weeks

## 2020-05-03 NOTE — PROGRESS NOTE ADULT - SUBJECTIVE AND OBJECTIVE BOX
YOSELYN PAULSON  86y, Female    Complaints: Acute on chronic hypoxemic respiratory failure due to PNA and decompensated CHF  Subjective: Pt seen and examined at bedside. Pt states she is feeling better. Understands she needs her INR monitored for a few days and warfarin needs to be given accordingly. Pt otherwise had no chest pain, no shortness of breath, no palpitations, no nausea, vomiting. ROS otherwise negative.     Vital Signs Last 24 Hrs  T(C): 37 (03 May 2020 09:45), Max: 37 (03 May 2020 09:45)  T(F): 98.6 (03 May 2020 09:45), Max: 98.6 (03 May 2020 09:45)  HR: 63 (03 May 2020 09:45) (62 - 63)  BP: 119/63 (03 May 2020 09:45) (105/64 - 155/78)  RR: 18 (03 May 2020 09:45) (18 - 20)  SpO2: 97% (03 May 2020 09:45) (95% - 98%)    Physical Exam:  Due to the nature of this patient's COVID-19 isolation status, no bedside physical exam was done to limit spread of infection. Examination highlights were provided by bedside nurse. Objective data were reviewed in detail      LABS:                          14.9   16.28 )-----------( 446      ( 03 May 2020 06:49 )             47.1   05-03    142  |  94<L>  |  32.0<H>  ----------------------------<  90  4.1   |  38.0<H>  |  0.89    Ca    9.3      03 May 2020 06:49  Phos  2.5     05-03  Mg     1.8     05-03    MedsMEDICATIONS  (STANDING):  ascorbic acid 500 milliGRAM(s) Oral three times a day  ATENolol  Tablet 25 milliGRAM(s) Oral daily  atorvastatin 20 milliGRAM(s) Oral at bedtime  buDESOnide    Inhalation Suspension 0.5 milliGRAM(s) Inhalation daily  cholecalciferol 5000 Unit(s) Oral daily  enoxaparin Injectable 100 milliGRAM(s) SubCutaneous two times a day  ferrous    sulfate 325 milliGRAM(s) Oral daily  FLUoxetine 20 milliGRAM(s) Oral daily  furosemide    Tablet 40 milliGRAM(s) Oral daily  lisinopril 2.5 milliGRAM(s) Oral daily  multivitamin 1 Tablet(s) Oral daily  oxybutynin XL 10 milliGRAM(s) Oral daily  pantoprazole    Tablet 40 milliGRAM(s) Oral before breakfast  predniSONE   Tablet 40 milliGRAM(s) Oral daily  thiamine 200 milliGRAM(s) Oral <User Schedule>    MEDICATIONS  (PRN):  albuterol/ipratropium for Nebulization 3 milliLiter(s) Nebulizer every 6 hours PRN Shortness of Breath and/or Wheezing  fluticasone propionate 50 MICROgram(s)/spray Nasal Spray 1 Spray(s) Both Nostrils two times a day PRN Nasal Congestion

## 2020-05-03 NOTE — DISCHARGE NOTE PROVIDER - NSDCMRMEDTOKEN_GEN_ALL_CORE_FT
atenolol 25 mg oral tablet: 1 tab(s) orally once a day  atorvastatin 20 mg oral tablet: 1 tab(s) orally once a day (at bedtime)  budesonide 0.5 mg/2 mL inhalation suspension: 2 milliliter(s) inhaled once a day  ferrous sulfate 325 mg (65 mg elemental iron) oral tablet: 1 tab(s) orally once a day  FLUoxetine 20 mg oral capsule: 1 cap(s) orally once a day  fluticasone 50 mcg/inh nasal spray: 1 spray(s) nasal once a day  furosemide: 40mg and 20 mg 1 tab(s) orally on alternating days  ipratropium-albuterol 0.5 mg-2.5 mg/3 mLinhalation solution: 3 milliliter(s) inhaled every 6 hours  lisinopril 2.5 mg oral tablet: 1 tab(s) orally once a day  omeprazole 20 mg oral delayed release capsule: 1 cap(s) orally once a day  oxybutynin 10 mg/24 hr oral tablet, extended release: 1 tab(s) orally once a day  Vitamin D3 5000 intl units (125 mcg) oral capsule: 1 cap(s) orally once a day  warfarin 4 mg oral tablet: 1 tab(s) orally once a day atenolol 25 mg oral tablet: 1 tab(s) orally once a day  atorvastatin 20 mg oral tablet: 1 tab(s) orally once a day (at bedtime)  budesonide 0.5 mg/2 mL inhalation suspension: 2 milliliter(s) inhaled once a day  ferrous sulfate 325 mg (65 mg elemental iron) oral tablet: 1 tab(s) orally once a day  FLUoxetine 20 mg oral capsule: 1 cap(s) orally once a day  fluticasone 50 mcg/inh nasal spray: 1 spray(s) nasal once a day  ipratropium-albuterol 0.5 mg-2.5 mg/3 mLinhalation solution: 3 milliliter(s) inhaled every 6 hours  Lasix 40 mg oral tablet: 1 tab(s) orally once a day  lisinopril 2.5 mg oral tablet: 1 tab(s) orally once a day  Lovenox 100 mg/mL injectable solution: 100 milligram(s) subcutaneously every 12 hours   Multiple Vitamins oral tablet: 1 tab(s) orally once a day  omeprazole 20 mg oral delayed release capsule: 1 cap(s) orally once a day  oxybutynin 10 mg/24 hr oral tablet, extended release: 1 tab(s) orally once a day  Vitamin D3 5000 intl units (125 mcg) oral capsule: 1 cap(s) orally once a day  warfarin 4 mg oral tablet: 2 tab(s) orally on 5/8/20  1 tab orally once a day thereafter atenolol 25 mg oral tablet: 1 tab(s) orally once a day  atorvastatin 20 mg oral tablet: 1 tab(s) orally once a day (at bedtime)  budesonide 0.5 mg/2 mL inhalation suspension: 2 milliliter(s) inhaled once a day  ferrous sulfate 325 mg (65 mg elemental iron) oral tablet: 1 tab(s) orally once a day  FLUoxetine 20 mg oral capsule: 1 cap(s) orally once a day  fluticasone 50 mcg/inh nasal spray: 1 spray(s) nasal once a day  ipratropium-albuterol 0.5 mg-2.5 mg/3 mLinhalation solution: 3 milliliter(s) inhaled every 6 hours  Lasix 20 mg oral tablet: Alternate with 40mg and 20mg every other day  lisinopril 2.5 mg oral tablet: 1 tab(s) orally once a day  Multiple Vitamins oral tablet: 1 tab(s) orally once a day  omeprazole 20 mg oral delayed release capsule: 1 cap(s) orally once a day  oxybutynin 10 mg/24 hr oral tablet, extended release: 1 tab(s) orally once a day  Vitamin D3 5000 intl units (125 mcg) oral capsule: 1 cap(s) orally once a day  warfarin 4 mg oral tablet: 1 tab(s) orally once a day

## 2020-05-03 NOTE — PROGRESS NOTE ADULT - ASSESSMENT
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 Pt initially placed on IV lasix, and steroids, showing much improvement. PT eval ordered. Course complicated by persistently elevated INR despite coumadin being held. Hematology consulted, and suspecting vitamin k deficiency.  S/P vitamin K PO x3. INR subtherapuetic. Will need bridge to warfarin. Placed on full dose Lovenox as pt is high risk for clot as per heme/onc. Overall clinically improved. PT eval recommending home with assist.     INR instability due to suspected malnutrition/vitamin K deficiency, now under therapeutic levels, s/p vitamin K  - Pts INR on admission was 3.15  - Now under therapeutic levels, currently @ 1.19  - Hematology consulted and appreciated. S/P PO vitamin K x 3. Will f/u INR in am   - Pt will need bridge to warfarin  - Placed on full dose lovenox as pt is high risk for clots     Acute on chronic hypoxemic respiratory failure due to RUL PNA and decompensated CHF  -Pt shows clinical improvement. Decreased LE edema but continues with bilateral rales.   -c/w with monitor and    -s/p 2 doses of azithro and rocephin. Completed 7 days of Zosyn  -IV Lasix 40mg for diuresis deescalated to 40mg daily   -Initially required 4l NC, now deescalated to 2L.   -Wean O2 back to baseline requirements    Acute decompensation CHF in setting of medication non-compliance  -Pt missed 2 days of PO Lasix prior to admission  -TTE showed EF of 45-55 % with reduced RV function and moderate Aortic stenosis.   -lasix switched to PO.   -1L fluid restriction increased to 1200 ml  -Cardiology eval ([place] consulted)    Hypomagnesemia   -Admission Mg 1.4, repleted in ED   -Monitor BMP and replete accordingly     Afib  -On coumadin, admission INR supratherapeutic at 3.58. Currently being held  - INR now infratherapeutic levels  - on  lovenox full dose as per heme/onc given high risk for clots   -Atenolol resumed    HTN  -On atenolol and lisinopril    COPD exacerbation  - Pt has been wheezing since admission  - Solumedrol 60 mg q 8. de-escalated to 40 mg daily     Depression  -Fluoxetine resumed    Overactive bladder  -Oxybutynin resumed    Prophylactic measure  -On coumadin but currently being held, due to supratherapuetic INR.  - On full dose lovenox.    DISPO: Pt is DNR/DNI. Molst form signed and in chart.

## 2020-05-03 NOTE — DISCHARGE NOTE PROVIDER - NSDCCPCAREPLAN_GEN_ALL_CORE_FT
PRINCIPAL DISCHARGE DIAGNOSIS  Diagnosis: Acute systolic congestive heart failure  Assessment and Plan of Treatment: You were admitted because of an exacerbation of your heart failure. You were given IV lasix and closely monitored your blood levels until your symptoms improved. You can continue your home dose lasix. Please follow up with your primary care doctor after discharge.      SECONDARY DISCHARGE DIAGNOSES  Diagnosis: History of DVT (deep vein thrombosis)  Assessment and Plan of Treatment: Given your history of DVT/PE you were placed on your full dose lovenox as your INR was very elevated when you were admitted and your coumadin was held. You were given Vitamin K to correct your INR and restarted on your warfarin. Please continue your current dose and follow up with your primary care doctor within 1 week of discharge.    Diagnosis: COPD exacerbation  Assessment and Plan of Treatment: You were found to have an exacerbation of your COPD likely due to your PNA and your CHF exacerbation. You were placed on IV steroids, which was deescalated to oral. Please follow up with your primary care doctor after discharge.    Diagnosis: Pneumonia of right upper lobe due to infectious organism  Assessment and Plan of Treatment: You were found to have a right upper lobe PNA. You were given a 7 day course of IV antibiotics. Please follow up with your primary care doctor after discharge.    Diagnosis: Hypoxia  Assessment and Plan of Treatment: PRINCIPAL DISCHARGE DIAGNOSIS  Diagnosis: Acute systolic congestive heart failure  Assessment and Plan of Treatment: You were admitted because of an exacerbation of your heart failure. Continue oral lasix 40 mg. You were given IV lasix and closely monitored your blood levels until your symptoms improved. You can continue your home dose lasix. Please follow up with your primary care doctor after discharge.      SECONDARY DISCHARGE DIAGNOSES  Diagnosis: Afib  Assessment and Plan of Treatment: Continue medication for rate/rhythm control. Continue warfarin and Lovenox. Please continue lovenox until your INR is therapeutic or your physician counsels to discontinue.    Diagnosis: History of DVT (deep vein thrombosis)  Assessment and Plan of Treatment: Given your history of DVT/PE you were placed on your full dose lovenox as your INR was very elevated when you were admitted and your coumadin was held. You were given Vitamin K to correct your INR and restarted on your warfarin. Please continue your current dose and follow up with your primary care doctor within 1 week of discharge.    Diagnosis: Pneumonia of right upper lobe due to infectious organism  Assessment and Plan of Treatment: You were found to have a right upper lobe PNA. You were given a 7 day course of IV antibiotics. Please follow up with your primary care doctor after discharge.    Diagnosis: COPD exacerbation  Assessment and Plan of Treatment: You were found to have an exacerbation of your COPD likely due to your PNA and your CHF exacerbation. You were placed on IV steroids, which was deescalated to oral. Please follow up with your primary care doctor after discharge. PRINCIPAL DISCHARGE DIAGNOSIS  Diagnosis: Acute systolic congestive heart failure  Assessment and Plan of Treatment: You were admitted because of an exacerbation of your heart failure. Continue oral lasix 20mg and 40mg, alternate every otherday. You were given IV lasix and closely monitored your blood levels until your symptoms improved. You can continue your home dose lasix. Please follow up with your primary care doctor after discharge.      SECONDARY DISCHARGE DIAGNOSES  Diagnosis: Afib  Assessment and Plan of Treatment: Continue medication for rate/rhythm control. Continue warfarin 4mg. Your INR is therapeutic. Please have repeat INR check on 5/11/20 at your primary care office after discharge.    Diagnosis: HTN (hypertension)  Assessment and Plan of Treatment: Your lisinopril was decreased to 2.5mg every day. Continue low salt. Follow up with your cardiologist and primary care physician    Diagnosis: History of DVT (deep vein thrombosis)  Assessment and Plan of Treatment: Given your history of DVT/PE you were placed on your full dose lovenox as your INR was very elevated when you were admitted and your coumadin was held. You were given Vitamin K to correct your INR and restarted on your warfarin. Please continue your current dose and follow up with your primary care doctor within 1 week of discharge.    Diagnosis: Pneumonia of right upper lobe due to infectious organism  Assessment and Plan of Treatment: You were found to have a right upper lobe PNA. You were given a 7 day course of IV antibiotics. Please follow up with your primary care doctor after discharge.    Diagnosis: COPD exacerbation  Assessment and Plan of Treatment: You were found to have an exacerbation of your COPD likely due to your PNA and your CHF exacerbation. You were placed on IV steroids, which was deescalated to oral. Please follow up with your primary care doctor after discharge. PRINCIPAL DISCHARGE DIAGNOSIS  Diagnosis: Acute systolic congestive heart failure  Assessment and Plan of Treatment: You were admitted because of an exacerbation of your heart failure. Continue oral lasix 20mg and 40mg, alternate every otherday. You were given IV lasix and closely monitored your blood levels until your symptoms improved. You can continue your home dose lasix. Please follow up with your primary care doctor after discharge.      SECONDARY DISCHARGE DIAGNOSES  Diagnosis: Afib  Assessment and Plan of Treatment: Continue medication for atenolol. Hold warfarin 4mg today and resume after your primary care appointment tomorrow. Your INR is therapeutic. Please have repeat INR check on 5/11/20 at your primary care office after discharge.    Diagnosis: HTN (hypertension)  Assessment and Plan of Treatment: Your lisinopril was decreased to 2.5mg every day. Continue low salt. Follow up with your cardiologist and primary care physician    Diagnosis: History of DVT (deep vein thrombosis)  Assessment and Plan of Treatment: Given your history of DVT/PE you were placed on your full dose lovenox as your INR was very elevated when you were admitted and your coumadin was held. You were given Vitamin K to correct your INR and restarted on your warfarin. Please continue your current dose and follow up with your primary care doctor within 1 week of discharge.    Diagnosis: Pneumonia of right upper lobe due to infectious organism  Assessment and Plan of Treatment: You were found to have a right upper lobe PNA. You were given a 7 day course of IV antibiotics. Please follow up with your primary care doctor after discharge.    Diagnosis: COPD exacerbation  Assessment and Plan of Treatment: You were found to have an exacerbation of your COPD likely due to your PNA and your CHF exacerbation. You were placed on IV steroids, which was deescalated to oral. Please follow up with your primary care doctor after discharge.

## 2020-05-03 NOTE — DISCHARGE NOTE PROVIDER - NSDCFUADDINST_GEN_ALL_CORE_FT
Large right upper lobe consolidation with an appearance typical of community acquired lobar pneumonia (C19V-3); follow-up is recommended in 1-2 months following treatment to ensure resolution.       FINDINGS: Overlying leads and left subclavian approach pacemaker obscuring the chest. Low lung volume. Again noted, right upper lobe consolidation. Bibasilar opacities, which may represent atelectasis. The cardiomediastinal silhouette is stable.     IMPRESSION:     No evidence of deep venous thrombosis in either lower extremity.    < end of copied text >

## 2020-05-04 LAB
ANION GAP SERPL CALC-SCNC: 10 MMOL/L — SIGNIFICANT CHANGE UP (ref 5–17)
APTT BLD: 40.8 SEC — HIGH (ref 27.5–36.3)
BUN SERPL-MCNC: 32 MG/DL — HIGH (ref 8–20)
CALCIUM SERPL-MCNC: 9.7 MG/DL — SIGNIFICANT CHANGE UP (ref 8.6–10.2)
CHLORIDE SERPL-SCNC: 92 MMOL/L — LOW (ref 98–107)
CO2 SERPL-SCNC: 35 MMOL/L — HIGH (ref 22–29)
CREAT SERPL-MCNC: 0.72 MG/DL — SIGNIFICANT CHANGE UP (ref 0.5–1.3)
GLUCOSE SERPL-MCNC: 88 MG/DL — SIGNIFICANT CHANGE UP (ref 70–99)
INR BLD: 1.13 RATIO — SIGNIFICANT CHANGE UP (ref 0.88–1.16)
POTASSIUM SERPL-MCNC: 4 MMOL/L — SIGNIFICANT CHANGE UP (ref 3.5–5.3)
POTASSIUM SERPL-SCNC: 4 MMOL/L — SIGNIFICANT CHANGE UP (ref 3.5–5.3)
PROTHROM AB SERPL-ACNC: 12.8 SEC — SIGNIFICANT CHANGE UP (ref 10–12.9)
SODIUM SERPL-SCNC: 137 MMOL/L — SIGNIFICANT CHANGE UP (ref 135–145)

## 2020-05-04 PROCEDURE — 99233 SBSQ HOSP IP/OBS HIGH 50: CPT | Mod: GC,25

## 2020-05-04 RX ORDER — WARFARIN SODIUM 2.5 MG/1
4 TABLET ORAL ONCE
Refills: 0 | Status: COMPLETED | OUTPATIENT
Start: 2020-05-04 | End: 2020-05-04

## 2020-05-04 RX ORDER — FUROSEMIDE 40 MG
40 TABLET ORAL DAILY
Refills: 0 | Status: DISCONTINUED | OUTPATIENT
Start: 2020-05-05 | End: 2020-05-09

## 2020-05-04 RX ADMIN — Medication 200 MILLIGRAM(S): at 05:34

## 2020-05-04 RX ADMIN — Medication 10 MILLIGRAM(S): at 12:02

## 2020-05-04 RX ADMIN — LISINOPRIL 2.5 MILLIGRAM(S): 2.5 TABLET ORAL at 05:33

## 2020-05-04 RX ADMIN — ATORVASTATIN CALCIUM 20 MILLIGRAM(S): 80 TABLET, FILM COATED ORAL at 21:13

## 2020-05-04 RX ADMIN — Medication 20 MILLIGRAM(S): at 12:02

## 2020-05-04 RX ADMIN — PANTOPRAZOLE SODIUM 40 MILLIGRAM(S): 20 TABLET, DELAYED RELEASE ORAL at 05:33

## 2020-05-04 RX ADMIN — Medication 5000 UNIT(S): at 12:02

## 2020-05-04 RX ADMIN — ATENOLOL 25 MILLIGRAM(S): 25 TABLET ORAL at 05:31

## 2020-05-04 RX ADMIN — ENOXAPARIN SODIUM 100 MILLIGRAM(S): 100 INJECTION SUBCUTANEOUS at 17:18

## 2020-05-04 RX ADMIN — Medication 325 MILLIGRAM(S): at 12:02

## 2020-05-04 RX ADMIN — Medication 500 MILLIGRAM(S): at 12:02

## 2020-05-04 RX ADMIN — Medication 500 MILLIGRAM(S): at 21:13

## 2020-05-04 RX ADMIN — Medication 40 MILLIGRAM(S): at 05:31

## 2020-05-04 RX ADMIN — Medication 1 TABLET(S): at 12:02

## 2020-05-04 RX ADMIN — WARFARIN SODIUM 4 MILLIGRAM(S): 2.5 TABLET ORAL at 21:13

## 2020-05-04 RX ADMIN — ENOXAPARIN SODIUM 100 MILLIGRAM(S): 100 INJECTION SUBCUTANEOUS at 05:29

## 2020-05-04 RX ADMIN — Medication 200 MILLIGRAM(S): at 17:19

## 2020-05-04 RX ADMIN — Medication 500 MILLIGRAM(S): at 05:31

## 2020-05-04 RX ADMIN — Medication 40 MILLIGRAM(S): at 05:34

## 2020-05-04 NOTE — PROGRESS NOTE ADULT - ASSESSMENT
Imp:  Admitted with elevated PT/INR on Coumadin.    INR now normal; on Lovenox;  States she clotted thru Xarelto in past  Rec:  to reanticoagulate on coumadin. She says that her INR is usually very well controlled on 4mg daily but always gets affected when she is on antibiotics.    Leukocytosis - likely reactive, steroid related    Hematologically stable

## 2020-05-04 NOTE — DIETITIAN INITIAL EVALUATION ADULT. - PERTINENT LABORATORY DATA
05-04 Na137 mmol/L Glu 88 mg/dL K+ 4.0 mmol/L Cr  0.72 mg/dL BUN 32.0 mg/dL<H> Phos n/a   Alb n/a   PAB n/a     n/a  HgbA1C

## 2020-05-04 NOTE — PROGRESS NOTE ADULT - SUBJECTIVE AND OBJECTIVE BOX
KHURRAM YOSELYN  86y, Female    Pt seen and examined at bedside. o/n no acute events. pt has no acute complaints. Understands she needs her INR monitored for a few days and warfarin needs to be given accordingly. Pt otherwise had no chest pain, no shortness of breath, no palpitations, no nausea, vomiting. ROS otherwise negative.     Vital Signs Last 24 Hrs  T(C): 36.8 (04 May 2020 15:40), Max: 37.1 (04 May 2020 00:09)  T(F): 98.3 (04 May 2020 15:40), Max: 98.7 (04 May 2020 00:09)  HR: 63 (04 May 2020 15:40) (60 - 63)  BP: 110/71 (04 May 2020 15:40) (110/71 - 152/73)  RR: 19 (04 May 2020 15:40) (18 - 19)  SpO2: 95% (04 May 2020 15:40) (92% - 99%)    Physical Exam:  General: obese, NAD calm and engaging   HEENT: NC in place, clear sclera, no tongue deviation   CARDIOVASCULAR: Normal S1 and S2, 2/6 LOULOU  EXT: B/L lower extremities mild pedal edema  NEURO: Alert/oriented x 3/normal motor exam.   PSYCH: normal affect.    LABS:                        14.9   16.28 )-----------( 446      ( 03 May 2020 06:49 )             47.1     05-04    137  |  92<L>  |  32.0<H>  ----------------------------<  88  4.0   |  35.0<H>  |  0.72    Ca    9.7      04 May 2020 08:34  Phos  2.5     05-03  Mg     1.8     05-03         MEDICATIONS  (STANDING):  ascorbic acid 500 milliGRAM(s) Oral three times a day  ATENolol  Tablet 25 milliGRAM(s) Oral daily  atorvastatin 20 milliGRAM(s) Oral at bedtime  buDESOnide    Inhalation Suspension 0.5 milliGRAM(s) Inhalation daily  cholecalciferol 5000 Unit(s) Oral daily  enoxaparin Injectable 100 milliGRAM(s) SubCutaneous two times a day  ferrous    sulfate 325 milliGRAM(s) Oral daily  FLUoxetine 20 milliGRAM(s) Oral daily  furosemide    Tablet 40 milliGRAM(s) Oral daily  lisinopril 2.5 milliGRAM(s) Oral daily  multivitamin 1 Tablet(s) Oral daily  oxybutynin XL 10 milliGRAM(s) Oral daily  pantoprazole    Tablet 40 milliGRAM(s) Oral before breakfast  predniSONE   Tablet 40 milliGRAM(s) Oral daily  thiamine 200 milliGRAM(s) Oral <User Schedule>  warfarin 4 milliGRAM(s) Oral once    MEDICATIONS  (PRN):  albuterol/ipratropium for Nebulization 3 milliLiter(s) Nebulizer every 6 hours PRN Shortness of Breath and/or Wheezing  fluticasone propionate 50 MICROgram(s)/spray Nasal Spray 1 Spray(s) Both Nostrils two times a day PRN Nasal Congestion

## 2020-05-04 NOTE — PROGRESS NOTE ADULT - ASSESSMENT
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 Pt initially placed on IV lasix, and steroids, showing much improvement. PT eval ordered. Course complicated by persistently elevated INR despite coumadin being held. Hematology consulted, and suspecting vitamin k deficiency.  S/P vitamin K PO x3. INR subtherapuetic. Placed on full dose Lovenox as pt is high risk for clot as per heme/onc. Overall clinically improved. PT eval recommending home with assist.     INR instability due to suspected malnutrition/vitamin K deficiency, now under therapeutic levels, s/p vitamin K  - Pts INR on admission was 3.15  - Now under therapeutic levels, currently @ 1.13  - S/P PO vitamin K x 3   - started coumadin 4mg tonight, f/u INR in am  - continue lovenox bridging till INR therapeutic  - heme/onc consulted    Afib - CHADsVASC 5  -On coumadin, admission INR supratherapeutic at 3.58, most recent sub therapeutic - started lovenox  - INR now infratherapeutic levels  - on  lovenox full dose as per heme/onc given high risk for clots   -Atenolol resumed    Acute on chronic hypoxemic respiratory failure due to RUL PNA and decompensated CHF  -Pt shows clinical improvement. Decreased LE edema but continues with bilateral rales.   -c/w with monitor and    -s/p 2 doses of azithro and rocephin. Completed 7 days of Zosyn  -IV Lasix 40mg for diuresis deescalated to 40mg daily   -Initially required 4l NC, now deescalated to 2L.   -Wean O2 back to baseline requirements    Acute decompensation CHF in setting of medication non-compliance  -Pt missed 2 days of PO Lasix prior to admission  -TTE showed EF of 45-55 % with reduced RV function and moderate Aortic stenosis.   -c/w PO lasix w/ holding parameters   -1L fluid restriction increased to 1200 ml  -Cardiology eval ([place] consulted)    Hypomagnesemia - resolved  -Admission Mg 1.4, repleted in ED   -Monitor BMP and replete accordingly     HTN  -On atenolol and lisinopril    COPD exacerbation  - Pt has been wheezing since admission  - c/w prednisone 40    Depression  -Fluoxetine resumed    Overactive bladder  -Oxybutynin resumed    Prophylactic measure  -restarted coumadin  -On full dose lovenox    DISPO: likely home tomorrow. restarted coumadin. Pt is DNR/DNI. Molst form signed and in chart. 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 Pt initially placed on IV lasix, and steroids, showing much improvement. PT eval ordered. Course complicated by persistently elevated INR despite coumadin being held. Hematology consulted, and suspecting vitamin k deficiency.  S/P vitamin K PO x3. INR subtherapuetic. Placed on full dose Lovenox as pt is high risk for clot as per heme/onc. Overall clinically improved. PT eval recommending home with assist.     INR instability due to suspected malnutrition/vitamin K deficiency, now under therapeutic levels, s/p vitamin K  - Pts INR on admission was 3.15  - Now under therapeutic levels, currently @ 1.13  - S/P PO vitamin K x 3   - started coumadin 4mg tonight, f/u INR in am  - continue lovenox bridging until INR therapeutic  - heme/onc consulted    Afib - CHADsVASC 5  -On coumadin, admission INR supratherapeutic at 3.58, most recent sub therapeutic - started lovenox  - INR now subtherapeutic levels  - on  lovenox full dose as per heme/onc given high risk for clots   -Atenolol resumed    Acute on chronic hypoxemic respiratory failure due to RUL PNA and decompensated CHF  -Pt shows clinical improvement. Decreased LE edema, no longer has b/l rales  -c/w with monitor and    -s/p 2 doses of azithro and rocephin. Completed 7 days of Zosyn  -IV Lasix 40mg for diuresis deescalated to 40mg daily, switched to PO  -Initially required 4l NC, now deescalated to 2L.   -Wean O2 back to baseline requirements    Acute decompensation CHF in setting of medication non-compliance  -Pt missed 2 days of PO Lasix prior to admission  -TTE showed EF of 45-55 % with reduced RV function and moderate Aortic stenosis.   -c/w PO lasix w/ holding parameters   -1L fluid restriction increased to 1200 ml  -Cardiology eval ([place] consulted)    Hypomagnesemia - resolved  -Admission Mg 1.4, repleted in ED   -Monitor BMP and replete accordingly     HTN  -On atenolol and lisinopril    COPD exacerbation  - Pt has been wheezing since admission  - c/w prednisone 40    Depression  -Fluoxetine resumed    Overactive bladder  -Oxybutynin resumed    Prophylactic measure  -restarted coumadin  -On full dose lovenox    DISPO: likely home tomorrow. restarted coumadin. Pt is DNR/DNI. Molst form signed and in chart.

## 2020-05-04 NOTE — DIETITIAN INITIAL EVALUATION ADULT. - OTHER INFO
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

## 2020-05-04 NOTE — PROGRESS NOTE ADULT - SUBJECTIVE AND OBJECTIVE BOX
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  Patient has h/o LLE DVT in 2015 and as per patient she had a PE too at that time, she had IVC filter placed and was started on coumadin. she also has h/o A fib which is another reason why she is on warfarin.  On 4/26 INR was 3.48, despite coumadin being held INR on 4/30 was 4.4.    Patient seen at bedside . resting comfortably and in no acute distress. Off Coumadin  On Lovenox. No bleeding. She says that she feels much better.     ROS negative other than mentioned in HPI    Physical exam  Gen : Obese, alert x oriented x3  Heart S1S2 RRR  Lung decreased BS B/L  Ext b/l 1+ edema  abdomen - soft distended non tender    Lab:                          14.9   16.28 )-----------( 446      ( 03 May 2020 06:49 )             47.1         pTT     40.8  ----------< 1.13 INR/ 05-04-20 @ 08:34     12.8      PT          5/3/2020:  WBC 16.28  H/H 14.9/47.1, plt ct 446,000

## 2020-05-05 LAB
APTT BLD: 39.8 SEC — HIGH (ref 27.5–36.3)
BASOPHILS # BLD AUTO: 0.02 K/UL — SIGNIFICANT CHANGE UP (ref 0–0.2)
BASOPHILS NFR BLD AUTO: 0.1 % — SIGNIFICANT CHANGE UP (ref 0–2)
EOSINOPHIL # BLD AUTO: 0.28 K/UL — SIGNIFICANT CHANGE UP (ref 0–0.5)
EOSINOPHIL NFR BLD AUTO: 2 % — SIGNIFICANT CHANGE UP (ref 0–6)
HCT VFR BLD CALC: 47.1 % — HIGH (ref 34.5–45)
HGB BLD-MCNC: 15 G/DL — SIGNIFICANT CHANGE UP (ref 11.5–15.5)
IMM GRANULOCYTES NFR BLD AUTO: 16.9 % — HIGH (ref 0–1.5)
INR BLD: 1.06 RATIO — SIGNIFICANT CHANGE UP (ref 0.88–1.16)
LYMPHOCYTES # BLD AUTO: 1.21 K/UL — SIGNIFICANT CHANGE UP (ref 1–3.3)
LYMPHOCYTES # BLD AUTO: 8.8 % — LOW (ref 13–44)
MCHC RBC-ENTMCNC: 30.5 PG — SIGNIFICANT CHANGE UP (ref 27–34)
MCHC RBC-ENTMCNC: 31.8 GM/DL — LOW (ref 32–36)
MCV RBC AUTO: 95.7 FL — SIGNIFICANT CHANGE UP (ref 80–100)
MONOCYTES # BLD AUTO: 0.4 K/UL — SIGNIFICANT CHANGE UP (ref 0–0.9)
MONOCYTES NFR BLD AUTO: 2.9 % — SIGNIFICANT CHANGE UP (ref 2–14)
NEUTROPHILS # BLD AUTO: 9.52 K/UL — HIGH (ref 1.8–7.4)
NEUTROPHILS NFR BLD AUTO: 69.3 % — SIGNIFICANT CHANGE UP (ref 43–77)
PLATELET # BLD AUTO: 469 K/UL — HIGH (ref 150–400)
PROTHROM AB SERPL-ACNC: 12 SEC — SIGNIFICANT CHANGE UP (ref 10–12.9)
RBC # BLD: 4.92 M/UL — SIGNIFICANT CHANGE UP (ref 3.8–5.2)
RBC # FLD: 14.6 % — HIGH (ref 10.3–14.5)
WBC # BLD: 13.76 K/UL — HIGH (ref 3.8–10.5)
WBC # FLD AUTO: 13.76 K/UL — HIGH (ref 3.8–10.5)

## 2020-05-05 PROCEDURE — 99233 SBSQ HOSP IP/OBS HIGH 50: CPT

## 2020-05-05 RX ORDER — WARFARIN SODIUM 2.5 MG/1
4 TABLET ORAL ONCE
Refills: 0 | Status: COMPLETED | OUTPATIENT
Start: 2020-05-05 | End: 2020-05-05

## 2020-05-05 RX ORDER — ENOXAPARIN SODIUM 100 MG/ML
100 INJECTION SUBCUTANEOUS
Qty: 14 | Refills: 0
Start: 2020-05-05 | End: 2020-05-11

## 2020-05-05 RX ADMIN — Medication 325 MILLIGRAM(S): at 12:05

## 2020-05-05 RX ADMIN — Medication 500 MILLIGRAM(S): at 21:21

## 2020-05-05 RX ADMIN — Medication 200 MILLIGRAM(S): at 05:56

## 2020-05-05 RX ADMIN — Medication 40 MILLIGRAM(S): at 05:56

## 2020-05-05 RX ADMIN — WARFARIN SODIUM 4 MILLIGRAM(S): 2.5 TABLET ORAL at 21:21

## 2020-05-05 RX ADMIN — Medication 500 MILLIGRAM(S): at 12:05

## 2020-05-05 RX ADMIN — Medication 20 MILLIGRAM(S): at 12:05

## 2020-05-05 RX ADMIN — Medication 10 MILLIGRAM(S): at 12:05

## 2020-05-05 RX ADMIN — ENOXAPARIN SODIUM 100 MILLIGRAM(S): 100 INJECTION SUBCUTANEOUS at 05:57

## 2020-05-05 RX ADMIN — Medication 5000 UNIT(S): at 12:05

## 2020-05-05 RX ADMIN — Medication 1 TABLET(S): at 12:05

## 2020-05-05 RX ADMIN — ENOXAPARIN SODIUM 100 MILLIGRAM(S): 100 INJECTION SUBCUTANEOUS at 18:24

## 2020-05-05 RX ADMIN — Medication 40 MILLIGRAM(S): at 05:57

## 2020-05-05 RX ADMIN — ATORVASTATIN CALCIUM 20 MILLIGRAM(S): 80 TABLET, FILM COATED ORAL at 21:21

## 2020-05-05 RX ADMIN — Medication 30 MILLILITER(S): at 05:55

## 2020-05-05 RX ADMIN — Medication 500 MILLIGRAM(S): at 05:56

## 2020-05-05 RX ADMIN — Medication 200 MILLIGRAM(S): at 18:24

## 2020-05-05 RX ADMIN — ATENOLOL 25 MILLIGRAM(S): 25 TABLET ORAL at 05:56

## 2020-05-05 RX ADMIN — LISINOPRIL 2.5 MILLIGRAM(S): 2.5 TABLET ORAL at 05:56

## 2020-05-05 RX ADMIN — PANTOPRAZOLE SODIUM 40 MILLIGRAM(S): 20 TABLET, DELAYED RELEASE ORAL at 05:56

## 2020-05-05 NOTE — PROGRESS NOTE ADULT - SUBJECTIVE AND OBJECTIVE BOX
Pt seen and examined at bedside. O/n no acute events. pt has no acute complaints. Understands she needs her INR monitored for a few days and warfarin needs to be given accordingly. Pt is amenable to be discharged on lovenox, will need education. Pt otherwise had no chest pain, no shortness of breath, no palpitations, no nausea, vomiting.   ROS otherwise negative.     Monitor: Paced rhythym  : 96% on 2-3 L NC    Vital Signs Last 24 Hrs  T(C): 36.6 (05 May 2020 08:08), Max: 36.8 (04 May 2020 15:40)  T(F): 97.8 (05 May 2020 08:08), Max: 98.3 (04 May 2020 15:40)  HR: 63 (05 May 2020 08:08) (63 - 63)  BP: 147/70 (05 May 2020 08:08) (110/71 - 147/70)  RR: 18 (05 May 2020 08:08) (18 - 19)  SpO2: 97% (05 May 2020 08:08) (95% - 97%)    Physical Exam:  General: obese, NAD calm and engaging   HEENT: NC in place, clear sclera, no tongue deviation   CARDIOVASCULAR: Normal S1 and S2, 2/6 LOULOU  Pul: good air entry b/l, mild bi basilar crackles  EXT: warm, NT, no pedal edema  NEURO: Alert/oriented x 3/normal motor exam.   PSYCH: normal affect.    LABS:                        15.0   13.76 )-----------( 469      ( 05 May 2020 09:04 )             47.1     PT/INR - ( 05 May 2020 09:04 )   PT: 12.0 sec;   INR: 1.06 ratio    PTT - ( 05 May 2020 09:04 )  PTT:39.8 sec          MEDICATIONS  (STANDING):  ascorbic acid 500 milliGRAM(s) Oral three times a day  ATENolol  Tablet 25 milliGRAM(s) Oral daily  atorvastatin 20 milliGRAM(s) Oral at bedtime  buDESOnide    Inhalation Suspension 0.5 milliGRAM(s) Inhalation daily  cholecalciferol 5000 Unit(s) Oral daily  enoxaparin Injectable 100 milliGRAM(s) SubCutaneous two times a day  ferrous    sulfate 325 milliGRAM(s) Oral daily  FLUoxetine 20 milliGRAM(s) Oral daily  furosemide    Tablet 40 milliGRAM(s) Oral daily  lisinopril 2.5 milliGRAM(s) Oral daily  multivitamin 1 Tablet(s) Oral daily  oxybutynin XL 10 milliGRAM(s) Oral daily  pantoprazole    Tablet 40 milliGRAM(s) Oral before breakfast  predniSONE   Tablet 40 milliGRAM(s) Oral daily  thiamine 200 milliGRAM(s) Oral <User Schedule>  warfarin 4 milliGRAM(s) Oral once    MEDICATIONS  (PRN):  albuterol/ipratropium for Nebulization 3 milliLiter(s) Nebulizer every 6 hours PRN Shortness of Breath and/or Wheezing  fluticasone propionate 50 MICROgram(s)/spray Nasal Spray 1 Spray(s) Both Nostrils two times a day PRN Nasal Congestion

## 2020-05-05 NOTE — PROGRESS NOTE ADULT - ASSESSMENT
Imp:  Admitted with elevated PT/INR on Coumadin.    INR now normal; on Lovenox;  States she clotted thru Xarelto in past  Rec:  to reanticoagulate on coumadin. She says that her INR is usually very well controlled on 4mg daily but always gets affected when she is on antibiotics. has restarted coumadin    Leukocytosis - likely reactive, steroid related    Hematologically stable

## 2020-05-05 NOTE — PROGRESS NOTE ADULT - ASSESSMENT
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 Pt initially placed on IV lasix, and steroids, showing much improvement. PT eval ordered. Course complicated by persistently elevated INR despite coumadin being held. Hematology consulted, and suspecting vitamin k deficiency.  S/P vitamin K PO x3. INR subtherapuetic. Placed on full dose Lovenox as pt is high risk for clot as per heme/onc. Overall clinically improved. PT eval recommending home with assist.     INR instability due to suspected malnutrition/vitamin K deficiency, now under therapeutic levels, s/p vitamin K  - Pts INR on admission was 3.15  - Now under therapeutic levels, currently @ 1.09  - S/P PO vitamin K x 3   - ordered coumadin 4mg tonight, f/u INR in am  - continue lovenox bridging until INR therapeutic  - will educate patient about lovenox injection  - heme/onc consulted    Afib - CHADsVASC 5  -On coumadin, admission INR supratherapeutic at 3.58, most recent sub therapeutic - c/w lovenox  -INR now subtherapeutic levels  -on lovenox full dose as per heme/onc given high risk for clots   -Atenolol resumed    Acute on chronic hypoxemic respiratory failure due to RUL PNA and decompensated CHF  -Pt shows clinical improvement. Decreased LE edema, no longer has b/l rales  -c/w with monitor and    -s/p 2 doses of azithro and rocephin. Completed 7 days of Zosyn  -IV Lasix 40mg for diuresis deescalated to 40mg daily, switched to PO  -Initially required 4l NC, now deescalated to 2-3L.   -Wean O2 back to baseline requirements    Acute decompensation CHF in setting of medication non-compliance  -Pt missed 2 days of PO Lasix prior to admission  -TTE showed EF of 45-55 % with reduced RV function and moderate Aortic stenosis.   -c/w PO lasix w/ holding parameters   -1L fluid restriction increased to 1200 ml  -Cardiology eval ([place] consulted)    Hypomagnesemia - resolved  -Admission Mg 1.4, repleted in ED   -Monitor BMP and replete accordingly     HTN  -On atenolol and lisinopril    COPD exacerbation  - Pt has been wheezing since admission  - c/w prednisone 40    Depression  -Fluoxetine resumed    Overactive bladder  -Oxybutynin resumed    Prophylactic measure  -restarted coumadin  -On full dose lovenox    DISPO: likely home tomorrow. restarted coumadin. Pt is DNR/DNI. Molst form signed and in chart.

## 2020-05-05 NOTE — PROGRESS NOTE ADULT - SUBJECTIVE AND OBJECTIVE BOX
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  Patient has h/o LLE DVT in 2015 and as per patient she had a PE too at that time, she had IVC filter placed and was started on coumadin. she also has h/o A fib which is another reason why she is on warfarin.  On 4/26 INR was 3.48, despite coumadin being held INR on 4/30 was 4.4.      INTERVAL HISTORY     Patient seen at bedside . resting comfortably and in no acute distress. restarted coumadin  On Lovenox. No bleeding. She says that she feels much better.     ROS negative other than mentioned in HPI    MEDICATIONS  (STANDING):  ascorbic acid 500 milliGRAM(s) Oral three times a day  ATENolol  Tablet 25 milliGRAM(s) Oral daily  atorvastatin 20 milliGRAM(s) Oral at bedtime  buDESOnide    Inhalation Suspension 0.5 milliGRAM(s) Inhalation daily  cholecalciferol 5000 Unit(s) Oral daily  enoxaparin Injectable 100 milliGRAM(s) SubCutaneous two times a day  ferrous    sulfate 325 milliGRAM(s) Oral daily  FLUoxetine 20 milliGRAM(s) Oral daily  furosemide    Tablet 40 milliGRAM(s) Oral daily  lisinopril 2.5 milliGRAM(s) Oral daily  multivitamin 1 Tablet(s) Oral daily  oxybutynin XL 10 milliGRAM(s) Oral daily  pantoprazole    Tablet 40 milliGRAM(s) Oral before breakfast  predniSONE   Tablet 40 milliGRAM(s) Oral daily  thiamine 200 milliGRAM(s) Oral <User Schedule>      Physical exam  Vital Signs Last 24 Hrs  T(C): 36.6 (05 May 2020 08:08), Max: 36.8 (04 May 2020 15:40)  T(F): 97.8 (05 May 2020 08:08), Max: 98.3 (04 May 2020 15:40)  HR: 63 (05 May 2020 08:08) (63 - 63)  BP: 147/70 (05 May 2020 08:08) (110/71 - 147/70)  BP(mean): --  RR: 18 (05 May 2020 08:08) (18 - 19)  SpO2: 97% (05 May 2020 08:08) (95% - 97%)  Gen : Obese, alert x oriented x3  Heart S1S2 RRR  Lung decreased BS B/L  Ext b/l 1+ edema  abdomen - soft distended non tender    Lab:                    15.0                 x    | x    | x            13.76 >-----------< 469     ------------------------< x                     47.1                 x    | x    | x                                            Ca x     Mg x     Ph x

## 2020-05-06 LAB
BASOPHILS # BLD AUTO: 0.01 K/UL — SIGNIFICANT CHANGE UP (ref 0–0.2)
BASOPHILS NFR BLD AUTO: 0.1 % — SIGNIFICANT CHANGE UP (ref 0–2)
EOSINOPHIL # BLD AUTO: 0.24 K/UL — SIGNIFICANT CHANGE UP (ref 0–0.5)
EOSINOPHIL NFR BLD AUTO: 1.7 % — SIGNIFICANT CHANGE UP (ref 0–6)
HCT VFR BLD CALC: 47.7 % — HIGH (ref 34.5–45)
HGB BLD-MCNC: 15.3 G/DL — SIGNIFICANT CHANGE UP (ref 11.5–15.5)
IMM GRANULOCYTES NFR BLD AUTO: 14.9 % — HIGH (ref 0–1.5)
INR BLD: 1.17 RATIO — HIGH (ref 0.88–1.16)
LYMPHOCYTES # BLD AUTO: 1.7 K/UL — SIGNIFICANT CHANGE UP (ref 1–3.3)
LYMPHOCYTES # BLD AUTO: 12.4 % — LOW (ref 13–44)
MCHC RBC-ENTMCNC: 30.5 PG — SIGNIFICANT CHANGE UP (ref 27–34)
MCHC RBC-ENTMCNC: 32.1 GM/DL — SIGNIFICANT CHANGE UP (ref 32–36)
MCV RBC AUTO: 95.2 FL — SIGNIFICANT CHANGE UP (ref 80–100)
MONOCYTES # BLD AUTO: 0.45 K/UL — SIGNIFICANT CHANGE UP (ref 0–0.9)
MONOCYTES NFR BLD AUTO: 3.3 % — SIGNIFICANT CHANGE UP (ref 2–14)
NEUTROPHILS # BLD AUTO: 9.28 K/UL — HIGH (ref 1.8–7.4)
NEUTROPHILS NFR BLD AUTO: 67.6 % — SIGNIFICANT CHANGE UP (ref 43–77)
PLATELET # BLD AUTO: 456 K/UL — HIGH (ref 150–400)
PROTHROM AB SERPL-ACNC: 13.3 SEC — HIGH (ref 10–12.9)
RBC # BLD: 5.01 M/UL — SIGNIFICANT CHANGE UP (ref 3.8–5.2)
RBC # FLD: 14.4 % — SIGNIFICANT CHANGE UP (ref 10.3–14.5)
WBC # BLD: 13.73 K/UL — HIGH (ref 3.8–10.5)
WBC # FLD AUTO: 13.73 K/UL — HIGH (ref 3.8–10.5)

## 2020-05-06 PROCEDURE — 99233 SBSQ HOSP IP/OBS HIGH 50: CPT

## 2020-05-06 RX ORDER — WARFARIN SODIUM 2.5 MG/1
4 TABLET ORAL
Refills: 0 | Status: DISCONTINUED | OUTPATIENT
Start: 2020-05-06 | End: 2020-05-06

## 2020-05-06 RX ORDER — WARFARIN SODIUM 2.5 MG/1
4 TABLET ORAL AT BEDTIME
Refills: 0 | Status: DISCONTINUED | OUTPATIENT
Start: 2020-05-06 | End: 2020-05-06

## 2020-05-06 RX ORDER — WARFARIN SODIUM 2.5 MG/1
4 TABLET ORAL ONCE
Refills: 0 | Status: COMPLETED | OUTPATIENT
Start: 2020-05-06 | End: 2020-05-06

## 2020-05-06 RX ORDER — LISINOPRIL 2.5 MG/1
5 TABLET ORAL DAILY
Refills: 0 | Status: DISCONTINUED | OUTPATIENT
Start: 2020-05-06 | End: 2020-05-06

## 2020-05-06 RX ORDER — LISINOPRIL 2.5 MG/1
2.5 TABLET ORAL ONCE
Refills: 0 | Status: COMPLETED | OUTPATIENT
Start: 2020-05-06 | End: 2020-05-06

## 2020-05-06 RX ORDER — LISINOPRIL 2.5 MG/1
5 TABLET ORAL DAILY
Refills: 0 | Status: DISCONTINUED | OUTPATIENT
Start: 2020-05-07 | End: 2020-05-09

## 2020-05-06 RX ADMIN — ENOXAPARIN SODIUM 100 MILLIGRAM(S): 100 INJECTION SUBCUTANEOUS at 17:08

## 2020-05-06 RX ADMIN — Medication 500 MILLIGRAM(S): at 14:45

## 2020-05-06 RX ADMIN — Medication 40 MILLIGRAM(S): at 06:03

## 2020-05-06 RX ADMIN — ENOXAPARIN SODIUM 100 MILLIGRAM(S): 100 INJECTION SUBCUTANEOUS at 06:02

## 2020-05-06 RX ADMIN — Medication 325 MILLIGRAM(S): at 12:10

## 2020-05-06 RX ADMIN — Medication 200 MILLIGRAM(S): at 17:08

## 2020-05-06 RX ADMIN — Medication 10 MILLIGRAM(S): at 12:10

## 2020-05-06 RX ADMIN — Medication 40 MILLIGRAM(S): at 06:02

## 2020-05-06 RX ADMIN — Medication 200 MILLIGRAM(S): at 06:03

## 2020-05-06 RX ADMIN — ATENOLOL 25 MILLIGRAM(S): 25 TABLET ORAL at 06:03

## 2020-05-06 RX ADMIN — Medication 1 TABLET(S): at 12:10

## 2020-05-06 RX ADMIN — PANTOPRAZOLE SODIUM 40 MILLIGRAM(S): 20 TABLET, DELAYED RELEASE ORAL at 06:02

## 2020-05-06 RX ADMIN — Medication 500 MILLIGRAM(S): at 21:27

## 2020-05-06 RX ADMIN — Medication 20 MILLIGRAM(S): at 12:10

## 2020-05-06 RX ADMIN — Medication 500 MILLIGRAM(S): at 06:03

## 2020-05-06 RX ADMIN — ATORVASTATIN CALCIUM 20 MILLIGRAM(S): 80 TABLET, FILM COATED ORAL at 21:27

## 2020-05-06 RX ADMIN — Medication 5000 UNIT(S): at 12:11

## 2020-05-06 RX ADMIN — LISINOPRIL 2.5 MILLIGRAM(S): 2.5 TABLET ORAL at 12:11

## 2020-05-06 RX ADMIN — LISINOPRIL 2.5 MILLIGRAM(S): 2.5 TABLET ORAL at 06:03

## 2020-05-06 RX ADMIN — WARFARIN SODIUM 4 MILLIGRAM(S): 2.5 TABLET ORAL at 21:27

## 2020-05-06 NOTE — PROGRESS NOTE ADULT - SUBJECTIVE AND OBJECTIVE BOX
Pt seen and examined at bedside. O/n no acute events. pt has no acute complaints. Understands she needs her INR monitored for a few days and warfarin needs to be given accordingly. Pt is amenable to be discharged on lovenox, will need education. Pt otherwise had no chest pain, no shortness of breath, no palpitations, no nausea, vomiting.   ROS otherwise negative.     Monitor: Paced rhythym  : 96% on 2-3 L NC    Vital Signs Last 24 Hrs  T(C): 36.9 (06 May 2020 16:00), Max: 36.9 (06 May 2020 16:00)  T(F): 98.5 (06 May 2020 16:00), Max: 98.5 (06 May 2020 16:00)  HR: 63 (06 May 2020 16:00) (60 - 66)  BP: 100/60 (06 May 2020 16:00) (100/60 - 166/82)  RR: 18 (06 May 2020 16:00) (18 - 18)  SpO2: 94% (06 May 2020 16:00) (94% - 97%)    Physical Exam:  General: obese, NAD calm and engaging   HEENT: NC in place, clear sclera, no tongue deviation   CARDIOVASCULAR: Normal S1 and S2, 2/6 LOULOU  Pul: good air entry b/l, mild bi basilar crackles  EXT: warm, NT, no pedal edema  NEURO: Alert/oriented x 3/normal motor exam.   PSYCH: normal affect.  Skin: R elbow abrasion (covered without sign of active bleeding, R forearm 5 cm diameter oval in medial forearm (regressing), 1-2 cm diameter ecchymosis in lovenox puncture site    LABS:  CBC Full  -  ( 06 May 2020 08:25 )  WBC Count : 13.73 K/uL  RBC Count : 5.01 M/uL  Hemoglobin : 15.3 g/dL  Hematocrit : 47.7 %  Platelet Count - Automated : 456 K/uL  Mean Cell Volume : 95.2 fl  Mean Cell Hemoglobin : 30.5 pg  Mean Cell Hemoglobin Concentration : 32.1 gm/dL  Auto Neutrophil # : 9.28 K/uL  Auto Lymphocyte # : 1.70 K/uL  Auto Monocyte # : 0.45 K/uL  Auto Eosinophil # : 0.24 K/uL  Auto Basophil # : 0.01 K/uL  Auto Neutrophil % : 67.6 %  Auto Lymphocyte % : 12.4 %  Auto Monocyte % : 3.3 %  Auto Eosinophil % : 1.7 %  Auto Basophil % : 0.1 %    PT/INR - ( 06 May 2020 08:25 )   PT: 13.3 sec;   INR: 1.17 ratio    PTT - ( 05 May 2020 09:04 )  PTT:39.8 sec             MEDICATIONS  (STANDING):  ascorbic acid 500 milliGRAM(s) Oral three times a day  ATENolol  Tablet 25 milliGRAM(s) Oral daily  atorvastatin 20 milliGRAM(s) Oral at bedtime  buDESOnide    Inhalation Suspension 0.5 milliGRAM(s) Inhalation daily  cholecalciferol 5000 Unit(s) Oral daily  enoxaparin Injectable 100 milliGRAM(s) SubCutaneous two times a day  ferrous    sulfate 325 milliGRAM(s) Oral daily  FLUoxetine 20 milliGRAM(s) Oral daily  furosemide    Tablet 40 milliGRAM(s) Oral daily  multivitamin 1 Tablet(s) Oral daily  oxybutynin XL 10 milliGRAM(s) Oral daily  pantoprazole    Tablet 40 milliGRAM(s) Oral before breakfast  predniSONE   Tablet 40 milliGRAM(s) Oral daily  thiamine 200 milliGRAM(s) Oral <User Schedule>  warfarin 4 milliGRAM(s) Oral once    MEDICATIONS  (PRN):  albuterol/ipratropium for Nebulization 3 milliLiter(s) Nebulizer every 6 hours PRN Shortness of Breath and/or Wheezing  fluticasone propionate 50 MICROgram(s)/spray Nasal Spray 1 Spray(s) Both Nostrils two times a day PRN Nasal Congestion

## 2020-05-06 NOTE — PROGRESS NOTE ADULT - SUBJECTIVE AND OBJECTIVE BOX
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  Patient has h/o LLE DVT in 2015 and as per patient she had a PE too at that time, she had IVC filter placed and was started on coumadin. she also has h/o A fib which is another reason why she is on warfarin.  On 4/26 INR was 3.48, despite coumadin being held INR on 4/30 was 4.4.      INTERVAL HISTORY   events noted. no acute distress. restarted coumadin  No bleeding. She is doing well..     ROS negative other than mentioned in HPI    MEDICATIONS  (STANDING):  ascorbic acid 500 milliGRAM(s) Oral three times a day  ATENolol  Tablet 25 milliGRAM(s) Oral daily  atorvastatin 20 milliGRAM(s) Oral at bedtime  buDESOnide    Inhalation Suspension 0.5 milliGRAM(s) Inhalation daily  cholecalciferol 5000 Unit(s) Oral daily  enoxaparin Injectable 100 milliGRAM(s) SubCutaneous two times a day  ferrous    sulfate 325 milliGRAM(s) Oral daily  FLUoxetine 20 milliGRAM(s) Oral daily  furosemide    Tablet 40 milliGRAM(s) Oral daily  lisinopril 5 milliGRAM(s) Oral daily  multivitamin 1 Tablet(s) Oral daily  oxybutynin XL 10 milliGRAM(s) Oral daily  pantoprazole    Tablet 40 milliGRAM(s) Oral before breakfast  predniSONE   Tablet 40 milliGRAM(s) Oral daily  thiamine 200 milliGRAM(s) Oral <User Schedule>    MEDICATIONS  (PRN):  albuterol/ipratropium for Nebulization 3 milliLiter(s) Nebulizer every 6 hours PRN Shortness of Breath and/or Wheezing  fluticasone propionate 50 MICROgram(s)/spray Nasal Spray 1 Spray(s) Both Nostrils two times a day PRN Nasal Congestion        vitals noted, afebrile  Gen : Obese, alert x oriented x3  Heart S1S2 RRR  Lung decreased BS B/L  Ext b/l 1+ edema  abdomen - soft distended non tender

## 2020-05-06 NOTE — PROGRESS NOTE ADULT - ASSESSMENT
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 Pt initially placed on IV lasix, and steroids, showing much improvement. PT eval ordered. Course complicated by persistently elevated INR despite coumadin being held. Hematology consulted, and suspecting vitamin k deficiency.  S/P vitamin K PO x3. INR subtherapuetic. Placed on full dose Lovenox as pt is high risk for clot as per heme/onc. Overall clinically improved. PT eval recommending home with assist.     INR instability due to suspected malnutrition/vitamin K deficiency, now under therapeutic levels, s/p vitamin K  - Pts INR on admission was 3.15  - Now under therapeutic levels, currently @ 1.17  - S/P PO vitamin K x 3   - ordered coumadin 4mg tonight, f/u INR in am  - continue lovenox bridging until INR therapeutic  - will continue to educate patient about lovenox injection  - heme/onc consulted    Afib - CHADsVAS 5  -On coumadin, admission INR supratherapeutic at 3.58, most recent sub therapeutic - c/w lovenox  -INR now subtherapeutic levels  -on lovenox full dose as per heme/onc given high risk for clots   -Atenolol resumed    Acute on chronic hypoxemic respiratory failure due to RUL PNA and decompensated CHF  -Pt shows clinical improvement. Decreased LE edema, no longer has b/l rales  -c/w with monitor and    -s/p 2 doses of azithro and rocephin. Completed 7 days of Zosyn  -IV Lasix 40mg for diuresis deescalated to 40mg daily, switched to PO  -Initially required 4l NC, now deescalated to 2-3L.   -Wean O2 back to baseline requirements    Acute decompensation CHF in setting of medication non-compliance  -Pt missed 2 days of PO Lasix prior to admission  -TTE showed EF of 45-55 % with reduced RV function and moderate Aortic stenosis.   -c/w PO lasix w/ holding parameters   -1L fluid restriction increased to 1200 ml  -Cardiology eval ([place] consulted)    Hypomagnesemia - resolved  -Admission Mg 1.4, repleted in ED   -Monitor BMP and replete accordingly     HTN  -On atenolol and lisinopril    COPD exacerbation  - Pt has been wheezing since admission  - c/w prednisone 40    Depression  -Fluoxetine resumed    Overactive bladder  -Oxybutynin resumed    Prophylactic measure  -restarted coumadin  -On full dose lovenox    DISPO: likely home tomorrow. restarted coumadin. Pt is DNR/DNI. Molst form signed and in chart. 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 Pt initially placed on IV lasix, and steroids, showing much improvement. PT eval ordered. Course complicated by persistently elevated INR despite coumadin being held. Hematology consulted, and suspecting vitamin k deficiency.  S/P vitamin K PO x3. INR subtherapuetic. Placed on full dose Lovenox as pt is high risk for clot as per heme/onc. Overall clinically improved. PT eval recommending home with assist.     INR instability due to suspected malnutrition/vitamin K deficiency, now under therapeutic levels, s/p vitamin K  - Pts INR on admission was 3.15  - Now under therapeutic levels, currently @ 1.17  - S/P PO vitamin K x 3   - ordered coumadin 4mg tonight, f/u INR in am  - continue lovenox bridging until INR therapeutic  - will continue to educate patient about lovenox injection  - heme/onc consulted    Afib - CHADsVAS 5  -On coumadin, admission INR supratherapeutic at 3.58, most recent sub therapeutic - c/w lovenox  -INR now subtherapeutic levels  -on lovenox full dose as per heme/onc given high risk for clots   -Atenolol resumed    Acute on chronic hypoxemic respiratory failure due to RUL PNA and decompensated CHF  -Pt shows clinical improvement. Decreased LE edema, no longer has b/l rales  -c/w with monitor and    -s/p 2 doses of azithro and rocephin. Completed 7 days of Zosyn  -IV Lasix 40mg for diuresis deescalated to 40mg daily, switched to PO  -Initially required 4l NC, now deescalated to 2-3L.   -Wean O2 back to baseline requirements    Acute decompensation CHF in setting of medication non-compliance  -Pt missed 2 days of PO Lasix prior to admission  -TTE showed EF of 45-55 % with reduced RV function and moderate Aortic stenosis.   -c/w PO lasix w/ holding parameters   -1L fluid restriction increased to 1200 ml  -Cardiology eval ([place] consulted)    Hypomagnesemia - resolved  -Admission Mg 1.4, repleted in ED   -Monitor BMP and replete accordingly     HTN  -On atenolol and lisinopril    COPD exacerbation  - Pt has been wheezing since admission  - c/w prednisone 40    Depression  -Fluoxetine resumed    Overactive bladder  -Oxybutynin resumed    Prophylactic measure  -restarted coumadin  -On full dose lovenox  Hematology following.    DISPO: likely home tomorrow. restarted coumadin. Pt is DNR/DNI. Molst form signed and in chart.

## 2020-05-06 NOTE — PROGRESS NOTE ADULT - ASSESSMENT
Imp:  Admitted with elevated PT/INR on Coumadin.    INR now normal; on Lovenox; started coumadin  States she clotted thru Xarelto in past  Rec:  to reanticoagulate on coumadin. She says that her INR is usually very well controlled on 4mg daily but always gets affected when she is on antibiotics. has restarted coumadin    Leukocytosis - likely reactive, steroid related    Hematologically stable

## 2020-05-07 LAB
HCT VFR BLD CALC: 47.9 % — HIGH (ref 34.5–45)
HGB BLD-MCNC: 15.4 G/DL — SIGNIFICANT CHANGE UP (ref 11.5–15.5)
INR BLD: 1.18 RATIO — HIGH (ref 0.88–1.16)
PROTHROM AB SERPL-ACNC: 13.4 SEC — HIGH (ref 10–12.9)

## 2020-05-07 PROCEDURE — 99232 SBSQ HOSP IP/OBS MODERATE 35: CPT

## 2020-05-07 RX ORDER — WARFARIN SODIUM 2.5 MG/1
10 TABLET ORAL ONCE
Refills: 0 | Status: COMPLETED | OUTPATIENT
Start: 2020-05-07 | End: 2020-05-07

## 2020-05-07 RX ADMIN — Medication 500 MILLIGRAM(S): at 21:28

## 2020-05-07 RX ADMIN — ENOXAPARIN SODIUM 100 MILLIGRAM(S): 100 INJECTION SUBCUTANEOUS at 05:52

## 2020-05-07 RX ADMIN — Medication 200 MILLIGRAM(S): at 17:31

## 2020-05-07 RX ADMIN — PANTOPRAZOLE SODIUM 40 MILLIGRAM(S): 20 TABLET, DELAYED RELEASE ORAL at 05:53

## 2020-05-07 RX ADMIN — Medication 10 MILLIGRAM(S): at 11:33

## 2020-05-07 RX ADMIN — ATENOLOL 25 MILLIGRAM(S): 25 TABLET ORAL at 05:52

## 2020-05-07 RX ADMIN — ENOXAPARIN SODIUM 100 MILLIGRAM(S): 100 INJECTION SUBCUTANEOUS at 17:31

## 2020-05-07 RX ADMIN — Medication 1 TABLET(S): at 11:33

## 2020-05-07 RX ADMIN — Medication 40 MILLIGRAM(S): at 05:52

## 2020-05-07 RX ADMIN — Medication 200 MILLIGRAM(S): at 05:53

## 2020-05-07 RX ADMIN — LISINOPRIL 5 MILLIGRAM(S): 2.5 TABLET ORAL at 05:51

## 2020-05-07 RX ADMIN — Medication 5000 UNIT(S): at 11:34

## 2020-05-07 RX ADMIN — Medication 40 MILLIGRAM(S): at 05:51

## 2020-05-07 RX ADMIN — ATORVASTATIN CALCIUM 20 MILLIGRAM(S): 80 TABLET, FILM COATED ORAL at 21:28

## 2020-05-07 RX ADMIN — Medication 20 MILLIGRAM(S): at 11:33

## 2020-05-07 RX ADMIN — Medication 500 MILLIGRAM(S): at 11:33

## 2020-05-07 RX ADMIN — WARFARIN SODIUM 10 MILLIGRAM(S): 2.5 TABLET ORAL at 21:43

## 2020-05-07 RX ADMIN — Medication 325 MILLIGRAM(S): at 11:33

## 2020-05-07 RX ADMIN — Medication 500 MILLIGRAM(S): at 05:53

## 2020-05-07 NOTE — PROGRESS NOTE ADULT - ASSESSMENT
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 Pt initially placed on IV lasix, and steroids, showing much improvement. Hematology consulted, and suspecting vitamin k deficiency. S/P vitamin K PO x3. INR subtherapuetic. Placed on full dose Lovenox as pt is high risk for clot as per heme/onc, started on coumadin. Overall clinically improved. PT eval recommending home with assist.     INR instability due to suspected malnutrition/vitamin K deficiency, now under therapeutic levels, s/p vitamin K  - Pts INR on admission was 3.15  - Now under therapeutic levels, currently @ 1.18  - S/P PO vitamin K x 3   - ordered coumadin 10mg tonight, f/u INR in am  - continue lovenox bridging until INR therapeutic - to be discharged home with lovenox BID x 7 days.  - will continue to educate patient about lovenox injection  - heme/onc consulted    Afib - CHADsVAS 5  -On coumadin, admission INR supratherapeutic at 3.58, most recent sub therapeutic - c/w lovenox  -INR now subtherapeutic levels  -on lovenox full dose as per heme/onc given high risk for clots   -Atenolol resumed    Acute on chronic hypoxemic respiratory failure due to RUL PNA and decompensated CHF  -Pt shows clinical improvement. Decreased LE edema, no longer has b/l rales  -c/w with monitor and    -s/p 2 doses of azithro and rocephin. Completed 7 days of Zosyn  -IV Lasix 40mg for diuresis deescalated to 40mg daily, switched to PO  -Initially required 4l NC, now deescalated to 2-3L.   -Wean O2 back to baseline requirements    Acute decompensation CHF in setting of medication non-compliance  -Pt missed 2 days of PO Lasix prior to admission  -TTE showed EF of 45-55 % with reduced RV function and moderate Aortic stenosis.   -c/w PO lasix w/ holding parameters   -1L fluid restriction increased to 1200 ml  -Cardiology eval ([place] consulted)    Hypomagnesemia - resolved  -Admission Mg 1.4, repleted in ED   -Monitor BMP and replete accordingly     HTN  -On atenolol and lisinopril    COPD exacerbation  - Pt has been wheezing since admission  - c/w prednisone 40    Depression  -Fluoxetine resumed    Overactive bladder  -Oxybutynin resumed    Prophylactic measure  -restarted coumadin  -On full dose lovenox  Hematology following.    DISPO: likely home tomorrow. restarted coumadin. Pt is DNR/DNI. Molst form signed and in chart. 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 Pt initially placed on IV lasix, and steroids, showing much improvement. Hematology consulted, and suspecting vitamin k deficiency. S/P vitamin K PO x3. INR subtherapuetic. Placed on full dose Lovenox as pt is high risk for clot as per heme/onc, started on coumadin. Overall clinically improved. PT eval recommending home with assist.     INR instability due to suspected malnutrition/vitamin K deficiency, now under therapeutic levels, s/p vitamin K  - Pts INR on admission was 3.15  - Now under therapeutic levels, currently @ 1.18  - S/P PO vitamin K x 3   - ordered coumadin 10mg tonight, f/u INR in am  - continue lovenox bridging until INR therapeutic - to be discharged home with lovenox BID x 7 days.  - will continue to educate patient about lovenox injection  - heme/onc consulted    Afib - CHADsVAS 5  -On coumadin, admission INR supratherapeutic at 3.58, most recent sub therapeutic - c/w lovenox  -INR now subtherapeutic levels  -on lovenox full dose as per heme/onc given high risk for clots   -Atenolol resumed    Acute on chronic hypoxemic respiratory failure due to RUL PNA and decompensated CHF  -Pt shows clinical improvement. Decreased LE edema, no longer has b/l rales  -c/w with monitor and    -s/p 2 doses of azithro and rocephin. Completed 7 days of Zosyn  -IV Lasix 40mg for diuresis deescalated to 40mg daily, switched to PO  -Initially required 4l NC, now deescalated to 2-3L.   -Wean O2 back to baseline requirements    Acute decompensation CHF in setting of medication non-compliance  -Pt missed 2 days of PO Lasix prior to admission  -TTE showed EF of 45-55 % with reduced RV function and moderate Aortic stenosis.   -c/w PO lasix w/ holding parameters   -1L fluid restriction increased to 1200 ml  -Cardiology eval ([facility] [place])    Hypomagnesemia - resolved  -Admission Mg 1.4, repleted in ED   -Monitor BMP and replete accordingly     HTN  -On atenolol and lisinopril    COPD exacerbation  - Pt with wheezing on admission; s/p solumedrol  - s/p prednisone     Depression  -Fluoxetine resumed    Overactive bladder  -Oxybutynin resumed    Prophylactic measure  -restarted coumadin  -On full dose lovenox  Hematology following.    DISPO: likely home tomorrow. restarted coumadin. Pt is DNR/DNI. Molst form signed and in chart. 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 Pt initially placed on IV lasix, and steroids, showing much improvement. Hematology consulted, and suspecting vitamin k deficiency. S/P vitamin K PO x3. INR subtherapuetic. Placed on full dose Lovenox as pt is high risk for clot as per heme/onc, started on coumadin. Overall clinically improved. PT eval recommending home with assist.     INR instability due to suspected malnutrition/vitamin K deficiency, now under therapeutic levels, s/p vitamin K  - Pts INR on admission was 3.15  - Now under therapeutic levels, currently @ 1.18  - S/P PO vitamin K x 3   - ordered coumadin 10mg tonight, f/u INR in am  - continue lovenox bridging until INR therapeutic - to be discharged home with lovenox BID x 7 days.  - will continue to educate patient about lovenox injection  - heme/onc consulted    Afib - CHADsVAS 5  -On coumadin, admission INR supratherapeutic at 3.58, most recent sub therapeutic - c/w lovenox  -INR now subtherapeutic levels  -on lovenox full dose as per heme/onc given high risk for clots   BRIDGE to coumadin.    10 mgs po x 3 days and will Fu as OP with PCP.    Patient had DVT in the past when taking Xarelto and IVC filter was placed   -Atenolol resumed    Acute on chronic hypoxemic respiratory failure due to RUL PNA and decompensated CHF  -Pt shows clinical improvement. Decreased LE edema, no longer has b/l rales  -c/w with monitor and    -s/p 2 doses of azithro and rocephin. Completed 7 days of Zosyn  -IV Lasix 40mg for diuresis deescalated to 40mg daily, switched to PO  -Initially required 4l NC, now deescalated to 2-3L.   -Wean O2 back to baseline requirements    Acute decompensation CHF in setting of medication non-compliance  -Pt missed 2 days of PO Lasix prior to admission  -TTE showed EF of 45-55 % with reduced RV function and moderate Aortic stenosis.   -c/w PO lasix w/ holding parameters   -1L fluid restriction increased to 1200 ml  -Cardiology eval ([facility])    Hypomagnesemia - resolved  -Admission Mg 1.4, repleted in ED   -Monitor BMP and replete accordingly     HTN  -On atenolol and lisinopril    COPD exacerbation  - Pt with wheezing on admission; s/p solumedrol  - s/p prednisone     Depression  -Fluoxetine resumed    Overactive bladder  -Oxybutynin resumed    Prophylactic measure  -restarted coumadin  -On full dose lovenox  Hematology following.    DISPO: likely home tomorrow. restarted coumadin. Pt is DNR/DNI. Molst form signed and in chart.

## 2020-05-07 NOTE — PROGRESS NOTE ADULT - SUBJECTIVE AND OBJECTIVE BOX
Pt seen and examined at bedside. O/n no acute events. pt has no acute complaints. Understands she needs her INR monitored for a few days and warfarin needs to be given accordingly. Pt is amenable to be discharged on lovenox, educated by RN. Pt otherwise had no chest pain, no shortness of breath, no palpitations, no nausea, vomiting.   ROS otherwise negative.     Vital Signs Last 24 Hrs  T(C): 36.8 (07 May 2020 07:29), Max: 36.9 (06 May 2020 16:00)  T(F): 98.3 (07 May 2020 07:29), Max: 98.5 (06 May 2020 16:00)  HR: 66 (07 May 2020 07:29) (63 - 80)  BP: 126/78 (07 May 2020 07:29) (100/60 - 136/69)  RR: 18 (07 May 2020 07:29) (18 - 18)  SpO2: 98% (07 May 2020 07:29) (94% - 98%)    Physical Exam:  General: obese, NAD calm and engaging   HEENT: NC in place, clear sclera, no tongue deviation   CARDIOVASCULAR: Normal S1 and S2, 2/6 LOULOU  Pul: good air entry b/l, mild bi basilar crackles  EXT: warm, NT, no pedal edema  NEURO: Alert/oriented x 3/normal motor exam.   PSYCH: normal affect.  Skin: R elbow abrasion (covered without sign of active bleeding, R forearm 5 cm diameter oval in medial forearm (regressing), 1-2 cm diameter ecchymosis in lovenox puncture site    LABS:                        15.4   x     )-----------( x        ( 07 May 2020 06:36 )             47.9     MEDICATIONS  (STANDING):  ascorbic acid 500 milliGRAM(s) Oral three times a day  ATENolol  Tablet 25 milliGRAM(s) Oral daily  atorvastatin 20 milliGRAM(s) Oral at bedtime  buDESOnide    Inhalation Suspension 0.5 milliGRAM(s) Inhalation daily  cholecalciferol 5000 Unit(s) Oral daily  enoxaparin Injectable 100 milliGRAM(s) SubCutaneous two times a day  ferrous    sulfate 325 milliGRAM(s) Oral daily  FLUoxetine 20 milliGRAM(s) Oral daily  furosemide    Tablet 40 milliGRAM(s) Oral daily  lisinopril 5 milliGRAM(s) Oral daily  multivitamin 1 Tablet(s) Oral daily  oxybutynin XL 10 milliGRAM(s) Oral daily  pantoprazole    Tablet 40 milliGRAM(s) Oral before breakfast  predniSONE   Tablet 40 milliGRAM(s) Oral daily  thiamine 200 milliGRAM(s) Oral <User Schedule>  warfarin 10 milliGRAM(s) Oral once    MEDICATIONS  (PRN):  albuterol/ipratropium for Nebulization 3 milliLiter(s) Nebulizer every 6 hours PRN Shortness of Breath and/or Wheezing  fluticasone propionate 50 MICROgram(s)/spray Nasal Spray 1 Spray(s) Both Nostrils two times a day PRN Nasal Congestion

## 2020-05-08 LAB
APTT BLD: 46.4 SEC — HIGH (ref 27.5–36.3)
INR BLD: 1.49 RATIO — HIGH (ref 0.88–1.16)
PROTHROM AB SERPL-ACNC: 17.1 SEC — HIGH (ref 10–12.9)

## 2020-05-08 PROCEDURE — 99233 SBSQ HOSP IP/OBS HIGH 50: CPT

## 2020-05-08 RX ORDER — WARFARIN SODIUM 2.5 MG/1
10 TABLET ORAL ONCE
Refills: 0 | Status: COMPLETED | OUTPATIENT
Start: 2020-05-08 | End: 2020-05-08

## 2020-05-08 RX ORDER — ENOXAPARIN SODIUM 100 MG/ML
100 INJECTION SUBCUTANEOUS
Qty: 10 | Refills: 0
Start: 2020-05-08 | End: 2020-05-12

## 2020-05-08 RX ORDER — FUROSEMIDE 40 MG
1 TABLET ORAL
Qty: 30 | Refills: 0
Start: 2020-05-08 | End: 2020-06-06

## 2020-05-08 RX ORDER — WARFARIN SODIUM 2.5 MG/1
1 TABLET ORAL
Qty: 30 | Refills: 0
Start: 2020-05-08 | End: 2020-06-06

## 2020-05-08 RX ADMIN — Medication 40 MILLIGRAM(S): at 07:08

## 2020-05-08 RX ADMIN — PANTOPRAZOLE SODIUM 40 MILLIGRAM(S): 20 TABLET, DELAYED RELEASE ORAL at 07:07

## 2020-05-08 RX ADMIN — Medication 500 MILLIGRAM(S): at 22:36

## 2020-05-08 RX ADMIN — ATENOLOL 25 MILLIGRAM(S): 25 TABLET ORAL at 07:07

## 2020-05-08 RX ADMIN — ATORVASTATIN CALCIUM 20 MILLIGRAM(S): 80 TABLET, FILM COATED ORAL at 22:36

## 2020-05-08 RX ADMIN — Medication 1 TABLET(S): at 12:19

## 2020-05-08 RX ADMIN — WARFARIN SODIUM 10 MILLIGRAM(S): 2.5 TABLET ORAL at 22:35

## 2020-05-08 RX ADMIN — Medication 5000 UNIT(S): at 12:20

## 2020-05-08 RX ADMIN — LISINOPRIL 5 MILLIGRAM(S): 2.5 TABLET ORAL at 07:07

## 2020-05-08 RX ADMIN — Medication 0.5 MILLIGRAM(S): at 08:45

## 2020-05-08 RX ADMIN — ENOXAPARIN SODIUM 100 MILLIGRAM(S): 100 INJECTION SUBCUTANEOUS at 07:07

## 2020-05-08 RX ADMIN — Medication 20 MILLIGRAM(S): at 12:19

## 2020-05-08 RX ADMIN — Medication 200 MILLIGRAM(S): at 15:49

## 2020-05-08 RX ADMIN — Medication 500 MILLIGRAM(S): at 15:49

## 2020-05-08 RX ADMIN — Medication 325 MILLIGRAM(S): at 12:19

## 2020-05-08 RX ADMIN — Medication 200 MILLIGRAM(S): at 07:07

## 2020-05-08 RX ADMIN — Medication 500 MILLIGRAM(S): at 07:08

## 2020-05-08 RX ADMIN — ENOXAPARIN SODIUM 100 MILLIGRAM(S): 100 INJECTION SUBCUTANEOUS at 18:13

## 2020-05-08 RX ADMIN — Medication 10 MILLIGRAM(S): at 12:19

## 2020-05-08 NOTE — PROGRESS NOTE ADULT - SUBJECTIVE AND OBJECTIVE BOX
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  Patient has h/o LLE DVT in 2015 and as per patient she had a PE too at that time, she had IVC filter placed and was started on coumadin. she also has h/o A fib which is another reason why she is on warfarin.  On 4/26 INR was 3.48, despite coumadin being held INR on 4/30 was 4.4.      INTERVAL HISTORY   events noted. no acute distress. on coumadin/lovenox, INR 1.49  No bleeding. Did not sleep well.    ROS negative other than mentioned in HPI    MEDICATIONS  (STANDING):  ascorbic acid 500 milliGRAM(s) Oral three times a day  ATENolol  Tablet 25 milliGRAM(s) Oral daily  atorvastatin 20 milliGRAM(s) Oral at bedtime  buDESOnide    Inhalation Suspension 0.5 milliGRAM(s) Inhalation daily  cholecalciferol 5000 Unit(s) Oral daily  enoxaparin Injectable 100 milliGRAM(s) SubCutaneous two times a day  ferrous    sulfate 325 milliGRAM(s) Oral daily  FLUoxetine 20 milliGRAM(s) Oral daily  furosemide    Tablet 40 milliGRAM(s) Oral daily  lisinopril 5 milliGRAM(s) Oral daily  multivitamin 1 Tablet(s) Oral daily  oxybutynin XL 10 milliGRAM(s) Oral daily  pantoprazole    Tablet 40 milliGRAM(s) Oral before breakfast  thiamine 200 milliGRAM(s) Oral <User Schedule>    MEDICATIONS  (PRN):  albuterol/ipratropium for Nebulization 3 milliLiter(s) Nebulizer every 6 hours PRN Shortness of Breath and/or Wheezing  fluticasone propionate 50 MICROgram(s)/spray Nasal Spray 1 Spray(s) Both Nostrils two times a day PRN Nasal Congestion      Vital Signs Last 24 Hrs  T(C): 36.4 (08 May 2020 08:00), Max: 36.9 (07 May 2020 17:22)  T(F): 97.5 (08 May 2020 08:00), Max: 98.4 (07 May 2020 17:22)  HR: 61 (08 May 2020 08:00) (60 - 66)  BP: 111/70 (08 May 2020 08:00) (84/44 - 134/74)  BP(mean): --  RR: 18 (08 May 2020 08:00) (18 - 20)  SpO2: 96% (08 May 2020 08:00) (83% - 97%)  Gen : Obese, alert x oriented x3  Heart S1S2 RRR  Lung decreased BS B/L  Ext b/l 1+ edema  abdomen - soft distended non tender    Labs:                        15.4   x     )-----------( x        ( 07 May 2020 06:36 )             47.9

## 2020-05-08 NOTE — PROGRESS NOTE ADULT - ASSESSMENT
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 Pt initially placed on IV lasix, and steroids, showing much improvement. Hematology consulted, and suspecting vitamin k deficiency. S/P vitamin K PO x3. INR subtherapuetic. Placed on full dose Lovenox as pt is high risk for clot as per heme/onc, started on coumadin. Overall clinically improved. PT eval recommending home with assist. Plan to be discharged home, with INR check with PMD on Monday.    INR instability due to suspected malnutrition/vitamin K deficiency, now under therapeutic levels, s/p vitamin K  - Pts INR on admission was 3.15  - Now under therapeutic levels, currently @ 1.45  - S/P PO vitamin K x 3   - ordered coumadin 10mg tonight, f/u INR in am  - continue lovenox bridging until INR therapeutic - to be discharged home with lovenox till INR therapeutic  - will continue to educate patient about lovenox injection  - heme/onc consulted    Afib - CHADsVAS 5  -On coumadin, admission INR supratherapeutic at 3.58, most recent sub therapeutic - c/w lovenox  -INR now subtherapeutic levels  -on lovenox full dose as per heme/onc given high risk for clots   -BRIDGE to coumadin 10 mgs po x 3 days and will Fu as OP with PCP.   -Patient had DVT in the past when taking Xarelto and IVC filter was placed   -Atenolol resumed    Acute on chronic hypoxemic respiratory failure due to RUL PNA and decompensated CHF  -Pt shows clinical improvement. Decreased LE edema, no longer has b/l rales  -c/w with monitor and    -s/p 2 doses of azithro and rocephin. Completed 7 days of Zosyn  -IV Lasix 40mg for diuresis deescalated to 40mg daily, switched to PO  -Initially required 4l NC, now deescalated to 2-3L.   -Wean O2 back to baseline requirements    Acute decompensation CHF in setting of medication non-compliance  -Pt missed 2 days of PO Lasix prior to admission  -TTE showed EF of 45-55 % with reduced RV function and moderate Aortic stenosis.   -c/w PO lasix w/ holding parameters   -1L fluid restriction increased to 1200 ml  -Cardiology eval ([facility])    Hypomagnesemia - resolved  -Admission Mg 1.4, repleted in ED   -Monitor BMP and replete accordingly     HTN  -On atenolol and lisinopril    COPD exacerbation  - Pt with wheezing on admission; s/p solumedrol  - s/p prednisone     Depression  -Fluoxetine resumed    Overactive bladder  -Oxybutynin resumed    Prophylactic measure  -restarted coumadin  -On full dose lovenox  Hematology following.    DISPO: likely home tomorrow. restarted coumadin. Pt is DNR/DNI. Molst form signed and in chart.

## 2020-05-08 NOTE — PROGRESS NOTE ADULT - SUBJECTIVE AND OBJECTIVE BOX
Pt seen and examined at bedside. O/n no acute events. pt has no acute complaints. Understands she needs her INR monitored for a few days and warfarin needs to be given accordingly. Pt is amenable to be discharged on lovenox, educated by RN. Pt otherwise had no chest pain, no shortness of breath, no palpitations, no nausea, vomiting.     ROS otherwise negative.     Vital Signs Last 24 Hrs  T(C): 36.4 (08 May 2020 08:00), Max: 36.9 (07 May 2020 17:22)  T(F): 97.5 (08 May 2020 08:00), Max: 98.4 (07 May 2020 17:22)  HR: 61 (08 May 2020 08:00) (60 - 66)  BP: 111/70 (08 May 2020 08:00) (84/44 - 134/74)  RR: 18 (08 May 2020 08:00) (18 - 20)  SpO2: 96% (08 May 2020 08:00) (83% - 97%)    Physical Exam:  Gen: obese, NAD calm and engaging   HEENT: NC in place, clear sclera, no tongue deviation   CVS: Normal S1 and S2, 2/6 LOULOU  Pul: good air entry b/l, mild bi basilar crackles  EXT: warm, NT, no pedal edema  NEURO: Alert/oriented x 3/normal motor exam.   PSYCH: normal affect.  Skin: Rt elbow abrasion (covered without sign of active bleeding, R forearm 5 cm diameter oval in medial forearm (regressing), 1-2 cm diameter ecchymosis in lovenox puncture site    LABS:                                   15.4   x     )-----------( x        ( 07 May 2020 06:36 )             47.9     PT/INR - ( 08 May 2020 06:19 )   PT: 17.1 sec;   INR: 1.49 ratio         PTT - ( 08 May 2020 06:19 )  PTT:46.4 sec    MEDICATIONS  (STANDING):  ascorbic acid 500 milliGRAM(s) Oral three times a day  ATENolol  Tablet 25 milliGRAM(s) Oral daily  atorvastatin 20 milliGRAM(s) Oral at bedtime  buDESOnide    Inhalation Suspension 0.5 milliGRAM(s) Inhalation daily  cholecalciferol 5000 Unit(s) Oral daily  enoxaparin Injectable 100 milliGRAM(s) SubCutaneous two times a day  ferrous    sulfate 325 milliGRAM(s) Oral daily  FLUoxetine 20 milliGRAM(s) Oral daily  furosemide    Tablet 40 milliGRAM(s) Oral daily  lisinopril 5 milliGRAM(s) Oral daily  multivitamin 1 Tablet(s) Oral daily  oxybutynin XL 10 milliGRAM(s) Oral daily  pantoprazole    Tablet 40 milliGRAM(s) Oral before breakfast  thiamine 200 milliGRAM(s) Oral <User Schedule>  warfarin 10 milliGRAM(s) Oral once    MEDICATIONS  (PRN):  albuterol/ipratropium for Nebulization 3 milliLiter(s) Nebulizer every 6 hours PRN Shortness of Breath and/or Wheezing  fluticasone propionate 50 MICROgram(s)/spray Nasal Spray 1 Spray(s) Both Nostrils two times a day PRN Nasal Congestion Pt seen and examined at bedside. O/n no acute events. pt has no acute complaints. Understands she needs her INR monitored for a few days and warfarin needs to be given accordingly. Pt is amenable to be discharged on lovenox, educated by RN. Pt otherwise had no chest pain, no shortness of breath, no palpitations, no nausea, vomiting.   Hematology consulted on bridging of lovenox and coumadin and note reviewed and appreciated.      ROS otherwise negative.     Vital Signs Last 24 Hrs  T(C): 36.4 (08 May 2020 08:00), Max: 36.9 (07 May 2020 17:22)  T(F): 97.5 (08 May 2020 08:00), Max: 98.4 (07 May 2020 17:22)  HR: 61 (08 May 2020 08:00) (60 - 66)  BP: 111/70 (08 May 2020 08:00) (84/44 - 134/74)  RR: 18 (08 May 2020 08:00) (18 - 20)  SpO2: 96% (08 May 2020 08:00) (83% - 97%)    Physical Exam:  Gen: obese, NAD calm and engaging   HEENT: NC in place, clear sclera, no tongue deviation   CVS: Normal S1 and S2, 2/6 LOULOU  Pul: good air entry b/l, mild bi basilar crackles  EXT: warm, NT, no pedal edema  NEURO: Alert/oriented x 3/normal motor exam.   PSYCH: normal affect.  Skin: Rt elbow abrasion (covered without sign of active bleeding, R forearm 5 cm diameter oval in medial forearm (regressing), 1-2 cm diameter ecchymosis in lovenox puncture site    LABS:                                   15.4   x     )-----------( x        ( 07 May 2020 06:36 )             47.9     PT/INR - ( 08 May 2020 06:19 )   PT: 17.1 sec;   INR: 1.49 ratio         PTT - ( 08 May 2020 06:19 )  PTT:46.4 sec    MEDICATIONS  (STANDING):  ascorbic acid 500 milliGRAM(s) Oral three times a day  ATENolol  Tablet 25 milliGRAM(s) Oral daily  atorvastatin 20 milliGRAM(s) Oral at bedtime  buDESOnide    Inhalation Suspension 0.5 milliGRAM(s) Inhalation daily  cholecalciferol 5000 Unit(s) Oral daily  enoxaparin Injectable 100 milliGRAM(s) SubCutaneous two times a day  ferrous    sulfate 325 milliGRAM(s) Oral daily  FLUoxetine 20 milliGRAM(s) Oral daily  furosemide    Tablet 40 milliGRAM(s) Oral daily  lisinopril 5 milliGRAM(s) Oral daily  multivitamin 1 Tablet(s) Oral daily  oxybutynin XL 10 milliGRAM(s) Oral daily  pantoprazole    Tablet 40 milliGRAM(s) Oral before breakfast  thiamine 200 milliGRAM(s) Oral <User Schedule>  warfarin 10 milliGRAM(s) Oral once    MEDICATIONS  (PRN):  albuterol/ipratropium for Nebulization 3 milliLiter(s) Nebulizer every 6 hours PRN Shortness of Breath and/or Wheezing  fluticasone propionate 50 MICROgram(s)/spray Nasal Spray 1 Spray(s) Both Nostrils two times a day PRN Nasal Congestion

## 2020-05-08 NOTE — PROGRESS NOTE ADULT - ASSESSMENT
Imp:  Admitted with elevated PT/INR on Coumadin.    INR now normal; on Lovenox; started coumadin  States she clotted thru Xarelto in past  Rec:  to reanticoagulate on coumadin. She says that her INR is usually very well controlled on 4mg daily but always gets affected when she is on antibiotics. She has restarted coumadin bridging with lovenox.    Leukocytosis - likely reactive, steroid related    Hematologically stable

## 2020-05-09 LAB
INR BLD: 2.49 RATIO — HIGH (ref 0.88–1.16)
PROTHROM AB SERPL-ACNC: 28.9 SEC — HIGH (ref 10–12.9)

## 2020-05-09 PROCEDURE — 99233 SBSQ HOSP IP/OBS HIGH 50: CPT | Mod: GC,25

## 2020-05-09 RX ORDER — WARFARIN SODIUM 2.5 MG/1
1 TABLET ORAL
Qty: 30 | Refills: 0
Start: 2020-05-09 | End: 2020-06-07

## 2020-05-09 RX ORDER — LISINOPRIL 2.5 MG/1
1 TABLET ORAL
Qty: 30 | Refills: 0
Start: 2020-05-09 | End: 2020-06-07

## 2020-05-09 RX ORDER — WARFARIN SODIUM 2.5 MG/1
4 TABLET ORAL ONCE
Refills: 0 | Status: COMPLETED | OUTPATIENT
Start: 2020-05-09 | End: 2020-05-09

## 2020-05-09 RX ORDER — LISINOPRIL 2.5 MG/1
2.5 TABLET ORAL DAILY
Refills: 0 | Status: DISCONTINUED | OUTPATIENT
Start: 2020-05-10 | End: 2020-05-10

## 2020-05-09 RX ORDER — FUROSEMIDE 40 MG
1 TABLET ORAL
Qty: 30 | Refills: 0
Start: 2020-05-09 | End: 2020-06-07

## 2020-05-09 RX ORDER — FUROSEMIDE 40 MG
20 TABLET ORAL DAILY
Refills: 0 | Status: DISCONTINUED | OUTPATIENT
Start: 2020-05-10 | End: 2020-05-10

## 2020-05-09 RX ADMIN — ATORVASTATIN CALCIUM 20 MILLIGRAM(S): 80 TABLET, FILM COATED ORAL at 22:20

## 2020-05-09 RX ADMIN — LISINOPRIL 5 MILLIGRAM(S): 2.5 TABLET ORAL at 06:06

## 2020-05-09 RX ADMIN — PANTOPRAZOLE SODIUM 40 MILLIGRAM(S): 20 TABLET, DELAYED RELEASE ORAL at 06:06

## 2020-05-09 RX ADMIN — Medication 200 MILLIGRAM(S): at 17:12

## 2020-05-09 RX ADMIN — Medication 500 MILLIGRAM(S): at 13:35

## 2020-05-09 RX ADMIN — Medication 200 MILLIGRAM(S): at 06:06

## 2020-05-09 RX ADMIN — ENOXAPARIN SODIUM 100 MILLIGRAM(S): 100 INJECTION SUBCUTANEOUS at 17:12

## 2020-05-09 RX ADMIN — ENOXAPARIN SODIUM 100 MILLIGRAM(S): 100 INJECTION SUBCUTANEOUS at 06:06

## 2020-05-09 RX ADMIN — Medication 5000 UNIT(S): at 13:36

## 2020-05-09 RX ADMIN — Medication 500 MILLIGRAM(S): at 22:20

## 2020-05-09 RX ADMIN — WARFARIN SODIUM 4 MILLIGRAM(S): 2.5 TABLET ORAL at 22:20

## 2020-05-09 RX ADMIN — Medication 500 MILLIGRAM(S): at 06:06

## 2020-05-09 RX ADMIN — Medication 0.5 MILLIGRAM(S): at 09:59

## 2020-05-09 RX ADMIN — Medication 20 MILLIGRAM(S): at 13:35

## 2020-05-09 RX ADMIN — Medication 40 MILLIGRAM(S): at 06:06

## 2020-05-09 RX ADMIN — Medication 325 MILLIGRAM(S): at 13:35

## 2020-05-09 RX ADMIN — Medication 1 TABLET(S): at 13:35

## 2020-05-09 RX ADMIN — ATENOLOL 25 MILLIGRAM(S): 25 TABLET ORAL at 06:06

## 2020-05-09 RX ADMIN — Medication 10 MILLIGRAM(S): at 13:35

## 2020-05-09 NOTE — PROGRESS NOTE ADULT - REASON FOR ADMISSION
Acute on chronic hypoxemic respiratory failure due to PNA and decompensated CHF

## 2020-05-09 NOTE — PROGRESS NOTE ADULT - ATTENDING COMMENTS
Patient was seen and examined by me at bedside. I agree with resident's note, subjective, objective physical exam, assessment and plan with following modifications/additions.
Patient was seen and examined by me at bedside. I agree with resident's note, subjective, objective physical exam, assessment and plan with following modifications/additions.
Daughter Brinda was very concerned and rejected the plan for discharge and FU arranged as OP with her PCP with regard to INR and coumadin.  Became agitated and combative and threatened lawsuit to me and my practice as well as the hospital.   Also threatened same to resident MD when he called to advise of discharge.   I referred her to administration to further discuss.    Dr. SHASHI Doe advised by me.     I revisited patient at bedside and she reports feeling a bit weak and lightheaded while on commode.   VS with BP  116/68  HR  84  R 16    I advised she should stay  overnight and would reassess in AM.
Patient was seen and examined by me at bedside. I agree with resident's note, subjective, objective physical exam, assessment and plan with following modifications/additions.
Hematology consult reviewed and appreciated.

## 2020-05-09 NOTE — PROGRESS NOTE ADULT - SUBJECTIVE AND OBJECTIVE BOX
Pt seen and examined at bedside. O/n no acute events. pt in the AM had no acute complaints. In the PM, pt mentioned she felt a bit lightheaded. Patient states otherwise she feels better. Offers no acute complaints. Pt otherwise had no chest pain, no shortness of breath, no palpitations, no nausea, vomiting.     ROS otherwise negative.     Vital Signs Last 24 Hrs  T(C): 36.3 (09 May 2020 07:47), Max: 36.8 (08 May 2020 23:33)  T(F): 97.4 (09 May 2020 07:47), Max: 98.3 (08 May 2020 23:33)  HR: 60 (09 May 2020 07:47) (60 - 66)  BP: 90/57 (09 May 2020 07:47) (90/56 - 116/72)  RR: 18 (09 May 2020 07:47) (18 - 19)  SpO2: 97% (09 May 2020 07:47) (93% - 97%)    Physical Exam:  Gen: obese, NAD calm and engaging   HEENT: NC in place, clear sclera, no tongue deviation   CVS: Normal S1 and S2, 2/6 LOULOU  Pul: good air entry b/l, mild bi basilar crackles  EXT: warm, NT, no pedal edema  NEURO: Alert/oriented x 3/normal motor exam.   PSYCH: normal affect.  Skin: Rt elbow abrasion (covered without sign of active bleeding, R forearm 5 cm diameter oval in medial forearm (regressing), 1-2 cm diameter ecchymosis in lovenox puncture site    LABS:  PT/INR - ( 09 May 2020 09:38 )   PT: 28.9 sec;   INR: 2.49 ratio    PTT - ( 08 May 2020 06:19 )  PTT:46.4 sec               MEDICATIONS  (STANDING):  ascorbic acid 500 milliGRAM(s) Oral three times a day  ATENolol  Tablet 25 milliGRAM(s) Oral daily  atorvastatin 20 milliGRAM(s) Oral at bedtime  buDESOnide    Inhalation Suspension 0.5 milliGRAM(s) Inhalation daily  cholecalciferol 5000 Unit(s) Oral daily  enoxaparin Injectable 100 milliGRAM(s) SubCutaneous two times a day  ferrous    sulfate 325 milliGRAM(s) Oral daily  FLUoxetine 20 milliGRAM(s) Oral daily  multivitamin 1 Tablet(s) Oral daily  oxybutynin XL 10 milliGRAM(s) Oral daily  pantoprazole    Tablet 40 milliGRAM(s) Oral before breakfast  thiamine 200 milliGRAM(s) Oral <User Schedule>  warfarin 4 milliGRAM(s) Oral once    MEDICATIONS  (PRN):  albuterol/ipratropium for Nebulization 3 milliLiter(s) Nebulizer every 6 hours PRN Shortness of Breath and/or Wheezing  fluticasone propionate 50 MICROgram(s)/spray Nasal Spray 1 Spray(s) Both Nostrils two times a day PRN Nasal Congestion

## 2020-05-09 NOTE — PROGRESS NOTE ADULT - ASSESSMENT
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 Pt initially placed on IV lasix, and steroids, showing much improvement. Hematology consulted, and suspecting vitamin k deficiency. S/P vitamin K PO x3. INR subtherapuetic. Placed on full dose Lovenox as pt is high risk for clot as per heme/onc, started on coumadin. Overall clinically improved. PT eval recommending home with assist. Plan to be discharged home, with INR check with PMD on Monday.    INR instability due to suspected malnutrition/vitamin K deficiency, now therapeutic levels, s/p vitamin K  Pts INR on admission was 3.15, mos recent therapeutic  Now under therapeutic levels, currently @ 1.45  S/P PO vitamin K x 3   ordered coumadin 4 mg tonight, f/u INR in am  continue lovenox bridging until INR therapeutic - to be discharged home with lovenox till INR therapeutic  will continue to educate patient about lovenox injection  heme/onc consulted    Afib - [facility] 5  -On coumadin, admission INR supratherapeutic at 3.58, most recent sub therapeutic - c/w lovenox  -INR now subtherapeutic levels  -on lovenox full dose as per heme/onc given high risk for clots   -BRIDGE to coumadin 10 mgs po x 3 days and will Fu as OP with PCP.   -Patient had DVT in the past when taking Xarelto and IVC filter was placed   -Atenolol resumed    Acute on chronic hypoxemic respiratory failure due to RUL PNA and decompensated CHF  -Pt shows clinical improvement. Decreased LE edema, no longer has b/l rales  -c/w with monitor and    -s/p 2 doses of azithro and rocephin. Completed 7 days of Zosyn  -IV Lasix 40mg for diuresis deescalated to 40mg daily, switched to PO  -Initially required 4l NC, now deescalated to 2-3L.   -Wean O2 back to baseline requirements    Acute decompensation CHF in setting of medication non-compliance  -Pt missed 2 days of PO Lasix prior to admission  -TTE showed EF of 45-55 % with reduced RV function and moderate Aortic stenosis.   -c/w PO lasix w/ holding parameters   -1L fluid restriction increased to 1200 ml  -Cardiology eval ([place] consulted)    Hypomagnesemia - resolved  -Admission Mg 1.4, repleted in ED   -Monitor BMP and replete accordingly     HTN  -On atenolol and lisinopril    COPD exacerbation  - Pt with wheezing on admission; s/p solumedrol  - s/p prednisone     Depression  -Fluoxetine resumed    Overactive bladder  -Oxybutynin resumed    Prophylactic measure  -restarted coumadin  -On full dose lovenox  Hematology following.    DISPO: likely home tomorrow. restarted coumadin. Pt is DNR/DNI. Molst form signed and in chart. 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 Pt initially placed on IV lasix, and steroids, showing much improvement. Hematology consulted, and suspecting vitamin k deficiency. S/P vitamin K PO x3. INR subtherapuetic. Placed on full dose Lovenox as pt is high risk for clot as per heme/onc, started on coumadin. Overall clinically improved. PT eval recommending home with assist. Plan to be discharged home, with INR check with PMD on Monday.    INR instability due to suspected malnutrition/vitamin K deficiency, now therapeutic levels, s/p vitamin K  Pts INR on admission was 3.15, mos recent therapeutic  S/P PO vitamin K x 3  ordered coumadin 4 mg tonight (home dose), f/u INR in am  continue lovenox bridging until INR therapeutic - to be discharged home with lovenox till INR therapeutic  will continue to educate patient about lovenox injection  heme/onc consulted    Afib - [facility]  -On coumadin, admission INR supratherapeutic at 3.58, most recent therapeutic   -on lovenox full dose as per heme/onc given high risk for clots - last day today - INR therapeutic  -Patient had DVT in the past when taking Xarelto and IVC filter was placed   -c/w atenolol    Acute on chronic hypoxemic respiratory failure due to RUL PNA and decompensated CHF  -Pt shows clinical improvement. Decreased LE edema, no longer has b/l rales  -TTE showed EF of 45-55 % with reduced RV function and moderate Aortic stenosis.   -c/w with monitor and    -s/p 2 doses of azithro and rocephin. Completed 7 days of Zosyn  -s/p IV lasix  - c/w PO lasix - alternate with 40mg and 20mg every other day - due to low BP this AM  -c/w 2-3 L NC  -1L fluid restriction increased to 1200 ml  -Cardiology eval ([place] consulted)    Hypomagnesemia - resolved  -Admission Mg 1.4, repleted in ED   -Monitor BMP and replete accordingly     HTN  -On atenolol and lisinopril    COPD exacerbation  - Pt with wheezing on admission; s/p solumedrol  - s/p prednisone     Depression  -Fluoxetine resumed    Overactive bladder  -Oxybutynin resumed    Prophylactic measure  -c/w coumadin  -On full dose lovenox  Hematology following.    DISPO: likely home tomorrow. restarted coumadin. Pt is DNR/DNI. Molst form signed and in chart. 85 y/o F extensive PMHx including COPD on 2L home O2, HFpEF (50-55%) Afib s/p ablation and PPM, on coumadin, prior TIA, LLE DVT and PE (2014) s/p IVC filter, JUAN non-compliant with CPAP who initially presented to ED with right sided chest pain, dyspnea found to have RUL PNA, then was d/c'ed to home and returned to ED the same day with worsening symptoms and increased O2 requirements, being admitted for acute on chronic hypoxemic respiratory failure due to PNA and decompensated CHF. Pt initially placed on IV lasix, and steroids, showing much improvement. Hematology consulted, and suspecting vitamin k deficiency. S/P vitamin K PO x3. INR subtherapuetic. Placed on full dose Lovenox as pt is high risk for clot as per heme/onc, started on coumadin. Overall clinically improved. PT eval recommending home with assist. Plan to be discharged home, with INR check with PMD on Monday.    INR instability due to suspected malnutrition/vitamin K deficiency, now therapeutic levels, s/p vitamin K  Pts INR on admission was 3.15, mos recent therapeutic  S/P PO vitamin K x 3  ordered coumadin 4 mg tonight (home dose), f/u INR in am  continue lovenox bridging until INR therapeutic - to be discharged home with lovenox till INR therapeutic  will continue to educate patient about lovenox injection  heme/onc consulted    [facility] - [facility]  -On coumadin, admission INR supratherapeutic at 3.58, most recent therapeutic   -on lovenox full dose as per heme/onc given high risk for clots - last day today - INR therapeutic  -Patient had DVT in the past when taking Xarelto and IVC filter was placed   -c/w atenolol    Acute on chronic hypoxemic respiratory failure due to RUL PNA and decompensated CHF  -Pt shows clinical improvement. Decreased LE edema, no longer has b/l rales  -TTE showed EF of 45-55 % with reduced RV function and moderate Aortic stenosis.   -c/w with monitor and    -s/p 2 doses of azithro and rocephin. Completed 7 days of Zosyn  -s/p IV lasix  - c/w PO lasix - alternate with 40mg and 20mg every other day - due to low BP this AM  -c/w 2-3 L NC  -1L fluid restriction increased to 1200 ml  -Cardiology eval ([place] consulted)    Hypomagnesemia - resolved  -Admission Mg 1.4, repleted in ED   -Monitor BMP and replete accordingly     HTN  -On atenolol and lisinopril    COPD exacerbation  - Pt with wheezing on admission; s/p solumedrol  - s/p prednisone     Depression  -Fluoxetine resumed    Overactive bladder  -Oxybutynin resumed    Prophylactic measure  -c/w coumadin  -On full dose lovenox  Hematology following.    DISPO: likely home tomorrow. restarted coumadin. Pt is DNR/DNI. Molst form signed and in chart.

## 2020-05-10 VITALS — OXYGEN SATURATION: 96 %

## 2020-05-10 LAB
APTT BLD: 53.9 SEC — HIGH (ref 27.5–36.3)
INR BLD: 2.95 RATIO — HIGH (ref 0.88–1.16)
PROTHROM AB SERPL-ACNC: 34.5 SEC — HIGH (ref 10–12.9)

## 2020-05-10 PROCEDURE — 85730 THROMBOPLASTIN TIME PARTIAL: CPT

## 2020-05-10 PROCEDURE — 93306 TTE W/DOPPLER COMPLETE: CPT

## 2020-05-10 PROCEDURE — 96374 THER/PROPH/DIAG INJ IV PUSH: CPT

## 2020-05-10 PROCEDURE — 96375 TX/PRO/DX INJ NEW DRUG ADDON: CPT

## 2020-05-10 PROCEDURE — 80048 BASIC METABOLIC PNL TOTAL CA: CPT

## 2020-05-10 PROCEDURE — 97110 THERAPEUTIC EXERCISES: CPT

## 2020-05-10 PROCEDURE — 83690 ASSAY OF LIPASE: CPT

## 2020-05-10 PROCEDURE — 85014 HEMATOCRIT: CPT

## 2020-05-10 PROCEDURE — 82550 ASSAY OF CK (CPK): CPT

## 2020-05-10 PROCEDURE — 93005 ELECTROCARDIOGRAM TRACING: CPT

## 2020-05-10 PROCEDURE — 94640 AIRWAY INHALATION TREATMENT: CPT

## 2020-05-10 PROCEDURE — 85027 COMPLETE CBC AUTOMATED: CPT

## 2020-05-10 PROCEDURE — 83735 ASSAY OF MAGNESIUM: CPT

## 2020-05-10 PROCEDURE — 99284 EMERGENCY DEPT VISIT MOD MDM: CPT | Mod: 25

## 2020-05-10 PROCEDURE — 99239 HOSP IP/OBS DSCHRG MGMT >30: CPT | Mod: 25

## 2020-05-10 PROCEDURE — 84145 PROCALCITONIN (PCT): CPT

## 2020-05-10 PROCEDURE — 97163 PT EVAL HIGH COMPLEX 45 MIN: CPT

## 2020-05-10 PROCEDURE — 87635 SARS-COV-2 COVID-19 AMP PRB: CPT

## 2020-05-10 PROCEDURE — 71045 X-RAY EXAM CHEST 1 VIEW: CPT

## 2020-05-10 PROCEDURE — 86140 C-REACTIVE PROTEIN: CPT

## 2020-05-10 PROCEDURE — 71250 CT THORAX DX C-: CPT

## 2020-05-10 PROCEDURE — 85379 FIBRIN DEGRADATION QUANT: CPT

## 2020-05-10 PROCEDURE — 80053 COMPREHEN METABOLIC PANEL: CPT

## 2020-05-10 PROCEDURE — 85018 HEMOGLOBIN: CPT

## 2020-05-10 PROCEDURE — 83605 ASSAY OF LACTIC ACID: CPT

## 2020-05-10 PROCEDURE — 83880 ASSAY OF NATRIURETIC PEPTIDE: CPT

## 2020-05-10 PROCEDURE — 93970 EXTREMITY STUDY: CPT

## 2020-05-10 PROCEDURE — 82728 ASSAY OF FERRITIN: CPT

## 2020-05-10 PROCEDURE — 84484 ASSAY OF TROPONIN QUANT: CPT

## 2020-05-10 PROCEDURE — 97530 THERAPEUTIC ACTIVITIES: CPT

## 2020-05-10 PROCEDURE — 97116 GAIT TRAINING THERAPY: CPT

## 2020-05-10 PROCEDURE — 99285 EMERGENCY DEPT VISIT HI MDM: CPT | Mod: 25

## 2020-05-10 PROCEDURE — 87040 BLOOD CULTURE FOR BACTERIA: CPT

## 2020-05-10 PROCEDURE — 85610 PROTHROMBIN TIME: CPT

## 2020-05-10 PROCEDURE — 36415 COLL VENOUS BLD VENIPUNCTURE: CPT

## 2020-05-10 PROCEDURE — 84100 ASSAY OF PHOSPHORUS: CPT

## 2020-05-10 RX ORDER — LISINOPRIL 2.5 MG/1
1 TABLET ORAL
Qty: 30 | Refills: 0
Start: 2020-05-10 | End: 2020-06-08

## 2020-05-10 RX ORDER — FUROSEMIDE 40 MG
1 TABLET ORAL
Qty: 30 | Refills: 0
Start: 2020-05-10 | End: 2020-06-08

## 2020-05-10 RX ORDER — WARFARIN SODIUM 2.5 MG/1
1 TABLET ORAL
Qty: 30 | Refills: 0
Start: 2020-05-10 | End: 2020-06-08

## 2020-05-10 RX ADMIN — PANTOPRAZOLE SODIUM 40 MILLIGRAM(S): 20 TABLET, DELAYED RELEASE ORAL at 05:35

## 2020-05-10 RX ADMIN — Medication 20 MILLIGRAM(S): at 13:34

## 2020-05-10 RX ADMIN — Medication 0.5 MILLIGRAM(S): at 10:50

## 2020-05-10 RX ADMIN — Medication 500 MILLIGRAM(S): at 13:34

## 2020-05-10 RX ADMIN — Medication 5000 UNIT(S): at 13:34

## 2020-05-10 RX ADMIN — Medication 200 MILLIGRAM(S): at 05:35

## 2020-05-10 RX ADMIN — Medication 10 MILLIGRAM(S): at 13:34

## 2020-05-10 RX ADMIN — ATENOLOL 25 MILLIGRAM(S): 25 TABLET ORAL at 05:33

## 2020-05-10 RX ADMIN — Medication 325 MILLIGRAM(S): at 13:34

## 2020-05-10 RX ADMIN — Medication 1 TABLET(S): at 13:34

## 2020-05-10 RX ADMIN — Medication 500 MILLIGRAM(S): at 05:33

## 2020-05-10 NOTE — CHART NOTE - NSCHARTNOTEFT_GEN_A_CORE
Patient is to be discharged home today. Please refer to provider discharge note for further details. As per patient, has follow up appointment with PMD tomorrow.

## 2020-05-13 ENCOUNTER — APPOINTMENT (OUTPATIENT)
Dept: ORTHOPEDIC SURGERY | Facility: CLINIC | Age: 85
End: 2020-05-13

## 2020-05-15 ENCOUNTER — APPOINTMENT (OUTPATIENT)
Dept: ORTHOPEDIC SURGERY | Facility: CLINIC | Age: 85
End: 2020-05-15

## 2020-05-28 ENCOUNTER — APPOINTMENT (OUTPATIENT)
Dept: PULMONOLOGY | Facility: CLINIC | Age: 85
End: 2020-05-28
Payer: MEDICARE

## 2020-05-28 DIAGNOSIS — R93.3 ABNORMAL FINDINGS ON DIAGNOSTIC IMAGING OF OTHER PARTS OF DIGESTIVE TRACT: ICD-10-CM

## 2020-05-28 PROCEDURE — 99442: CPT | Mod: 95

## 2020-05-28 NOTE — DISCUSSION/SUMMARY
[FreeTextEntry1] : COPD with asthmatic component Gold II , \par recent hospital stay 4/27-5/10 [facility], for pneumonia , CHFpEF\par concomitant mod AS,\par will treat with short course of abx, pred taper\par Hx falls, has IVC filter, remains on Coumadin for atrial fibrillation\par ? watchman candidate she will follow with cardiology\par refuses cpap, has dentures cant use appliance, use 02 2 liters nocturnal\par ER if any change in status\par will arrange follow up 1 week

## 2020-05-28 NOTE — HISTORY OF PRESENT ILLNESS
[TextBox_4] : post hospital discharge [facility]\par pnuemonia, covid negative\par sent home on 02\par has been improving\par no chest pain or fevers\par cough now productive \par duo nebs and budesonide, using prn\par no fever, chill, chest pain\par remains on coumadin and IVC filter, also atrial fib

## 2020-05-28 NOTE — CONSULT LETTER
[Dear  ___] : Dear  [unfilled], [Consult Letter:] : I had the pleasure of evaluating your patient, [unfilled]. [Please see my note below.] : Please see my note below. [Sincerely,] : Sincerely, [FreeTextEntry3] : Owen William DO [facility]\par Pulmonary Critical Care\par Director Pulmonary Division\par Medical Director Respiratory Therapy\par [facility]\par \par

## 2020-05-28 NOTE — REASON FOR VISIT
[Home] : at home, [unfilled] , at the time of the visit. [Medical Office: ([facility])___] : at the medical office located in  [Verbal consent obtained from patient] : the patient, [unfilled]

## 2020-06-03 ENCOUNTER — APPOINTMENT (OUTPATIENT)
Dept: PULMONOLOGY | Facility: CLINIC | Age: 85
End: 2020-06-03
Payer: MEDICARE

## 2020-06-03 VITALS
SYSTOLIC BLOOD PRESSURE: 80 MMHG | WEIGHT: 222 LBS | BODY MASS INDEX: 34.84 KG/M2 | HEART RATE: 74 BPM | DIASTOLIC BLOOD PRESSURE: 50 MMHG | OXYGEN SATURATION: 95 % | HEIGHT: 67 IN

## 2020-06-03 DIAGNOSIS — R91.8 OTHER NONSPECIFIC ABNORMAL FINDING OF LUNG FIELD: ICD-10-CM

## 2020-06-03 DIAGNOSIS — Z87.01 PERSONAL HISTORY OF PNEUMONIA (RECURRENT): ICD-10-CM

## 2020-06-03 DIAGNOSIS — J15.9 UNSPECIFIED BACTERIAL PNEUMONIA: ICD-10-CM

## 2020-06-03 PROCEDURE — 99215 OFFICE O/P EST HI 40 MIN: CPT

## 2020-06-03 RX ORDER — CEFUROXIME AXETIL 500 MG/1
500 TABLET ORAL
Qty: 10 | Refills: 0 | Status: COMPLETED | COMMUNITY
Start: 2020-05-28 | End: 2020-06-03

## 2020-06-03 RX ORDER — FUROSEMIDE 20 MG/1
20 TABLET ORAL DAILY
Qty: 20 | Refills: 0 | Status: DISCONTINUED | COMMUNITY
Start: 2019-01-18 | End: 2020-06-03

## 2020-06-03 NOTE — CONSULT LETTER
[Dear  ___] : Dear  [unfilled], [Please see my note below.] : Please see my note below. [Consult Closing:] : Thank you very much for allowing me to participate in the care of this patient.  If you have any questions, please do not hesitate to contact me. [Consult Letter:] : I had the pleasure of evaluating your patient, [unfilled]. [Sincerely,] : Sincerely, [DrDave  ___] : Dr. STONER [DrDave ___] : Dr. STONER [[facility]wen William DO, [facility]] : Owen William DO, [facility] [Director, Respiratory Care] : Director, Respiratory Care [[facility]] : [facility] [FreeTextEntry3] : [facility]\par Pulmonary Critical Care\par Director Pulmonary Division\par Medical Director Respiratory Therapy\par [facility]\par \par

## 2020-06-03 NOTE — DISCUSSION/SUMMARY
[FreeTextEntry1] :  COPD with asthmatic component Gold II , \par post Hospital stay [facility] 4/27-5/10, pna , chf\par s/p recent prednisone abx from PMD, now much improved\par continue duo neb prn, mild restriction on spirometry- stable\par current exam without bronchospasm, normal sp02\par concomitant mod AS, diastolic dysfunction\par Hx falls, has IVC filter, remains on Coumadin for atrial fibrillation\par ? watchman candidate she will follow with cardiology\par refuses cpap, has dentures cant use appliance, use 02 2 liters nocturnal\par Will repeat CT scan fro pneumonia follow up\par 4   months   or sooner if needed \par

## 2020-06-03 NOTE — PHYSICAL EXAM
[Normal Appearance] : normal appearance [General Appearance - Well Developed] : well developed [Well Groomed] : well groomed [General Appearance - Well Nourished] : well nourished [No Deformities] : no deformities [Neck Appearance] : the appearance of the neck was normal [Heart Sounds] : normal S1 and S2 [Heart Rate And Rhythm] : heart rate and rhythm were normal [Murmurs] : no murmurs present [Exaggerated Use Of Accessory Muscles For Inspiration] : no accessory muscle use [Respiration, Rhythm And Depth] : normal respiratory rhythm and effort [Nail Clubbing] : no clubbing of the fingernails [Cyanosis, Localized] : no localized cyanosis [] : no rash [Oriented To Time, Place, And Person] : oriented to person, place, and time [No Focal Deficits] : no focal deficits [Affect] : the affect was normal [Impaired Insight] : insight and judgment were intact [Memory Recent] : recent memory was not impaired [FreeTextEntry1] : in wheelchair [FreeTextEntry2] : no calf tenderness

## 2020-06-03 NOTE — HISTORY OF PRESENT ILLNESS
[FreeTextEntry1] : at baseline, no new pulmonary complaints\par duo nebs and budesonide, using prn\par no fever, chill, chest pain\par remains on coumadin and IVC filter, also atrial fib\par on prednisone and ceftin from PMD\par feels sig better\par \par

## 2020-06-10 ENCOUNTER — OUTPATIENT (OUTPATIENT)
Dept: OUTPATIENT SERVICES | Facility: HOSPITAL | Age: 85
LOS: 1 days | End: 2020-06-10

## 2020-06-10 ENCOUNTER — APPOINTMENT (OUTPATIENT)
Dept: CT IMAGING | Facility: CLINIC | Age: 85
End: 2020-06-10

## 2020-06-10 DIAGNOSIS — G56.00 CARPAL TUNNEL SYNDROME, UNSPECIFIED UPPER LIMB: Chronic | ICD-10-CM

## 2020-06-10 DIAGNOSIS — Z98.890 OTHER SPECIFIED POSTPROCEDURAL STATES: Chronic | ICD-10-CM

## 2020-06-10 DIAGNOSIS — H02.403 UNSPECIFIED PTOSIS OF BILATERAL EYELIDS: Chronic | ICD-10-CM

## 2020-06-10 DIAGNOSIS — Z98.89 OTHER SPECIFIED POSTPROCEDURAL STATES: Chronic | ICD-10-CM

## 2020-06-10 DIAGNOSIS — H02.409 UNSPECIFIED PTOSIS OF UNSPECIFIED EYELID: Chronic | ICD-10-CM

## 2020-06-10 DIAGNOSIS — Z98.42 CATARACT EXTRACTION STATUS, LEFT EYE: Chronic | ICD-10-CM

## 2020-06-10 DIAGNOSIS — D12.6 BENIGN NEOPLASM OF COLON, UNSPECIFIED: Chronic | ICD-10-CM

## 2020-06-10 DIAGNOSIS — Z96.651 PRESENCE OF RIGHT ARTIFICIAL KNEE JOINT: Chronic | ICD-10-CM

## 2020-06-10 DIAGNOSIS — Z90.710 ACQUIRED ABSENCE OF BOTH CERVIX AND UTERUS: Chronic | ICD-10-CM

## 2020-06-10 DIAGNOSIS — Z00.8 ENCOUNTER FOR OTHER GENERAL EXAMINATION: ICD-10-CM

## 2020-06-10 DIAGNOSIS — Z95.828 PRESENCE OF OTHER VASCULAR IMPLANTS AND GRAFTS: Chronic | ICD-10-CM

## 2020-06-10 DIAGNOSIS — K64.8 OTHER HEMORRHOIDS: Chronic | ICD-10-CM

## 2020-06-10 DIAGNOSIS — Z90.49 ACQUIRED ABSENCE OF OTHER SPECIFIED PARTS OF DIGESTIVE TRACT: Chronic | ICD-10-CM

## 2020-06-10 DIAGNOSIS — Z95.0 PRESENCE OF CARDIAC PACEMAKER: Chronic | ICD-10-CM

## 2020-06-10 DIAGNOSIS — Z98.41 CATARACT EXTRACTION STATUS, RIGHT EYE: Chronic | ICD-10-CM

## 2020-06-10 DIAGNOSIS — M75.121 COMPLETE ROTATOR CUFF TEAR OR RUPTURE OF RIGHT SHOULDER, NOT SPECIFIED AS TRAUMATIC: Chronic | ICD-10-CM

## 2020-07-08 ENCOUNTER — APPOINTMENT (OUTPATIENT)
Dept: PULMONOLOGY | Facility: CLINIC | Age: 85
End: 2020-07-08

## 2020-08-18 NOTE — PROVIDER CONTACT NOTE (CRITICAL VALUE NOTIFICATION) - PERSON GIVING RESULT:
2020    From the office of:   Matthew Khalil MD   [address]  [address]  [facility] [address]  Phone: [phone]  Fax: [phone]    In regards to Alejo Ochoa, :  2019    Radha Brush MD  [address]  [address]  [place] [address]    Dear Radha:      I had the pleasure of seeing Alejo Ochoa for a follow-up evaluation at the outpatient Cardiology Clinic on 2020. As you know, Alejo is a 13 month old boy with a history of a small, muscular VSD.  He was last seen on 2019 and continues to do well. He is completely asymptomatic. Unfortunately, he was crying throughout his visit today so I could not determine whether his VSD was still present or not. Regardless, as the defect was small and he is doing well, I would simply like to see him back in 3 years and do not recommend any SBE prophylaxis in the interim. Thanks for allowing me to participate in Alejo's care. If you have any questions or concerns, do not hesitate to contact me.    Sincerely,  Matthew Khalil MD  [facility]  Pediatric Cardiology        
lab

## 2020-09-18 NOTE — ED PROVIDER NOTE - EXITCARE/DISCHARGE INSTRUCTIONS
Patient had called stated insurance would not pay for Percocet. Dr. Sandra Dominguez notified. Meds changed. Launch [employer] and print the 'Prescriptions from this Visit' Report

## 2020-10-20 ENCOUNTER — APPOINTMENT (OUTPATIENT)
Dept: PULMONOLOGY | Facility: CLINIC | Age: 85
End: 2020-10-20
Payer: MEDICARE

## 2020-10-20 PROCEDURE — 99442: CPT | Mod: 95

## 2020-10-20 RX ORDER — BENZONATATE 100 MG/1
100 CAPSULE ORAL 3 TIMES DAILY
Qty: 90 | Refills: 1 | Status: DISCONTINUED | COMMUNITY
Start: 2020-06-01 | End: 2020-10-20

## 2020-10-20 RX ORDER — ALBUTEROL SULFATE 90 UG/1
108 (90 BASE) INHALANT RESPIRATORY (INHALATION)
Qty: 1 | Refills: 3 | Status: DISCONTINUED | COMMUNITY
Start: 2020-06-03 | End: 2020-10-20

## 2020-10-20 RX ORDER — BENZONATATE 100 MG/1
100 CAPSULE ORAL
Refills: 0 | Status: DISCONTINUED | COMMUNITY
End: 2020-10-20

## 2020-10-20 RX ORDER — PREDNISONE 10 MG/1
10 TABLET ORAL
Qty: 30 | Refills: 3 | Status: DISCONTINUED | COMMUNITY
Start: 2020-05-28 | End: 2020-10-20

## 2020-10-20 NOTE — CONSULT LETTER
[Consult Letter:] : I had the pleasure of evaluating your patient, [unfilled]. [Dear  ___] : Dear  [unfilled], [Sincerely,] : Sincerely, [Please see my note below.] : Please see my note below. [FreeTextEntry3] : Owen William DO [facility]\par Pulmonary Critical Care\par Director Pulmonary Division\par Medical Director Respiratory Therapy\par [facility]\par \par

## 2020-10-20 NOTE — REASON FOR VISIT
[Home] : at home, [unfilled] , at the time of the visit. [Medical Office: ([facility])___] : at the medical office located in  [Verbal consent obtained from patient] : the patient, [unfilled]

## 2020-10-20 NOTE — DISCUSSION/SUMMARY
[FreeTextEntry1] : COPD with asthmatic component Gold II , \par post Hospital stay [facility] for syncope, ORIF R hip, pulmonary embolism\par echo with mild pulmonary hypertension only\par now clinically slowly improving at home, no acute pulmonary complaints\par using her nebulizer ( Duo neb) and 02\par Needs cardiology follow up\par concomitant mod AS, diastolic dysfunction\par refuses cpap, has dentures cant use appliance, use 02 2 liters nocturnal\par discussed with daughter will schedule follow up for 2 months, can be sooner pending status\par prognosis guarded\par

## 2020-10-20 NOTE — HISTORY OF PRESENT ILLNESS
[TextBox_4] : pt called recent discharge from Rehab\par prolonged hospital course [facility] x2\par initial syncope with hip fx and OR- sent home on apixaban 2.5 mg\par second visit with new RLL, RML PE, sent home on Eliquis\par seen by multiple specialist\par currently doing better, using 02\par has duo neb\par no bleeding\par no fever, chill, chest pain

## 2020-10-21 NOTE — ED PROVIDER NOTE - CCCP TRG CHIEF CMPLNT
Impression: S/P IOL lens-SA60AT 16.50 OD - 1 Day. Encounter for surgical aftercare following surgery on a sense organ  Z48.810. Plan: Looks good, healing well. Ok to proceed with the left eye.  --Continue Cipro shortness of breath

## 2020-12-13 NOTE — ED ADULT NURSE NOTE - PAIN RATING/NUMBER SCALE (0-10): REST
History of Present Illness:        Mr. Schuster is a 60 year-old male with a history of alcohol dependence and GERD, who I initially saw in the office on 09/29/20.  He returns today in routine GI follow-up.    -- Initially seen in the office on 09/29/20 as hospital follow-up for diverticulitis.  Please refer to that documentation with respect to his hospital presentation and laboratory and imaging findings.  -- By the time I saw him in the office, he had completed his antibiotics for diverticulitis and was compliant with pantoprazole 40 mg BID and sucralfate 1 g TID.  -- He also had not drank any alcohol since discharge 3 weeks prior..  -- He stated that his abdominal discomfort was \"99% better\" and diarrhea was more formed and only 1-2 stools per day (prior, was stooling every 20 minutes).  -- He commented on a handful of episodes of hematochezia, as swell as a 15 pound weight loss in the prior year.  -- Our plan was to proceed with EGD/colonoscopy, but we wanted to see him be sober for a longer period of time before proceeding -- the goal was to confirm healing of the significant gastritis noted at 09/20 EGD, delineate extent of diverticular disease and rule-out mass lesion in the impacted segment, and work-up macrocytic anemia in setting of stool hemoccult-positivity.    -- Was seen by PCP on 11/23/20 -- unfortunately, after a period of sobriety of about 6 weeks, he started drinking alcohol again about 1 month (ie, 3 drinks nightly, as compared to 8 helpings of whiskey daily).  -- With this his epigastric pain returned -- this is intermittent, though he gets it most days.  -- More of a bloating sensation, feels full -- not as severe as it was when he was hospitalized.  -- He stopped taking both pantoprazole and sucralfate about 2 months ago.  -- Has been using kaopectate instead when discomfort is more significant.  -- Eating makes the discomfort better.  -- Does not wake him up at night.  -- Discomfort seems to  come on later in the day.  -- No heartburn, dysphagia, early satiety, anorexia, chest pain, or nausea/vomiting.  -- Has gained 8 pounds since last visit, but there has been noticeable loss of muscle mass in arms.  -- No ibuprofen or ASA products.    -- He is averaging 3-4 stools per day.  -- Stools are pasty -- not watery but not formed either.  -- Has had tenesmus but no nocturnal awakening.  -- Continues to note bright red blood in his stool -- tends to occur when he has missed 1-2 meals.  -- Sees the blood smeared atop the stool -- no clots.  -- Has blood with 10% of stools.  -- No sajan-anal discomfort or swelling.    -- Denies jaundice, ascites, leg swelling, confusion, reversal of sleep/wake, gastrointestinal bleeding, pruritus, easy bruising, and fatigue.    -- Labs in  revealed stable HgB of 12.1 with MCV 97 (was 110 previously), normalized LFTs save for ongoing AP elevation to 254, and normal platelets and INR.         Review of Systems:   GI review of symptoms as in HPI.  All other systems reviewed and are negative.     Past Medical History:   1.  Alcohol dependence  2.  Hypertension  3.  GERD  4.  Acute diverticulitis in   5.  S/p umbilical hernia repair.    Medications:  Metoprolol 100 mg BID  Amlodipine 10 mg Qday  Lisinopril 40 mg Qday  Thiamine 100 mg Qday  Folic acid 1 mg Qday  MVI    Allergies:  NKDA    Family History:  No liver disease.  No colorectal cancer.    Social History:  Alcohol:  8 drinks of whiskey daily -- consumption increased when he retired 3.5 years ago.  Prior to that, he drank beer mainly, up to 12 beers daily for at least 10 years.  Smokin pack-per-day x 10 years but smoked less before that (began smoking at age 20).  Illicits:  Denies  Retired construction    Physical Examination:    Vitals -- Wt 153 lbs     BMI 22.0  General -- Chronically ill-appearing  male in no apparent distress.  Skin -- no pallor, no jaundice, no rashes, + palmar erythema, no spider  angiomata or caput  HEENT -- moist mucous membranes, pink conjunctivae, anicteric sclerae, no oral lesions  Neck -- supple, no LAD  Chest -- no sternal tenderness  CV -- RRR, no murmurs  Lungs -- CTA B, good aeration  Abdomen -- soft, non-tender, non-distended, active bowel sounds, liver edge and spleen tip not palpable, no ascites  Rectal -- deferred to colonoscopy  Extremities -- no edema or cyanosis, well-perfused  Psych -- A&Ox3, normal affect  Neuro -- non-focal, no asterixis    Labs:   11/20  WBC 11.4     HgB 12.1     MCV 97     Plt 237  Na 134     K 4.4     Cl 101     HCO3 26     BUN 12     Cr 0.7     Glu 104  AST 37     ALT 11     TB 1.0          Alb 3.7  INR 1.1    09/20 ([facility])  WBC 10.8     HgB 12.4         Plt 140  Na 129     K 3.5     Cl 88     HCO3 27     BUN 10     Cr 1.0     Glu 113            TB 1.7          Alb 3.8  INR 1.3  C diff -  Stool hemoccult +    Tests/Imaging:  EGD (09/09/20), per Dr. Stanford -- diffuse moderate edema and erythema and antrum/body/fundus (bx: chronic gastritis, no h pylori)    CT abdomen/pelvis with IV contrast (09/20) --   1.  Mild inflammatory changes seen within the left hemiabdomen surrounding diverticula which may represent early changes of diverticulitis.  2.  Heterogeneous enhancement of the liver which is nonspecific and may represent hepatitis.  3.  No splenomegaly or ascites.    Assessment/Plan:   Mr. Schuster is a 60 year-old male with alcohol dependence, here in hospital follow-up.    1)  Acute diverticulitis -- CT in 09/19 revealed possible acute diverticulitis changes.  He never had LLQ pain but did have diarrhea, which was helped by antibiotics.  Has not had a colonoscopy.  -- Original plan was to proceed with colonoscopy once he displayed sobriety from alcohol for a few months, but he has reverted back to drinking and it is clear that this will be a long-term issue for him.  As such, will proceed with  colonoscopy now to delineate the extent of his diverticular disease and rule-out mass lesion in the impacted segment.   -- Prep: Suprep.   -- Sedation: Propofol.   -- Consent: Risks, complications, benefits and alternative therapies of colonoscopy discussed.  Risks to include bleeding, sometimes requiring blood transfusion or surgery, infection, perforation sometimes requiring surgery, and the risk of missed lesions secondary to spasm, poor preparation, or angulation of the intestine.  Benefits to include identification in a timely fashion of polyps or lesions which could lead to underlying GI malignancy, as well as potential causes of diarrhea.  Alternatives to include stool hemoccults with flexible sigmoidoscopy, barium enema, virtual colonoscopy (though the patient is aware that this study will not be covered by insurance), stool studies, CT imaging, or no testing at all.  No guarantees were given.  -- We discussed a high fiber diet as a means of secondary prophylaxis against future diverticular complications.    2)  CT with diffuse gastric wall thickening/EGD with diffuse gastritis/Epigastric pain -- EGD in 09/20 with diffuse gastritis changes, h pylori-negative.  Likely due to alcoholic injury and smoking.  He had epigastric pain, which improved with PPI + sucralfate and alcohol cessation.  However, mild epigastric discomfort has returned with resumption of alcohol.  CT did not reveal any pancreatic issues.  -- Repeat EGD now.   -- Sedation: Propofol.   -- Consent:  Risks, benefits, complications, and alternatives to EGD were discussed.  Risks to include esophageal or gastric perforation, perhaps requiring surgery, bleeding, infection, and missed lesions.  Benefits to include identification of lesions that may lead to health-preserving and health-improving interventions.  Alternatives to include barium swallow, esophagram, or no endoscopic evaluation at all.  No guarantees were given.  -- Alcohol cessation.  --  Smoking cessation.  -- Will likely resume at least the PPI (ie, pantoprazole 40 mg BID), and possibly sucralfate as well, after EGD.    3)  Diarrhea -- Improved with treatment of diverticulitis and alcohol cessation.  Stool studies, including c diff, negative.  Mild diarrhea has returned with resumption of alcohol.  Unlikely to represent infectious colitis, IBD, celiac disease, eosinophilic gastroenteritis, microscopic colitis, thyroid disease, etc.  -- Alcohol cessation.  -- Imodium PRN.  -- Random biopsies at upcoming colonoscopy.    4)  Weight loss -- Likely due to malnutrition of alcoholism.  EGD without mass lesion or ulcerative disease.  TSH unknown.  CT abdomen/pelvis unrevealing.  Has gained a few pounds since last visit, though there has been loss of muscle mass.  -- Will assess for colonic malignancy at time of colonoscopy.    5)  Macrocytic anemia with stool hemoccult-positivity -- Suspect the anemia is due to bone marrow suppression from alcohol abuse.  Stool hemoccult-positivity could be due to severe gastritis.  -- Will be having colonoscopy in next few months.  -- Monitory improvement of HgB the further removed he is from last alcohol consumption.  -- Continue thiamine and folate supplementation.    6)  Elevated LFTs -- Most consistent with mild alcoholic hepaitis -- Maddrey discriminant function < 32.  He could have underlying cirrhosis given the mild thrombocytopenia and coagulopathy, but CT and EGD without portal hypertensive sequelae.  LFTs have largely normalized save for persistent AP elevation (which could still represent cholestatic alcoholic liver injury).  Unfortunately, he has returned to drinking alcohol.  -- He will follow-up after EGD/colonsocopy to fully address his liver concerns.  Will likely need comprehensive serologic evaluation and MRCP (if AP remains elevated).  A liver biopsy may also be ultimately recommended, particularly if cirrhosis is suggested clinically but there is no  definitive evidence for it on imaging or laboratories.    7)  Colon cancer screening -- Has not had a screening colonoscopy.  -- He will be getting a colonoscopy due to his diverticulitis diagnosis -- will assess for polyps at that time.    8)  Hematochezia -- Statistically and descriptively, this is almost certainly hemorrhoidal.  It is inconsistent with ischemia, diverticulosis, anal fissure, infection, and IBD.  Colorectal neoplasia should be exonerated.  -- Colonoscopy, as above.    Follow-up after EGD/colonoscopy.      Electronically signed by: Darrius Butler MD     12/14/2020   0

## 2020-12-23 ENCOUNTER — APPOINTMENT (OUTPATIENT)
Dept: PULMONOLOGY | Facility: CLINIC | Age: 85
End: 2020-12-23
Payer: MEDICARE

## 2020-12-23 VITALS
SYSTOLIC BLOOD PRESSURE: 115 MMHG | BODY MASS INDEX: 35.08 KG/M2 | HEART RATE: 68 BPM | OXYGEN SATURATION: 98 % | RESPIRATION RATE: 16 BRPM | WEIGHT: 224 LBS | DIASTOLIC BLOOD PRESSURE: 60 MMHG

## 2020-12-23 PROCEDURE — 99203 OFFICE O/P NEW LOW 30 MIN: CPT

## 2020-12-23 PROCEDURE — 99214 OFFICE O/P EST MOD 30 MIN: CPT

## 2020-12-23 RX ORDER — SULFAMETHOXAZOLE AND TRIMETHOPRIM 400; 80 MG/1; MG/1
400-80 TABLET ORAL
Refills: 0 | Status: ACTIVE | COMMUNITY

## 2020-12-23 NOTE — HISTORY OF PRESENT ILLNESS
[TextBox_4] : Pleasant 87-year-old female with a history of stage II chronic obstructive lung disease with significant proximal spastic component. Course is complicated by prior pulmonary emboli, atrial fibrillation, status post IVC filter, and status post hip repair following a fracture. Over the course of the last several days. The patient has complained of increasing symptoms of shortness of breath associated with cough and wheeze. History is negative for fevers, chills, chest pains, palpitations, lightheadedness, dizziness. Patient had been poorly compliant with a nebulizer using it only twice a day.\par \par Patient remains wheelchair bound. She has been using oxygen 24, 7, and also requires recertification.\par

## 2020-12-23 NOTE — DISCUSSION/SUMMARY
[FreeTextEntry1] : 87-year-old female, seen today for the above. Patient does be suffering from a bronchospastic exacerbation, most likely the basis of underlying COPD/asthma. Differential diagnosis includes congestive heart failure, pneumonia. Course was complicated by poor compliance with medications. Presently, her resting pulse oximetry on room air is normal. To recertify her oxygen she will require overnight oximetry

## 2020-12-23 NOTE — CONSULT LETTER
[Dear  ___] : Dear  [unfilled], [Consult Letter:] : I had the pleasure of evaluating your patient, [unfilled]. [Please see my note below.] : Please see my note below. [Consult Closing:] : Thank you very much for allowing me to participate in the care of this patient.  If you have any questions, please do not hesitate to contact me. [Sincerely,] : Sincerely, [FreeTextEntry3] : Christiano Ramos MD FCCP\par Pulmonary/Critical Care/Sleep Medicine\par Department of Internal Medicine\par \par [facility] [employer]\par

## 2020-12-23 NOTE — PHYSICAL EXAM
[No Acute Distress] : no acute distress [Normal Oropharynx] : normal oropharynx [Normal Appearance] : normal appearance [No Neck Mass] : no neck mass [Normal Rate/Rhythm] : normal rate/rhythm [Normal S1, S2] : normal s1, s2 [No Murmurs] : no murmurs [No Abnormalities] : no abnormalities [Benign] : benign [Normal Gait] : normal gait [No Clubbing] : no clubbing [No Cyanosis] : no cyanosis [FROM] : FROM [Normal Color/ Pigmentation] : normal color/ pigmentation [No Focal Deficits] : no focal deficits [Oriented x3] : oriented x3 [Normal Affect] : normal affect [TextBox_2] : Obese [TextBox_68] : Decreased breath sounds with end expiratory wheezes bilateral [TextBox_105] : Chronic lymphedema of the lower extremities

## 2021-01-11 NOTE — OCCUPATIONAL THERAPY INITIAL EVALUATION ADULT - LEVEL OF INDEPENDENCE: SIT/STAND, REHAB EVAL
LOV:10/20/2020  NOV:01/27/2021  Labs:10/20/2020    The patient was instructed to return for follow-up in 4 months.    
independent

## 2021-01-19 ENCOUNTER — RX CHANGE (OUTPATIENT)
Age: 86
End: 2021-01-19

## 2021-01-19 RX ORDER — BUDESONIDE 0.5 MG/2ML
0.5 INHALANT ORAL
Qty: 360 | Refills: 0 | Status: ACTIVE | COMMUNITY
Start: 2019-01-18 | End: 1900-01-01

## 2021-01-20 ENCOUNTER — APPOINTMENT (OUTPATIENT)
Dept: PULMONOLOGY | Facility: CLINIC | Age: 86
End: 2021-01-20

## 2021-01-23 DIAGNOSIS — Z01.818 ENCOUNTER FOR OTHER PREPROCEDURAL EXAMINATION: ICD-10-CM

## 2021-01-24 ENCOUNTER — APPOINTMENT (OUTPATIENT)
Dept: DISASTER EMERGENCY | Facility: CLINIC | Age: 86
End: 2021-01-24

## 2021-01-26 LAB — SARS-COV-2 N GENE NPH QL NAA+PROBE: NOT DETECTED

## 2021-01-28 ENCOUNTER — APPOINTMENT (OUTPATIENT)
Dept: PULMONOLOGY | Facility: CLINIC | Age: 86
End: 2021-01-28
Payer: MEDICARE

## 2021-01-28 VITALS
SYSTOLIC BLOOD PRESSURE: 102 MMHG | DIASTOLIC BLOOD PRESSURE: 68 MMHG | BODY MASS INDEX: 33.83 KG/M2 | HEIGHT: 67 IN | OXYGEN SATURATION: 94 % | HEART RATE: 74 BPM

## 2021-01-28 VITALS — TEMPERATURE: 97.5 F

## 2021-01-28 VITALS — WEIGHT: 216 LBS | BODY MASS INDEX: 33.83 KG/M2

## 2021-01-28 DIAGNOSIS — J44.1 CHRONIC OBSTRUCTIVE PULMONARY DISEASE WITH (ACUTE) EXACERBATION: ICD-10-CM

## 2021-01-28 DIAGNOSIS — Z23 ENCOUNTER FOR IMMUNIZATION: ICD-10-CM

## 2021-01-28 DIAGNOSIS — J45.909 UNSPECIFIED ASTHMA, UNCOMPLICATED: ICD-10-CM

## 2021-01-28 DIAGNOSIS — G47.33 OBSTRUCTIVE SLEEP APNEA (ADULT) (PEDIATRIC): ICD-10-CM

## 2021-01-28 PROCEDURE — 94010 BREATHING CAPACITY TEST: CPT

## 2021-01-28 PROCEDURE — 99215 OFFICE O/P EST HI 40 MIN: CPT | Mod: 25

## 2021-01-28 RX ORDER — LISINOPRIL 2.5 MG/1
2.5 TABLET ORAL
Refills: 0 | Status: DISCONTINUED | COMMUNITY
End: 2021-01-28

## 2021-01-28 RX ORDER — APIXABAN 5 MG/1
5 TABLET, FILM COATED ORAL
Refills: 0 | Status: ACTIVE | COMMUNITY

## 2021-01-28 RX ORDER — PREDNISONE 10 MG/1
10 TABLET ORAL
Qty: 42 | Refills: 0 | Status: DISCONTINUED | COMMUNITY
Start: 2020-12-23 | End: 2021-01-28

## 2021-01-28 RX ORDER — ALBUTEROL SULFATE 90 UG/1
108 (90 BASE) INHALANT RESPIRATORY (INHALATION)
Qty: 1 | Refills: 5 | Status: ACTIVE | COMMUNITY
Start: 2021-01-28 | End: 1900-01-01

## 2021-01-28 RX ORDER — SUCRALFATE 1 G/1
1 TABLET ORAL
Refills: 0 | Status: ACTIVE | COMMUNITY

## 2021-01-28 RX ORDER — BENZONATATE 100 MG/1
100 CAPSULE ORAL
Refills: 0 | Status: ACTIVE | COMMUNITY

## 2021-01-28 RX ORDER — TORSEMIDE 10 MG/1
10 TABLET ORAL
Refills: 0 | Status: ACTIVE | COMMUNITY

## 2021-01-28 RX ORDER — ALBUTEROL SULFATE 2.5 MG/3ML
(2.5 MG/3ML) SOLUTION RESPIRATORY (INHALATION)
Qty: 1 | Refills: 5 | Status: ACTIVE | COMMUNITY
Start: 2021-01-28 | End: 1900-01-01

## 2021-01-28 RX ORDER — MIDODRINE HYDROCHLORIDE 2.5 MG/1
2.5 TABLET ORAL
Refills: 0 | Status: ACTIVE | COMMUNITY

## 2021-01-28 RX ORDER — WARFARIN SODIUM 4 MG/1
4 TABLET ORAL
Refills: 0 | Status: DISCONTINUED | COMMUNITY
End: 2021-01-28

## 2021-01-28 NOTE — DISCUSSION/SUMMARY
[FreeTextEntry1] : COPD with asthmatic component Gold II , \par post Hospital stay [facility] for syncope, ORIF R hip, pulmonary embolism\par echo with mild pulmonary hypertension only\par now clinically slowly improving at home, no acute pulmonary complaints\par using her nebulizer ( Duo neb) and budesonide , add albuterol with Mucomyst prn\par spirometry with severe air flow obstruction,decreased from 2020\par now normoxic on room air\par Needs cardiology follow up\par concomitant mod AS, diastolic dysfunction\par refuses cpap, has dentures cant use appliance, use 02 2 liters nocturnal, will check overnight oximetry \par discussed with daughter will schedule follow up for 3 months, can be sooner pending status\par prognosis guarded\par

## 2021-01-28 NOTE — CONSULT LETTER
[Dear  ___] : Dear  [unfilled], [Consult Letter:] : I had the pleasure of evaluating your patient, [unfilled]. [Please see my note below.] : Please see my note below. [Sincerely,] : Sincerely, [FreeTextEntry3] : Owen William DO [facility]\par Pulmonary Critical Care\par Director Pulmonary Division\par Medical Director Respiratory Therapy\par [facility]\par \par  [DrDave  ___] : Dr. STONER

## 2021-02-02 ENCOUNTER — RX CHANGE (OUTPATIENT)
Age: 86
End: 2021-02-02

## 2021-02-02 RX ORDER — ACETYLCYSTEINE 200 MG/ML
20 SOLUTION ORAL; RESPIRATORY (INHALATION)
Qty: 28 | Refills: 3 | Status: ACTIVE | COMMUNITY
Start: 2021-01-28 | End: 1900-01-01

## 2021-04-21 ENCOUNTER — RESULT REVIEW (OUTPATIENT)
Age: 86
End: 2021-04-21

## 2021-04-21 ENCOUNTER — APPOINTMENT (OUTPATIENT)
Dept: RADIOLOGY | Facility: CLINIC | Age: 86
End: 2021-04-21
Payer: MEDICARE

## 2021-04-21 ENCOUNTER — OUTPATIENT (OUTPATIENT)
Dept: OUTPATIENT SERVICES | Facility: HOSPITAL | Age: 86
LOS: 1 days | End: 2021-04-21
Payer: MEDICARE

## 2021-04-21 DIAGNOSIS — M75.121 COMPLETE ROTATOR CUFF TEAR OR RUPTURE OF RIGHT SHOULDER, NOT SPECIFIED AS TRAUMATIC: Chronic | ICD-10-CM

## 2021-04-21 DIAGNOSIS — Z98.890 OTHER SPECIFIED POSTPROCEDURAL STATES: Chronic | ICD-10-CM

## 2021-04-21 DIAGNOSIS — H02.409 UNSPECIFIED PTOSIS OF UNSPECIFIED EYELID: Chronic | ICD-10-CM

## 2021-04-21 DIAGNOSIS — Z90.49 ACQUIRED ABSENCE OF OTHER SPECIFIED PARTS OF DIGESTIVE TRACT: Chronic | ICD-10-CM

## 2021-04-21 DIAGNOSIS — J44.1 CHRONIC OBSTRUCTIVE PULMONARY DISEASE WITH (ACUTE) EXACERBATION: ICD-10-CM

## 2021-04-21 DIAGNOSIS — Z98.89 OTHER SPECIFIED POSTPROCEDURAL STATES: Chronic | ICD-10-CM

## 2021-04-21 DIAGNOSIS — Z98.41 CATARACT EXTRACTION STATUS, RIGHT EYE: Chronic | ICD-10-CM

## 2021-04-21 DIAGNOSIS — Z95.0 PRESENCE OF CARDIAC PACEMAKER: Chronic | ICD-10-CM

## 2021-04-21 DIAGNOSIS — Z95.828 PRESENCE OF OTHER VASCULAR IMPLANTS AND GRAFTS: Chronic | ICD-10-CM

## 2021-04-21 DIAGNOSIS — D12.6 BENIGN NEOPLASM OF COLON, UNSPECIFIED: Chronic | ICD-10-CM

## 2021-04-21 DIAGNOSIS — Z98.42 CATARACT EXTRACTION STATUS, LEFT EYE: Chronic | ICD-10-CM

## 2021-04-21 DIAGNOSIS — Z90.710 ACQUIRED ABSENCE OF BOTH CERVIX AND UTERUS: Chronic | ICD-10-CM

## 2021-04-21 DIAGNOSIS — H02.403 UNSPECIFIED PTOSIS OF BILATERAL EYELIDS: Chronic | ICD-10-CM

## 2021-04-21 DIAGNOSIS — Z96.651 PRESENCE OF RIGHT ARTIFICIAL KNEE JOINT: Chronic | ICD-10-CM

## 2021-04-21 DIAGNOSIS — G56.00 CARPAL TUNNEL SYNDROME, UNSPECIFIED UPPER LIMB: Chronic | ICD-10-CM

## 2021-04-21 DIAGNOSIS — K64.8 OTHER HEMORRHOIDS: Chronic | ICD-10-CM

## 2021-04-21 PROCEDURE — 71046 X-RAY EXAM CHEST 2 VIEWS: CPT | Mod: 26

## 2021-04-21 PROCEDURE — 71046 X-RAY EXAM CHEST 2 VIEWS: CPT

## 2021-04-29 ENCOUNTER — APPOINTMENT (OUTPATIENT)
Dept: PULMONOLOGY | Facility: CLINIC | Age: 86
End: 2021-04-29
Payer: MEDICARE

## 2021-04-29 VITALS
OXYGEN SATURATION: 98 % | SYSTOLIC BLOOD PRESSURE: 118 MMHG | BODY MASS INDEX: 34.37 KG/M2 | DIASTOLIC BLOOD PRESSURE: 76 MMHG | HEART RATE: 74 BPM | TEMPERATURE: 97.3 F | WEIGHT: 219 LBS | HEIGHT: 67 IN

## 2021-04-29 DIAGNOSIS — I26.99 OTHER PULMONARY EMBOLISM W/OUT ACUTE COR PULMONALE: ICD-10-CM

## 2021-04-29 DIAGNOSIS — I82.409 ACUTE EMBOLISM AND THROMBOSIS OF UNSPECIFIED DEEP VEINS OF UNSPECIFIED LOWER EXTREMITY: ICD-10-CM

## 2021-04-29 DIAGNOSIS — J44.9 CHRONIC OBSTRUCTIVE PULMONARY DISEASE, UNSPECIFIED: ICD-10-CM

## 2021-04-29 PROCEDURE — 99214 OFFICE O/P EST MOD 30 MIN: CPT

## 2021-04-29 RX ORDER — TIZANIDINE HYDROCHLORIDE 4 MG/1
4 CAPSULE ORAL
Refills: 0 | Status: ACTIVE | COMMUNITY

## 2021-04-29 RX ORDER — IPRATROPIUM BROMIDE AND ALBUTEROL SULFATE 2.5; .5 MG/3ML; MG/3ML
0.5-2.5 (3) SOLUTION RESPIRATORY (INHALATION) 4 TIMES DAILY
Qty: 1 | Refills: 5 | Status: ACTIVE | COMMUNITY
Start: 2021-04-29 | End: 1900-01-01

## 2021-04-29 RX ORDER — SPIRONOLACTONE 25 MG/1
25 TABLET ORAL
Refills: 0 | Status: ACTIVE | COMMUNITY
Start: 2021-04-29

## 2021-04-29 RX ORDER — IPRATROPIUM BROMIDE AND ALBUTEROL SULFATE 2.5; .5 MG/3ML; MG/3ML
0.5-2.5 (3) SOLUTION RESPIRATORY (INHALATION) 4 TIMES DAILY
Qty: 120 | Refills: 5 | Status: DISCONTINUED | COMMUNITY
Start: 2019-01-18 | End: 2021-04-29

## 2021-04-29 RX ORDER — ACETYLCYSTEINE 100 MG/ML
10 SOLUTION ORAL; RESPIRATORY (INHALATION) EVERY 8 HOURS
Qty: 90 | Refills: 3 | Status: ACTIVE | COMMUNITY
Start: 2021-04-29 | End: 1900-01-01

## 2021-04-29 RX ORDER — BUDESONIDE 0.5 MG/2ML
0.5 INHALANT ORAL TWICE DAILY
Qty: 60 | Refills: 5 | Status: ACTIVE | COMMUNITY
Start: 2021-04-29 | End: 1900-01-01

## 2021-04-29 NOTE — DISCUSSION/SUMMARY
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\par  no acute pulmonary complaints\par using her nebulizer ( Duo neb) and budesonide , add albuterol with Mucomyst prn\par spirometry with severe air flow obstruction,decreased from 2020\par now normoxic on room air\par Needs cardiology follow up, discussed stopping Eliquis if sig bleeding\par concomitant mod AS, diastolic dysfunction\par refuses cpap, has dentures cant use appliance, use 02 2 liters nocturnal, will check overnight oximetry \par discussed with daughter will schedule follow up for 6 months, can be sooner pending status\par prognosis guarded\par Advised Covid vaccine, has not had yet\par

## 2021-04-29 NOTE — CONSULT LETTER
[Dear  ___] : Dear  [unfilled], [Consult Letter:] : I had the pleasure of evaluating your patient, [unfilled]. [Please see my note below.] : Please see my note below. [Sincerely,] : Sincerely, [DrDave  ___] : Dr. STONER [FreeTextEntry3] : Owen William DO [facility]\par Pulmonary Critical Care\par Director Pulmonary Division\par Medical Director Respiratory Therapy\par [facility]\par \par

## 2021-04-29 NOTE — HISTORY OF PRESENT ILLNESS
[TextBox_4] : recent dx CML, having problems with Gleevec\par  on apixaban 2.5 mg,Post  RLL, RML PE, \par  has IVC filter\par currently doing better, using 02, no acute pulmonary complaints\par has duo neb\par had some vaginal  bleeding recently\par no fever, chill, chest pain

## 2021-05-17 ENCOUNTER — RX CHANGE (OUTPATIENT)
Age: 86
End: 2021-05-17

## 2021-05-18 ENCOUNTER — RX CHANGE (OUTPATIENT)
Age: 86
End: 2021-05-18

## 2021-05-24 ENCOUNTER — RX CHANGE (OUTPATIENT)
Age: 86
End: 2021-05-24

## 2021-05-25 ENCOUNTER — RX CHANGE (OUTPATIENT)
Age: 86
End: 2021-05-25

## 2021-06-14 ENCOUNTER — APPOINTMENT (OUTPATIENT)
Dept: CT IMAGING | Facility: CLINIC | Age: 86
End: 2021-06-14
Payer: MEDICARE

## 2021-06-14 ENCOUNTER — OUTPATIENT (OUTPATIENT)
Dept: OUTPATIENT SERVICES | Facility: HOSPITAL | Age: 86
LOS: 1 days | End: 2021-06-14
Payer: MEDICARE

## 2021-06-14 DIAGNOSIS — Z98.89 OTHER SPECIFIED POSTPROCEDURAL STATES: Chronic | ICD-10-CM

## 2021-06-14 DIAGNOSIS — Z96.651 PRESENCE OF RIGHT ARTIFICIAL KNEE JOINT: Chronic | ICD-10-CM

## 2021-06-14 DIAGNOSIS — M25.559 PAIN IN UNSPECIFIED HIP: ICD-10-CM

## 2021-06-14 DIAGNOSIS — Z95.0 PRESENCE OF CARDIAC PACEMAKER: Chronic | ICD-10-CM

## 2021-06-14 DIAGNOSIS — M54.16 RADICULOPATHY, LUMBAR REGION: ICD-10-CM

## 2021-06-14 DIAGNOSIS — Z98.41 CATARACT EXTRACTION STATUS, RIGHT EYE: Chronic | ICD-10-CM

## 2021-06-14 DIAGNOSIS — Z98.890 OTHER SPECIFIED POSTPROCEDURAL STATES: Chronic | ICD-10-CM

## 2021-06-14 DIAGNOSIS — Z98.42 CATARACT EXTRACTION STATUS, LEFT EYE: Chronic | ICD-10-CM

## 2021-06-14 DIAGNOSIS — G56.00 CARPAL TUNNEL SYNDROME, UNSPECIFIED UPPER LIMB: Chronic | ICD-10-CM

## 2021-06-14 DIAGNOSIS — H02.409 UNSPECIFIED PTOSIS OF UNSPECIFIED EYELID: Chronic | ICD-10-CM

## 2021-06-14 DIAGNOSIS — K64.8 OTHER HEMORRHOIDS: Chronic | ICD-10-CM

## 2021-06-14 DIAGNOSIS — M75.121 COMPLETE ROTATOR CUFF TEAR OR RUPTURE OF RIGHT SHOULDER, NOT SPECIFIED AS TRAUMATIC: Chronic | ICD-10-CM

## 2021-06-14 DIAGNOSIS — Z90.49 ACQUIRED ABSENCE OF OTHER SPECIFIED PARTS OF DIGESTIVE TRACT: Chronic | ICD-10-CM

## 2021-06-14 DIAGNOSIS — H02.403 UNSPECIFIED PTOSIS OF BILATERAL EYELIDS: Chronic | ICD-10-CM

## 2021-06-14 DIAGNOSIS — Z90.710 ACQUIRED ABSENCE OF BOTH CERVIX AND UTERUS: Chronic | ICD-10-CM

## 2021-06-14 DIAGNOSIS — D12.6 BENIGN NEOPLASM OF COLON, UNSPECIFIED: Chronic | ICD-10-CM

## 2021-06-14 DIAGNOSIS — Z95.828 PRESENCE OF OTHER VASCULAR IMPLANTS AND GRAFTS: Chronic | ICD-10-CM

## 2021-06-14 PROCEDURE — 73700 CT LOWER EXTREMITY W/O DYE: CPT | Mod: 26,LT,MH

## 2021-06-14 PROCEDURE — 72131 CT LUMBAR SPINE W/O DYE: CPT | Mod: 26,MH

## 2021-06-14 PROCEDURE — 72131 CT LUMBAR SPINE W/O DYE: CPT

## 2021-06-14 PROCEDURE — 76376 3D RENDER W/INTRP POSTPROCES: CPT

## 2021-06-14 PROCEDURE — 76376 3D RENDER W/INTRP POSTPROCES: CPT | Mod: 26

## 2021-06-14 PROCEDURE — 73700 CT LOWER EXTREMITY W/O DYE: CPT

## 2021-06-18 NOTE — OCCUPATIONAL THERAPY INITIAL EVALUATION ADULT - FINE MOTOR COORDINATION, RIGHT HAND THUMB/FINGER OPPOSITION SKILLS, OT EVAL
What Type Of Note Output Would You Prefer (Optional)?: Standard Output
How Severe Is Your Skin Lesion?: moderate
Has Your Skin Lesion Been Treated?: not been treated
Is This A New Presentation, Or A Follow-Up?: Skin Lesions
normal performance

## 2021-07-30 ENCOUNTER — NON-APPOINTMENT (OUTPATIENT)
Age: 86
End: 2021-07-30

## 2021-08-04 ENCOUNTER — NON-APPOINTMENT (OUTPATIENT)
Age: 86
End: 2021-08-04

## 2021-10-26 ENCOUNTER — APPOINTMENT (OUTPATIENT)
Dept: PULMONOLOGY | Facility: CLINIC | Age: 86
End: 2021-10-26

## 2021-11-16 NOTE — OCCUPATIONAL THERAPY INITIAL EVALUATION ADULT - FINE MOTOR COORDINATION EXAM
[FreeTextEntry1] : The FBS and HbA1c indicates good diabetic control. The lipid panel was fine. The TSH and free T4 were normal. Left UE/Right UE

## 2022-04-02 NOTE — PROGRESS NOTE ADULT - SUBJECTIVE AND OBJECTIVE BOX
Pt denies suicidal or homicidal ideations, pt denies hallucinations. Pt expresses thought blocking, flat affect. Appears internally stimulated / occupied. Negative behavioral concerns at this time. Will follow and monitor. Patient: YOSELYN PAULSON [id number] 85y Female                           Internal Medicine Hospitalist Progress Note    Initial HPI:  84 y/o female with PMH of asthma, COPD ( on home O2 2L),  afib s/p ablation on coumadin, came to the ED complaining of cough and shortness of breath. Patient said the cough has been going on for almost 1 month now, productive with white-yellowish sputum. She said she saw her pulmonologist recently and was treated with antibiotics but her symptoms persisted. Patient said her difficulty breathing worsen yesterday, any minimal exertion resulted in shortness of breath. She usually use her O2 at night but yesterday she used it all day and used her nebulizer with no relief. She said she was also recently started on Prednisone 10mg. She noted chest tightness with the cough. She has no fever, chills, palpittion, nausea/vomiting, abdominal pain, change in bowel/urinary habit, recent travel, calf pain. (21 Jan 2019 23:50)    Interval History:  Reports feeling better today.  Cough improving.  Denies fever/ chills.  At baseline on O2 at home.  No additional complaints.    ____________________PHYSICAL EXAM:  Vitals reviewed as indicated below  GENERAL:  NAD Alert and Oriented x 3   HEENT: NCAT  CARDIOVASCULAR:  S1, S2  LUNGS: coarse BS b/l no wheezing  ABDOMEN:  soft, (-) tenderness, (-) distension, (+) bowel sounds, (-) guarding, (-) rebound (-) rigidity  EXTREMITIES:  no cyanosis / clubbing / edema.   ____________________    VITALS:  Vital Signs Last 24 Hrs  T(C): 36.9 (23 Jan 2019 07:35), Max: 36.9 (23 Jan 2019 07:35)  T(F): 98.4 (23 Jan 2019 07:35), Max: 98.4 (23 Jan 2019 07:35)  HR: 62 (23 Jan 2019 07:42) (62 - 76)  BP: 160/78 (23 Jan 2019 07:35) (116/71 - 160/78)  BP(mean): --  RR: 18 (23 Jan 2019 07:35) (16 - 20)  SpO2: 98% (23 Jan 2019 07:42) (94% - 98%) Daily     Daily   CAPILLARY BLOOD GLUCOSE        I&O's Summary      LABS:                        11.4   1.9   )-----------( 166      ( 22 Jan 2019 07:34 )             35.7     01-22    139  |  103  |  20.0  ----------------------------<  149<H>  4.9   |  26.0  |  0.73    Ca    9.2      22 Jan 2019 07:34    TPro  7.4  /  Alb  4.2  /  TBili  0.7  /  DBili  x   /  AST  19  /  ALT  11  /  AlkPhos  46  01-21    PT/INR - ( 23 Jan 2019 06:51 )   PT: 38.9 sec;   INR: 3.26 ratio         PTT - ( 23 Jan 2019 06:51 )  PTT:38.4 sec  LIVER FUNCTIONS - ( 21 Jan 2019 19:29 )  Alb: 4.2 g/dL / Pro: 7.4 g/dL / ALK PHOS: 46 U/L / ALT: 11 U/L / AST: 19 U/L / GGT: x             CARDIAC MARKERS ( 22 Jan 2019 00:14 )  x     / <0.01 ng/mL / 52 U/L / x     / x      CARDIAC MARKERS ( 21 Jan 2019 19:29 )  x     / <0.01 ng/mL / 54 U/L / x     / x            MEDICATIONS:  acetaminophen   Tablet .. 650 milliGRAM(s) Oral every 6 hours PRN  ALBUTerol/ipratropium for Nebulization 3 milliLiter(s) Nebulizer every 6 hours  amLODIPine   Tablet 5 milliGRAM(s) Oral daily  ATENolol  Tablet 25 milliGRAM(s) Oral daily  atorvastatin 20 milliGRAM(s) Oral at bedtime  azithromycin   Tablet 500 milliGRAM(s) Oral daily  benzonatate 100 milliGRAM(s) Oral three times a day  ferrous    sulfate 325 milliGRAM(s) Oral daily  FLUoxetine 20 milliGRAM(s) Oral daily  furosemide    Tablet 40 milliGRAM(s) Oral daily  guaiFENesin/dextromethorphan  Syrup 5 milliLiter(s) Oral every 6 hours PRN  methylPREDNISolone sodium succinate Injectable 40 milliGRAM(s) IV Push every 12 hours  oxybutynin 5 milliGRAM(s) Oral daily  pantoprazole    Tablet 40 milliGRAM(s) Oral before breakfast  saccharomyces boulardii 250 milliGRAM(s) Oral two times a day

## 2022-08-04 NOTE — GOALS OF CARE CONVERSATION - ADVANCED CARE PLANNING - NS PRO AD PATIENT TYPE
[address] [facility] for hospitalist consult sent to Dr. Benito Valentin  Hospitalist consult completed by PRINCESS Young  08/04/22 [id number] Flo Staley  08/04/22 0249 Living Will/Health Care Proxy (HCP)

## 2022-08-08 NOTE — DISCHARGE NOTE ADULT - ABILITY TO HEAR (WITH HEARING AID OR HEARING APPLIANCE IF NORMALLY USED):
Adequate: hears normal conversation without difficulty Consent 3/Introductory Paragraph: I gave the patient a chance to ask questions they had about the procedure.  Following this I explained the Mohs procedure and consent was obtained. The risks, benefits and alternatives to therapy were discussed in detail. Specifically, the risks of infection, scarring, bleeding, prolonged wound healing, incomplete removal, allergy to anesthesia, nerve injury and recurrence were addressed. Prior to the procedure, the treatment site was clearly identified and confirmed by the patient. All components of Universal Protocol/PAUSE Rule completed.

## 2022-08-23 NOTE — PHYSICAL THERAPY INITIAL EVALUATION ADULT - DISCHARGE DISPOSITION, PT EVAL
Goal Outcome Evaluation:            Pt up with lift. Nonverbal. Blind. Incont, has external catheter on with good output. Had EGD, see results. Pt pulled NGT prior to EGD, didn't ayan to be replaced. Placed on thickened full liquids, tolerating, total feed. Mag 1.5, replaced by IV & started on po supplements. IVF infusing. Sitter @ bedside.               home with assist, RW and home PT pending progress and pt is an increased falls risk and is not deemed safe to return home without assist/supervision at this time./home w/ home PT/home w/ assist

## 2022-08-25 NOTE — ED PROVIDER NOTE - CONSTITUTIONAL MOOD
appropriate Purse String (Simple) Text: Given the location of the defect and the characteristics of the surrounding skin a purse string closure was deemed most appropriate.  Undermining was performed circumfirentially around the surgical defect.  A purse string suture was then placed and tightened.

## 2022-09-23 NOTE — PATIENT PROFILE ADULT - SURGICAL SITE DESCRIPTION
Michell Mcdonnell is a 80 year old female here for  No chief complaint on file.    Denies latex allergy or sensitivity.    Medication verified and med list updated.  PCP and Pharmacy verified.    Social History     Tobacco Use   Smoking Status Former Smoker   • Packs/day: 0.25   • Years: 50.00   • Pack years: 12.50   • Types: Cigarettes   • Quit date: 2021   • Years since quittin.3   Smokeless Tobacco Never Used   Tobacco Comment    2-3 cigarettes per day. Pt restarted in May 2020     Advance Directives Filed: Yes    ECOG:   ECOG [22 0928]   ECOG Performance Status 0       Vitals:    Visit Vitals  Ht 5' 1\" (1.549 m)   BMI 21.60 kg/m²       These vital signs are:  Within defined parameters (Per Reference \"Defined Limits [facility]patient Department ([facility])\")    Height: Yes, shoes on.  Ht Readings from Last 1 Encounters:   22 5' 1\" (1.549 m)     Weight:Yes, shoes on.  Wt Readings from Last 3 Encounters:   22 51.9 kg (114 lb 4.9 oz)   22 50.7 kg (111 lb 12.4 oz)   22 53 kg (116 lb 11.7 oz)       BMI: Body mass index is 21.6 kg/m².    REVIEW OF SYSTEMS  GENERAL:  Patient denies headache, fevers, chills, night sweats, excessive fatigue, change in appetite, weight loss, dizziness  ALLERGIC/IMMUNOLOGIC: Verified allergies: Yes  EYES:  Patient denies significant visual difficulties, double vision, blurred vision  ENT/MOUTH: Patient denies problems with hearing, sore throat, sinus drainage, mouth sores  ENDOCRINE:  Patient denies diabetes, thyroid disease, hormone replacement, hot flashes  HEMATOLOGIC/LYMPHATIC: Patient denies easy bruising, bleeding, tender lymph nodes, swollen lymph nodes  BREASTS: Patient denies abnormal masses of breast, nipple discharge, pain  RESPIRATORY:  Patient denies lung pain with breathing, cough, coughing up blood, shortness of breath  CARDIOVASCULAR:  Patient denies anginal chest pain, palpitations, shortness of breath when lying flat, peripheral  edema  GASTROINTESTINAL: Patient denies abdominal pain , nausea, vomiting, GI bleeding, constipation, change in bowel habits, heartburn, sensation of feeling full, difficulty swallowing, but complains of: diarrhea  : Patient denies abnormal genital masses, blood in the urine, frequency, urgency, burning with urination, hesitancy, incontinence, vaginal bleeding, discharge  MUSCULOSKELETAL:  Patient denies joint pain, bone pain, joint swelling, redness, decreased range of motion  SKIN:  Patient denies chronic rashes, inflammation, ulcerations, skin changes, itching  NEUROLOGIC:  Patient denies loss of balance, areas of focal weakness, abnormal gait, sensory problems, numbness, tingling  PSYCHIATRIC: Patient denies insomnia, depression, anxiety    This patient reported abnormal symptoms that needed immediate verbal communication: No     Lt knee ace wrap CDI

## 2023-09-06 NOTE — PHYSICAL THERAPY INITIAL EVALUATION ADULT - CAPILLARY REFILL, LUE
Plastic Surgery Discharge Instructions  Martinez Kruger DO    Wound Care  1. Shower daily beginning 48hrs after surgery  2. Okay to let warm soapy water run over the wound at that time  3. Do not submerge wounds in the bath  4. Leave any skin glue or mesh tape in place    Drain Care  If you have drains, strip tubing and record output when drain bulb becomes half full, or at least twice daily  Record output for each drain and bring to our follow up appointment    Diet  Regular Diet    Activity  Light activity only - no lifting greater than 10 lbs  Avoid strenuous activity that would cause you to get hot or sweaty    Medications  You have been given a prescription for narcotic pain medication and anti-inflammatory pain  Unless otherwise contraindicated by your doctor, take 2 extra strength Tylenol and 600mg of ibuprofen every 8 hours  Please take medications as prescribed     What to call for:  1. Sustained fever > 101.0  2. Changes in color, sensation, temperature or pain at surgical site  3. Redness and/or drainage from the surgical site  4. Any reaction to prescribed medications  5. Continuous bloody drainage from surgical drains  6. Wound vac malfunction  7. Questions related to the procedure    Follow-up  1. Please call [phone] to schedule or confirm your follow up visit at the [facility], [address]  2. Call with any questions or concerns.  2. After hours call [phone] - ask to speak with the Plastic Surgery fellow on call    
less than/equal to 3 secs

## 2024-03-11 NOTE — HISTORY REVIEWED
========================  Cristiano reviewed 3/11/2024 . Follow up appt is scheduled on 5/15/2024 .    Last office visit with me : 2/15/2024      [History reviewed] : History reviewed. [Medications and Allergies reviewed] : Medications and allergies reviewed.

## 2024-07-23 NOTE — H&P ADULT - GLASGOW COMA SCALE: SCORE, MLM
15 Responsibility to children, family, or others/Identifies reasons for living/Supportive social network of family or friends

## 2025-04-11 NOTE — H&P PST ADULT - PRIMARY CARE PROVIDER
The day before surgery you will receive a phone call from the surgery nurse to let you know what time to arrive on the day of surgery. This call will usually be between 2-4 PM. If you do not receive a phone call by 4 PM the day before your surgery please call [phone] and let them know you have not received an arrival time. If your surgery is on Monday, your call will be on the Friday before your Monday surgery. Please check your voicemail as they may leave a message with that information.  If you are running late or wake up sick the day of surgery please call the above number for further instructions.    The morning of surgery take Metoprolol    Chlorhexidine Gluconate 4% Solution    Patient should shower with this soap a minimum of 3 consecutive showers (2 nights before surgery, the night before surgery and the morning of surgery) washing from the neck down (avoiding contact with genitalia).      DO NOT WASH YOUR HAIR OR FACE WITH THIS SOAP.  When washing with this soap, apply enough to suds up the body thoroughly, turn the water away from your body and allow the soap suds to remain on the body for 2 full minutes, then rinse body completely.      After using this soap on the body, please do not apply powders or lotions to your body.  After the shower the night before surgery, please dry off with a new towel, sleep in new freshly laundered pj's, and change your bed linen before going to sleep.      IF YOU HAVE A PET IN YOUR HOME, please do not allow your pet to sleep in the bed with you after you have showered with your surgery prep soap.     Please remember that it is not recommended to allow your pet to sleep with you post op, until your incision has healed.  This can increase your risk of post op infection.    PREOPERATIVE GUIDELINES WHEN RECEIVING ANESTHESIA    Do not eat anything after midnight the night before your surgery. You may have water up to 2 hours before your arrival time. No gum or candy the 
Dr. Martinez [phone]

## 2025-07-29 NOTE — DISCHARGE NOTE ADULT - CARE PROVIDER_API CALL
Quentin Ingram (MD), Orthopaedic Surgery  [address]  [place] [address]  Phone: [phone]  Fax: [phone]
 used